# Patient Record
Sex: MALE | Race: WHITE | Employment: FULL TIME | URBAN - METROPOLITAN AREA
[De-identification: names, ages, dates, MRNs, and addresses within clinical notes are randomized per-mention and may not be internally consistent; named-entity substitution may affect disease eponyms.]

---

## 2018-06-22 PROBLEM — C80.1 CANCER (HCC): Status: ACTIVE | Noted: 2018-06-01

## 2018-06-22 PROBLEM — Z51.11 ENCOUNTER FOR ANTINEOPLASTIC CHEMOTHERAPY: Status: ACTIVE | Noted: 2018-06-22

## 2018-06-22 PROBLEM — C84.49 PERIPHERAL T CELL LYMPHOMA OF EXTRANODAL AND SOLID ORGAN SITES (HCC): Status: ACTIVE | Noted: 2018-06-22

## 2018-07-02 PROBLEM — R50.81 NEUTROPENIC FEVER (HCC): Status: ACTIVE | Noted: 2018-07-02

## 2018-07-02 PROBLEM — D70.9 NEUTROPENIC FEVER (HCC): Status: ACTIVE | Noted: 2018-07-02

## 2018-07-22 PROBLEM — Z51.11 ENCOUNTER FOR ANTINEOPLASTIC CHEMOTHERAPY: Status: RESOLVED | Noted: 2018-06-22 | Resolved: 2018-07-22

## 2018-07-24 ENCOUNTER — HOSPITAL ENCOUNTER (INPATIENT)
Age: 58
LOS: 4 days | Discharge: HOME | DRG: 690 | End: 2018-07-28
Attending: INTERNAL MEDICINE | Admitting: INTERNAL MEDICINE
Payer: COMMERCIAL

## 2018-07-24 ENCOUNTER — APPOINTMENT (OUTPATIENT)
Dept: GENERAL RADIOLOGY | Age: 58
DRG: 690 | End: 2018-07-24
Attending: INTERNAL MEDICINE
Payer: COMMERCIAL

## 2018-07-24 LAB
ABO/RH: NORMAL
ABO/RH: NORMAL
ALBUMIN SERPL-MCNC: 3.2 G/DL (ref 3.4–5)
ALP BLD-CCNC: 40 U/L (ref 40–129)
ALT SERPL-CCNC: 24 U/L (ref 10–40)
ANION GAP SERPL CALCULATED.3IONS-SCNC: 13 MMOL/L (ref 3–16)
ANTIBODY SCREEN: NORMAL
APTT: 24.1 SEC (ref 26–36)
AST SERPL-CCNC: 24 U/L (ref 15–37)
BACTERIA: ABNORMAL /HPF
BILIRUB SERPL-MCNC: 0.4 MG/DL (ref 0–1)
BILIRUBIN DIRECT: <0.2 MG/DL (ref 0–0.3)
BILIRUBIN URINE: NEGATIVE
BILIRUBIN, INDIRECT: ABNORMAL MG/DL (ref 0–1)
BLOOD, URINE: ABNORMAL
BUN BLDV-MCNC: 15 MG/DL (ref 7–20)
CALCIUM SERPL-MCNC: 8.6 MG/DL (ref 8.3–10.6)
CHLORIDE BLD-SCNC: 101 MMOL/L (ref 99–110)
CLARITY: CLEAR
CO2: 25 MMOL/L (ref 21–32)
COLOR: YELLOW
CREAT SERPL-MCNC: 0.6 MG/DL (ref 0.9–1.3)
GFR AFRICAN AMERICAN: >60
GFR NON-AFRICAN AMERICAN: >60
GLUCOSE BLD-MCNC: 126 MG/DL (ref 70–99)
GLUCOSE URINE: NEGATIVE MG/DL
HCT VFR BLD CALC: 12.8 % (ref 40.5–52.5)
HEMOGLOBIN: 4.6 G/DL (ref 13.5–17.5)
INR BLD: 1.14 (ref 0.86–1.14)
KETONES, URINE: ABNORMAL MG/DL
LACTATE DEHYDROGENASE: 127 U/L (ref 100–190)
LEUKOCYTE ESTERASE, URINE: ABNORMAL
MCH RBC QN AUTO: 27 PG (ref 26–34)
MCHC RBC AUTO-ENTMCNC: 35.7 G/DL (ref 31–36)
MCV RBC AUTO: 75.5 FL (ref 80–100)
MICROSCOPIC EXAMINATION: YES
NITRITE, URINE: POSITIVE
PDW BLD-RTO: 19.9 % (ref 12.4–15.4)
PH UA: 7
PHOSPHORUS: 2.6 MG/DL (ref 2.5–4.9)
PLATELET # BLD: 41 K/UL (ref 135–450)
PMV BLD AUTO: 7.3 FL (ref 5–10.5)
POTASSIUM SERPL-SCNC: 3.7 MMOL/L (ref 3.5–5.1)
PROTEIN UA: 100 MG/DL
PROTHROMBIN TIME: 13 SEC (ref 9.8–13)
RBC # BLD: 1.69 M/UL (ref 4.2–5.9)
RBC UA: ABNORMAL /HPF (ref 0–2)
SODIUM BLD-SCNC: 139 MMOL/L (ref 136–145)
SPECIFIC GRAVITY UA: 1.01
TOTAL PROTEIN: 5.7 G/DL (ref 6.4–8.2)
URIC ACID, SERUM: 2.5 MG/DL (ref 3.5–7.2)
URINE TYPE: ABNORMAL
UROBILINOGEN, URINE: 4 E.U./DL
WBC # BLD: 0.3 K/UL (ref 4–11)
WBC UA: >100 /HPF (ref 0–5)

## 2018-07-24 PROCEDURE — P9040 RBC LEUKOREDUCED IRRADIATED: HCPCS

## 2018-07-24 PROCEDURE — 86901 BLOOD TYPING SEROLOGIC RH(D): CPT

## 2018-07-24 PROCEDURE — 71046 X-RAY EXAM CHEST 2 VIEWS: CPT

## 2018-07-24 PROCEDURE — 87040 BLOOD CULTURE FOR BACTERIA: CPT

## 2018-07-24 PROCEDURE — 85730 THROMBOPLASTIN TIME PARTIAL: CPT

## 2018-07-24 PROCEDURE — 83615 LACTATE (LD) (LDH) ENZYME: CPT

## 2018-07-24 PROCEDURE — 84550 ASSAY OF BLOOD/URIC ACID: CPT

## 2018-07-24 PROCEDURE — 6370000000 HC RX 637 (ALT 250 FOR IP): Performed by: INTERNAL MEDICINE

## 2018-07-24 PROCEDURE — 6360000002 HC RX W HCPCS: Performed by: INTERNAL MEDICINE

## 2018-07-24 PROCEDURE — 87186 SC STD MICRODIL/AGAR DIL: CPT

## 2018-07-24 PROCEDURE — 85025 COMPLETE CBC W/AUTO DIFF WBC: CPT

## 2018-07-24 PROCEDURE — 2580000003 HC RX 258: Performed by: INTERNAL MEDICINE

## 2018-07-24 PROCEDURE — 36430 TRANSFUSION BLD/BLD COMPNT: CPT

## 2018-07-24 PROCEDURE — 86923 COMPATIBILITY TEST ELECTRIC: CPT

## 2018-07-24 PROCEDURE — 84100 ASSAY OF PHOSPHORUS: CPT

## 2018-07-24 PROCEDURE — 87077 CULTURE AEROBIC IDENTIFY: CPT

## 2018-07-24 PROCEDURE — 80048 BASIC METABOLIC PNL TOTAL CA: CPT

## 2018-07-24 PROCEDURE — 36591 DRAW BLOOD OFF VENOUS DEVICE: CPT

## 2018-07-24 PROCEDURE — 80076 HEPATIC FUNCTION PANEL: CPT

## 2018-07-24 PROCEDURE — 87081 CULTURE SCREEN ONLY: CPT

## 2018-07-24 PROCEDURE — 86900 BLOOD TYPING SEROLOGIC ABO: CPT

## 2018-07-24 PROCEDURE — 94760 N-INVAS EAR/PLS OXIMETRY 1: CPT

## 2018-07-24 PROCEDURE — 81001 URINALYSIS AUTO W/SCOPE: CPT

## 2018-07-24 PROCEDURE — 87086 URINE CULTURE/COLONY COUNT: CPT

## 2018-07-24 PROCEDURE — 94664 DEMO&/EVAL PT USE INHALER: CPT

## 2018-07-24 PROCEDURE — 2060000000 HC ICU INTERMEDIATE R&B

## 2018-07-24 PROCEDURE — 86850 RBC ANTIBODY SCREEN: CPT

## 2018-07-24 PROCEDURE — 85610 PROTHROMBIN TIME: CPT

## 2018-07-24 PROCEDURE — 94640 AIRWAY INHALATION TREATMENT: CPT

## 2018-07-24 RX ORDER — ONDANSETRON 2 MG/ML
8 INJECTION INTRAMUSCULAR; INTRAVENOUS EVERY 8 HOURS PRN
Status: DISCONTINUED | OUTPATIENT
Start: 2018-07-24 | End: 2018-07-28 | Stop reason: HOSPADM

## 2018-07-24 RX ORDER — 0.9 % SODIUM CHLORIDE 0.9 %
1000 INTRAVENOUS SOLUTION INTRAVENOUS ONCE
Status: COMPLETED | OUTPATIENT
Start: 2018-07-24 | End: 2018-07-25

## 2018-07-24 RX ORDER — OXYCODONE HYDROCHLORIDE AND ACETAMINOPHEN 5; 325 MG/1; MG/1
2 TABLET ORAL EVERY 4 HOURS PRN
Status: DISCONTINUED | OUTPATIENT
Start: 2018-07-24 | End: 2018-07-28 | Stop reason: HOSPADM

## 2018-07-24 RX ORDER — MAGNESIUM SULFATE IN WATER 40 MG/ML
4 INJECTION, SOLUTION INTRAVENOUS PRN
Status: DISCONTINUED | OUTPATIENT
Start: 2018-07-24 | End: 2018-07-28 | Stop reason: HOSPADM

## 2018-07-24 RX ORDER — VALACYCLOVIR HYDROCHLORIDE 500 MG/1
500 TABLET, FILM COATED ORAL 2 TIMES DAILY
Status: DISCONTINUED | OUTPATIENT
Start: 2018-07-24 | End: 2018-07-27

## 2018-07-24 RX ORDER — SODIUM CHLORIDE 9 MG/ML
500 INJECTION, SOLUTION INTRAVENOUS CONTINUOUS
Status: DISCONTINUED | OUTPATIENT
Start: 2018-07-24 | End: 2018-07-25

## 2018-07-24 RX ORDER — FLUCONAZOLE 200 MG/1
400 TABLET ORAL DAILY
Status: DISCONTINUED | OUTPATIENT
Start: 2018-07-24 | End: 2018-07-26

## 2018-07-24 RX ORDER — SODIUM CHLORIDE 0.9 % (FLUSH) 0.9 %
10 SYRINGE (ML) INJECTION PRN
Status: DISCONTINUED | OUTPATIENT
Start: 2018-07-24 | End: 2018-07-28 | Stop reason: HOSPADM

## 2018-07-24 RX ORDER — SODIUM CHLORIDE 0.9 % (FLUSH) 0.9 %
10 SYRINGE (ML) INJECTION EVERY 12 HOURS SCHEDULED
Status: DISCONTINUED | OUTPATIENT
Start: 2018-07-24 | End: 2018-07-28 | Stop reason: HOSPADM

## 2018-07-24 RX ORDER — OXYCODONE HYDROCHLORIDE AND ACETAMINOPHEN 5; 325 MG/1; MG/1
1 TABLET ORAL EVERY 4 HOURS PRN
Status: DISCONTINUED | OUTPATIENT
Start: 2018-07-24 | End: 2018-07-28 | Stop reason: HOSPADM

## 2018-07-24 RX ORDER — ACETAMINOPHEN 325 MG/1
650 TABLET ORAL EVERY 4 HOURS PRN
Status: DISCONTINUED | OUTPATIENT
Start: 2018-07-24 | End: 2018-07-25 | Stop reason: SDUPTHER

## 2018-07-24 RX ORDER — POTASSIUM CHLORIDE 29.8 MG/ML
80 INJECTION INTRAVENOUS PRN
Status: DISCONTINUED | OUTPATIENT
Start: 2018-07-24 | End: 2018-07-28 | Stop reason: HOSPADM

## 2018-07-24 RX ORDER — 0.9 % SODIUM CHLORIDE 0.9 %
250 INTRAVENOUS SOLUTION INTRAVENOUS ONCE
Status: COMPLETED | OUTPATIENT
Start: 2018-07-24 | End: 2018-07-25

## 2018-07-24 RX ADMIN — SODIUM CHLORIDE 1000 ML: 9 INJECTION, SOLUTION INTRAVENOUS at 21:52

## 2018-07-24 RX ADMIN — FLUCONAZOLE 400 MG: 200 TABLET ORAL at 21:31

## 2018-07-24 RX ADMIN — SODIUM CHLORIDE 500 ML: 9 INJECTION, SOLUTION INTRAVENOUS at 21:53

## 2018-07-24 RX ADMIN — VALACYCLOVIR HYDROCHLORIDE 500 MG: 500 TABLET, FILM COATED ORAL at 21:31

## 2018-07-24 RX ADMIN — PIPERACILLIN SODIUM,TAZOBACTAM SODIUM 4.5 G: 4; .5 INJECTION, POWDER, FOR SOLUTION INTRAVENOUS at 21:53

## 2018-07-24 RX ADMIN — SODIUM CHLORIDE 250 ML: 9 INJECTION, SOLUTION INTRAVENOUS at 23:15

## 2018-07-24 ASSESSMENT — PAIN SCALES - GENERAL
PAINLEVEL_OUTOF10: 0
PAINLEVEL_OUTOF10: 0

## 2018-07-24 NOTE — H&P
[facility] History and Physical       Attending Physician: Mallorie Mayorga DO        Referring MD: Mallorie Terry.DO  Stirling Franck  [address]  [place], [address]    Name: Sarah Mcneill :  1960  MRN:  [id number]    Admission: 2018      Date: 2018    Reason for Admission: Cycle #2 CHOEP for PTCL, Stage IVb    History of Present Illness: This is a 61year old male, [place]ian, hematology professor at [employer] w/ a PMH of depression / anxiety, GERD and hypertension. He initially presented to urgent care in 2018 with fever, chills, nausea and diminished appetite. He was given a prescription for Azithromycin, but did not have improvement in his symptoms, so went back to urgent care and was given a prescription for Cipro. He was also informed to follow up with his PCP. At that time he was found to have a transaminitis w/ ,  and normal Alk Phos. He was then admitted (18) for additional work up. Additional work - up included CT scans (5/3/18) that showed extensive enlarged mesenteric lymph nodes and mildly prominent periaortic lymph nodes, scattered pleural parenchymal thickening and splenic hematoma. He then underwent PET / CT scan (18) that showed multiple areas of adenopathy with mild uptake in the neck, axilla and abdomen. He had a large splenic hematoma, mild diffuse bone marrow activity and nonspecific multiple small lung nodules. He then underwent right axillary lymph node biopsy (18) that revealed a diagnosis of peripheral T-cell lymphoma, NOS. He underwent BM bx/asp (18) that showed involvement with PTCL. Cytogenetics on the BM bx were reported as \" no growth\" and FISH panel was unremarkable.       Day #9, CHOEP - seen in [place], looks terrible; febrile at 100.8  Lost 10 lbs, not eating very well  Hgb 5.4 g/dL on outside labs        Past Surgical History:   Procedure Laterality Date    throat. · Cardiovascular: No chest pain, dyspnea on exertion, palpitations or loss of consciousness. + BLE swelling  · Respiratory: + non productive cough, or wheezing, no sputum production. No hemoptysis. · Gastrointestinal: No abdominal pain, +appetite loss, No blood in stools. · Genitourinary: No dysuria, trouble voiding, or hematuria. · Musculoskeletal:  + generalized weakness. No joint complaints. · Integumentary: No rash or pruritis. · Neurological: No headache, diplopia. No change in gait, balance, or coordination. No paresthesias. · Endocrine: No temperature intolerance. No excessive thirst, fluid intake, or urination. · Hematologic/Lymphatic: No abnormal bruising or ecchymoses, blood clots or swollen lymph nodes. · Allergic/Immunologic: No nasal congestion or hives. Physical Exam:     Vital Signs:  /71   Pulse 103   Temp 98.5 °F (36.9 °C) (Oral)   Resp 18   SpO2 100%     Weight:    Wt Readings from Last 3 Encounters:   07/06/18 200 lb (90.7 kg)   06/25/18 214 lb 8.1 oz (97.3 kg)         General: Awake, alert and oriented. HEENT: normocephalic, PERRL, no scleral erythema or icterus, Oral mucosa moist and intact, throat clear  NECK: supple without palpable adenopathy  BACK: Straight negative CVAT  SKIN: warm dry and intact without lesions rashes or masses  CHEST: rhonchi bilaterally in the bases, otherwise clear without use of accessory muscles  CV: Normal S1 S2, RRR, no MRG  ABD: NT ND normoactive BS, + palpable splenomegaly 3 finger breaths below costal margin  EXTREMITIES: 1+ bilateral pedal edema, denies calf tenderness  NEURO: CN II - XII grossly intact  CATHETER: Right PAC (IR , 6/21/18) - No erythema or tenderness    Laboratory Data: pending  CBC: No results for input(s): WBC, HGB, HCT, MCV, PLT in the last 72 hours. BMP/Mag:No results for input(s): NA, K, CL, CO2, PHOS, BUN, CREATININE, MG in the last 72 hours.     Invalid input(s): CA  LIVP: No results for input(s): AST, ALT, LIPASE, BILIDIR, BILITOT, ALKPHOS in the last 72 hours. Invalid input(s): AMYLASE,  ALB  Coags: No results for input(s): PROTIME, INR, APTT in the last 72 hours. Uric Acid No results for input(s): LABURIC in the last 72 hours. Diagnostic Imagin. PET / CT scan (18): Pathology:  1. Right Axilla Lymph Node Biopsy (18):    2.  BM bx/asp (18): IHC:      Cytogenetic:  No growth in culture   FISH:  WNL    PROBLEM LIST:             1. PTCL, Stabe IVb w/ BM involvement (Dx 18)  2. Depression / Anxiety  3. GERD  4. HTN      TREATMENT:            1. CHOEP   Cycle #1 - 18      ASSESSMENT AND PLAN:           1. PTCL, Stage IVb w/ BM involvement:    - Plan:  CHOEP x 4 cycles, Restage and if in CR, then BEAM & ASCT  - Admit for cycle #1 CHOEP    Cycle #2, Day +9: Will need dose reduction next cycle    2. ID: Patient is currently afebrile with cough, but no SOB  - CXR, zosyn and prophylaxis  - panculture    3. Heme: Anemia and thrombocytopenia from disease  - Transfuse for Hgb < 7 and Platelets < 10 K. - Two units of PRBC's transfusion today     4. Metabolic:  Electrolytes are WNL and renal fxn stable. - Start IVF hydration   - Replace Potassium and Magnesium per protocol. 5.  Tumor Lysis Syndrome:  No evidence   - Begin Allopurinol 300 mg daily and IVF hydration    6. Nutrition:  Weight loss and decreased appetite from malignancy   - Start low microbial diet  - Dietary to follow closely     7. Depression / Anxiety  - Off all medications   - Psych to follow as needed    8. GERD:  Stable  - Start Pepcid with h/o GERD and starting steroids       - DVT Prophylaxis: Platelets <90,815 cells/dL - prophylactic lovenox on hold and mechanical prophylaxis with bilateral SCDs while in bed in place. Contraindications to pharmacologic prophylaxis: Thrombocytopenia  Contraindications to mechanical prophylaxis: None    - Disposition: Unknown      Wagner Goodrich , DO, MS

## 2018-07-25 LAB
ALBUMIN SERPL-MCNC: 3.2 G/DL (ref 3.4–5)
ALP BLD-CCNC: 45 U/L (ref 40–129)
ALT SERPL-CCNC: 30 U/L (ref 10–40)
ANION GAP SERPL CALCULATED.3IONS-SCNC: 10 MMOL/L (ref 3–16)
AST SERPL-CCNC: 29 U/L (ref 15–37)
BILIRUB SERPL-MCNC: 0.8 MG/DL (ref 0–1)
BILIRUBIN DIRECT: <0.2 MG/DL (ref 0–0.3)
BILIRUBIN, INDIRECT: ABNORMAL MG/DL (ref 0–1)
BLOOD BANK DISPENSE STATUS: NORMAL
BLOOD BANK PRODUCT CODE: NORMAL
BPU ID: NORMAL
BUN BLDV-MCNC: 11 MG/DL (ref 7–20)
CALCIUM SERPL-MCNC: 8.7 MG/DL (ref 8.3–10.6)
CHLORIDE BLD-SCNC: 106 MMOL/L (ref 99–110)
CO2: 23 MMOL/L (ref 21–32)
CREAT SERPL-MCNC: <0.5 MG/DL (ref 0.9–1.3)
DESCRIPTION BLOOD BANK: NORMAL
GFR AFRICAN AMERICAN: >60
GFR NON-AFRICAN AMERICAN: >60
GLUCOSE BLD-MCNC: 93 MG/DL (ref 70–99)
HCT VFR BLD CALC: 17.6 % (ref 40.5–52.5)
HEMOGLOBIN: 6.2 G/DL (ref 13.5–17.5)
LACTATE DEHYDROGENASE: 131 U/L (ref 100–190)
MCH RBC QN AUTO: 27.9 PG (ref 26–34)
MCHC RBC AUTO-ENTMCNC: 35.1 G/DL (ref 31–36)
MCV RBC AUTO: 79.6 FL (ref 80–100)
PDW BLD-RTO: 19.7 % (ref 12.4–15.4)
PHOSPHORUS: 3.3 MG/DL (ref 2.5–4.9)
PLATELET # BLD: 33 K/UL (ref 135–450)
PMV BLD AUTO: 7.2 FL (ref 5–10.5)
POTASSIUM SERPL-SCNC: 4 MMOL/L (ref 3.5–5.1)
RBC # BLD: 2.21 M/UL (ref 4.2–5.9)
SODIUM BLD-SCNC: 139 MMOL/L (ref 136–145)
TOTAL PROTEIN: 5.6 G/DL (ref 6.4–8.2)
URIC ACID, SERUM: 1.7 MG/DL (ref 3.5–7.2)
WBC # BLD: 0.4 K/UL (ref 4–11)

## 2018-07-25 PROCEDURE — 6370000000 HC RX 637 (ALT 250 FOR IP): Performed by: NURSE PRACTITIONER

## 2018-07-25 PROCEDURE — 80076 HEPATIC FUNCTION PANEL: CPT

## 2018-07-25 PROCEDURE — 87103 BLOOD FUNGUS CULTURE: CPT

## 2018-07-25 PROCEDURE — 2580000003 HC RX 258: Performed by: NURSE PRACTITIONER

## 2018-07-25 PROCEDURE — 6370000000 HC RX 637 (ALT 250 FOR IP): Performed by: INTERNAL MEDICINE

## 2018-07-25 PROCEDURE — 87070 CULTURE OTHR SPECIMN AEROBIC: CPT

## 2018-07-25 PROCEDURE — 84100 ASSAY OF PHOSPHORUS: CPT

## 2018-07-25 PROCEDURE — 84550 ASSAY OF BLOOD/URIC ACID: CPT

## 2018-07-25 PROCEDURE — 87205 SMEAR GRAM STAIN: CPT

## 2018-07-25 PROCEDURE — 36430 TRANSFUSION BLD/BLD COMPNT: CPT

## 2018-07-25 PROCEDURE — P9040 RBC LEUKOREDUCED IRRADIATED: HCPCS

## 2018-07-25 PROCEDURE — 36591 DRAW BLOOD OFF VENOUS DEVICE: CPT

## 2018-07-25 PROCEDURE — 86923 COMPATIBILITY TEST ELECTRIC: CPT

## 2018-07-25 PROCEDURE — 87102 FUNGUS ISOLATION CULTURE: CPT

## 2018-07-25 PROCEDURE — 87252 VIRUS INOCULATION TISSUE: CPT

## 2018-07-25 PROCEDURE — 87253 VIRUS INOCULATE TISSUE ADDL: CPT

## 2018-07-25 PROCEDURE — 2060000000 HC ICU INTERMEDIATE R&B

## 2018-07-25 PROCEDURE — 83615 LACTATE (LD) (LDH) ENZYME: CPT

## 2018-07-25 PROCEDURE — 6360000002 HC RX W HCPCS: Performed by: INTERNAL MEDICINE

## 2018-07-25 PROCEDURE — 80048 BASIC METABOLIC PNL TOTAL CA: CPT

## 2018-07-25 PROCEDURE — 2580000003 HC RX 258: Performed by: INTERNAL MEDICINE

## 2018-07-25 PROCEDURE — 85025 COMPLETE CBC W/AUTO DIFF WBC: CPT

## 2018-07-25 RX ORDER — ACETAMINOPHEN 325 MG/1
650 TABLET ORAL ONCE
Status: COMPLETED | OUTPATIENT
Start: 2018-07-25 | End: 2018-07-25

## 2018-07-25 RX ORDER — SUCRALFATE ORAL 1 G/10ML
1 SUSPENSION ORAL 4 TIMES DAILY PRN
Status: DISCONTINUED | OUTPATIENT
Start: 2018-07-25 | End: 2018-07-27

## 2018-07-25 RX ORDER — PANTOPRAZOLE SODIUM 40 MG/1
40 TABLET, DELAYED RELEASE ORAL
Status: DISCONTINUED | OUTPATIENT
Start: 2018-07-25 | End: 2018-07-26

## 2018-07-25 RX ORDER — SODIUM CHLORIDE 9 MG/ML
INJECTION, SOLUTION INTRAVENOUS
Status: DISPENSED
Start: 2018-07-25 | End: 2018-07-25

## 2018-07-25 RX ORDER — DIPHENHYDRAMINE HCL 25 MG
25 TABLET ORAL ONCE
Status: COMPLETED | OUTPATIENT
Start: 2018-07-25 | End: 2018-07-25

## 2018-07-25 RX ORDER — 0.9 % SODIUM CHLORIDE 0.9 %
250 INTRAVENOUS SOLUTION INTRAVENOUS ONCE
Status: DISCONTINUED | OUTPATIENT
Start: 2018-07-25 | End: 2018-07-26

## 2018-07-25 RX ORDER — SODIUM CHLORIDE 9 MG/ML
500 INJECTION, SOLUTION INTRAVENOUS CONTINUOUS
Status: DISCONTINUED | OUTPATIENT
Start: 2018-07-25 | End: 2018-07-26

## 2018-07-25 RX ORDER — ACETAMINOPHEN 325 MG/1
650 TABLET ORAL PRN
Status: DISCONTINUED | OUTPATIENT
Start: 2018-07-25 | End: 2018-07-28 | Stop reason: HOSPADM

## 2018-07-25 RX ADMIN — Medication 10 ML: at 00:07

## 2018-07-25 RX ADMIN — ACETAMINOPHEN 650 MG: 325 TABLET, FILM COATED ORAL at 00:08

## 2018-07-25 RX ADMIN — PIPERACILLIN SODIUM,TAZOBACTAM SODIUM 4.5 G: 4; .5 INJECTION, POWDER, FOR SOLUTION INTRAVENOUS at 04:05

## 2018-07-25 RX ADMIN — VALACYCLOVIR HYDROCHLORIDE 500 MG: 500 TABLET, FILM COATED ORAL at 20:14

## 2018-07-25 RX ADMIN — PANTOPRAZOLE SODIUM 40 MG: 40 TABLET, DELAYED RELEASE ORAL at 17:33

## 2018-07-25 RX ADMIN — VALACYCLOVIR HYDROCHLORIDE 500 MG: 500 TABLET, FILM COATED ORAL at 12:07

## 2018-07-25 RX ADMIN — Medication 10 ML: at 20:15

## 2018-07-25 RX ADMIN — SODIUM CHLORIDE 500 ML: 9 INJECTION, SOLUTION INTRAVENOUS at 11:59

## 2018-07-25 RX ADMIN — PIPERACILLIN SODIUM,TAZOBACTAM SODIUM 4.5 G: 4; .5 INJECTION, POWDER, FOR SOLUTION INTRAVENOUS at 22:26

## 2018-07-25 RX ADMIN — FLUCONAZOLE 400 MG: 200 TABLET ORAL at 12:07

## 2018-07-25 RX ADMIN — PIPERACILLIN SODIUM,TAZOBACTAM SODIUM 4.5 G: 4; .5 INJECTION, POWDER, FOR SOLUTION INTRAVENOUS at 12:06

## 2018-07-25 RX ADMIN — SODIUM CHLORIDE 250 ML: 9 INJECTION, SOLUTION INTRAVENOUS at 05:15

## 2018-07-25 RX ADMIN — ACETAMINOPHEN 650 MG: 325 TABLET, FILM COATED ORAL at 11:53

## 2018-07-25 RX ADMIN — Medication 10 ML: at 09:08

## 2018-07-25 RX ADMIN — DIPHENHYDRAMINE HCL 25 MG: 25 TABLET ORAL at 00:18

## 2018-07-25 RX ADMIN — PIPERACILLIN SODIUM,TAZOBACTAM SODIUM 4.5 G: 4; .5 INJECTION, POWDER, FOR SOLUTION INTRAVENOUS at 17:33

## 2018-07-25 RX ADMIN — OXYCODONE HYDROCHLORIDE AND ACETAMINOPHEN 1 TABLET: 5; 325 TABLET ORAL at 19:43

## 2018-07-25 ASSESSMENT — PAIN SCALES - GENERAL
PAINLEVEL_OUTOF10: 0
PAINLEVEL_OUTOF10: 6
PAINLEVEL_OUTOF10: 0
PAINLEVEL_OUTOF10: 0
PAINLEVEL_OUTOF10: 6
PAINLEVEL_OUTOF10: 2
PAINLEVEL_OUTOF10: 0
PAINLEVEL_OUTOF10: 0
PAINLEVEL_OUTOF10: 6
PAINLEVEL_OUTOF10: 0

## 2018-07-25 ASSESSMENT — PAIN DESCRIPTION - LOCATION
LOCATION: ABDOMEN

## 2018-07-25 ASSESSMENT — PAIN DESCRIPTION - ONSET: ONSET: SUDDEN

## 2018-07-25 ASSESSMENT — PAIN DESCRIPTION - PAIN TYPE: TYPE: ACUTE PAIN

## 2018-07-25 ASSESSMENT — PAIN DESCRIPTION - FREQUENCY: FREQUENCY: INTERMITTENT

## 2018-07-25 ASSESSMENT — PAIN DESCRIPTION - PROGRESSION: CLINICAL_PROGRESSION: GRADUALLY WORSENING

## 2018-07-25 ASSESSMENT — PAIN DESCRIPTION - ORIENTATION: ORIENTATION: MID;UPPER

## 2018-07-25 ASSESSMENT — ACTIVITIES OF DAILY LIVING (ADL): EFFECT OF PAIN ON DAILY ACTIVITIES: COMFORT

## 2018-07-25 ASSESSMENT — PAIN DESCRIPTION - DESCRIPTORS: DESCRIPTORS: ACHING;BURNING

## 2018-07-25 NOTE — CONSULTS
Nutrition Assessment    Type and Reason for Visit: Initial, Consult    Nutrition Recommendations:   · Continue Low Microbial diet, monitor and encourage intake  · Start Magic cup, BID   · Monitor weights, labs, and clinical progress     Malnutrition Assessment:  · Malnutrition Status: Insufficient data  · Context: Chronic illness  · Findings of the 6 clinical characteristics of malnutrition (Minimum of 2 out of 6 clinical characteristics is required to make the diagnosis of moderate or severe Protein Calorie Malnutrition based on AND/ASPEN Guidelines):  1. Energy Intake-Not available,      2. Weight Loss-10% loss or greater, in 6 months  3. Fat Loss-Unable to assess,    4. Muscle Loss-Unable to assess,    5. Fluid Accumulation-No significant fluid accumulation,    6.  Strength-Not measured    Nutrition Diagnosis:   · Problem: Inadequate oral intake  · Etiology: related to Catabolic illness     Signs and symptoms:  as evidenced by Weight loss greater than or equal to 10% in 6 months    Nutrition Assessment:  · Subjective Assessment: RD consult for Unplanned weight loss. pt presents w/ neutropenic fever and PMHx of GERD, Anxiety, HTN, Peripheral T-Cell Lymphoma. He reports a UBW of 230lb, but his appetite has not been decreased. He attributes weight loss to chemotherapy treatments. CBW of 187lb 4.8oz. Noted pt w/ weight of 214lb 8.1oz on 6/22/18 and wt of 200lb on 7/2/18; a wt loss of 12.6% x 2 months and 6% x 1 month respectively. He feels a little constipated at  this time, provided recommendations. Reviewed Food safety guidelines with patient. Pt willing to trial magic cup, will add.    · Nutrition-Focused Physical Findings: no edema; +BM 7/24  · Wound Type: None  · Current Nutrition Therapies:  · Oral Diet Orders: Low Microbial   · Oral Diet intake: Unable to assess  · Oral Nutrition Supplement (ONS) Orders: None  · Anthropometric Measures:  · Ht: 5' 10\" (177.8 cm)   · Current Body Wt: 187 lb 6.3 oz (85

## 2018-07-25 NOTE — FLOWSHEET NOTE
07/25/18 1624   Encounter Summary   Services provided to: Patient   Volunteer Visit (Kathryn Muniz 7/25)   Sacraments   Communion Patient received communion

## 2018-07-25 NOTE — CARE COORDINATION
Initial Social Work Note    Patient was admitted to the unit yesterday for support. He was diagnosed with T cell lymphoma in May of 2018. Patient is known to this writer from other admissions to the unit. SW stayed after rounds to talk with patient. He said his job today \"is to sleep\". Patient stated he did not sleep well last night. No questions or concerns for SW at this time. He lives with wife. Patient does not have any services in the home. Plan of care:  SW will monitor for discharge and psychosocial needs     Estimated discharge date: unsure at this time  Disposition: [address] [facility] [address] home w/ wife  Advanced Directives on chart? No  Patient/family aware of discharge plans?  Yes     KRISTEN Winchester, [address]

## 2018-07-25 NOTE — PROGRESS NOTES
4 Eyes Admission Assessment     I agree as the admission nurse that 2 RN's have performed a thorough Head to Toe Skin Assessment on the patient. ALL assessment sites listed below have been assessed on admission. Areas assessed by both nurses: Elbridge Nearing and [place] Dec  [x]   Head, Face, and Ears   [x]   Shoulders, Back, and Chest  [x]   Arms, Elbows, and Hands   [x]   Coccyx, Sacrum, and Ischum  [x]   Legs, Feet, and Heels        Does the Patient have Skin Breakdown?   No         Gerald Prevention initiated:  No   Wound Care Orders initiated:  No      [facility] nurse consulted for Pressure Injury (Stage 3,4, Unstageable, DTI, NWPT, and Complex wounds):  No      Nurse 1 eSignature: Electronically signed by Desi Christie RN on 7/25/18 at 5:36 AM    **SHARE this note so that the co-signing nurse is able to place an eSignature**    Nurse 2 eSignature: Electronically signed by Marquita Arias RN on 7/27/18 at 6:52 AM

## 2018-07-25 NOTE — PROGRESS NOTES
1.  CHOEP   Cycle #1 - 6/22/18   Cycle #2- 7/18/18     ASSESSMENT AND PLAN:           1. PTCL, Stage IVb w/ BM involvement:    - Plan:  CHOEP x 4 cycles, Restage and if in CR, then BEAM & ASCT     Cycle #2, Day +10: Will need dose reduction next cycle     2. ID: Neutropenic fever POA, urinalysis with large amt WBC, positive nitrite and leukocyte esterase, Tmax 101.4  - CXR negative  - Pan-culture pending 7/24/18  -Continue Zosyn D2     3. Heme: Pancytopenia related to recent chemotherapy, Hgb 4.6 upon arrival  - Transfuse for Hgb < 7 and Platelets < 10 K.   - One units of PRBC's transfusion today  -FOB test today  -Possible febrile reaction to PRBC, tylenol 650 mg PO prior to transfusions     4. Metabolic:  Electrolytes are WNL and renal fxn stable. - Continue NS @ 50 cc/hr  -S/p 1 L NS bolus 7/24/18   - Replace Potassium and Magnesium per protocol.        5.  Nutrition:  Weight loss related to recent chemotherapy  - Start low microbial diet  - Dietary to follow closely      6. Depression / Anxiety  - Off all medications   - Psych to follow as needed     7. GERD:  Pain that improves with PO intake  -Protonix 40 mg PO BID  -Carafate 1 mg PO QID prn         - DVT Prophylaxis: Platelets <00,507 cells/dL - prophylactic lovenox on hold and mechanical prophylaxis with bilateral SCDs while in bed in place. Contraindications to pharmacologic prophylaxis: Thrombocytopenia  Contraindications to mechanical prophylaxis: None    - Disposition: Once afebrile and off IV abx      KYLIE Ramos - DAKOTA Godwin.  Afsaneh Schmidt DO, MS

## 2018-07-26 LAB
ANION GAP SERPL CALCULATED.3IONS-SCNC: 12 MMOL/L (ref 3–16)
APTT: 32.4 SEC (ref 26–36)
BUN BLDV-MCNC: 13 MG/DL (ref 7–20)
CALCIUM SERPL-MCNC: 8.9 MG/DL (ref 8.3–10.6)
CHLORIDE BLD-SCNC: 106 MMOL/L (ref 99–110)
CO2: 23 MMOL/L (ref 21–32)
CREAT SERPL-MCNC: <0.5 MG/DL (ref 0.9–1.3)
GFR AFRICAN AMERICAN: >60
GFR NON-AFRICAN AMERICAN: >60
GLUCOSE BLD-MCNC: 84 MG/DL (ref 70–99)
HCT VFR BLD CALC: 20.1 % (ref 40.5–52.5)
HEMOGLOBIN: 7.2 G/DL (ref 13.5–17.5)
INR BLD: 1.21 (ref 0.86–1.14)
MCH RBC QN AUTO: 28.3 PG (ref 26–34)
MCHC RBC AUTO-ENTMCNC: 35.8 G/DL (ref 31–36)
MCV RBC AUTO: 79 FL (ref 80–100)
PDW BLD-RTO: 18.9 % (ref 12.4–15.4)
PLATELET # BLD: 28 K/UL (ref 135–450)
PMV BLD AUTO: 7.8 FL (ref 5–10.5)
POTASSIUM SERPL-SCNC: 3.8 MMOL/L (ref 3.5–5.1)
PROTHROMBIN TIME: 13.8 SEC (ref 9.8–13)
RBC # BLD: 2.55 M/UL (ref 4.2–5.9)
SODIUM BLD-SCNC: 141 MMOL/L (ref 136–145)
VRE CULTURE: NORMAL
WBC # BLD: 0.4 K/UL (ref 4–11)

## 2018-07-26 PROCEDURE — 2580000003 HC RX 258: Performed by: NURSE PRACTITIONER

## 2018-07-26 PROCEDURE — 2580000003 HC RX 258: Performed by: INTERNAL MEDICINE

## 2018-07-26 PROCEDURE — 6370000000 HC RX 637 (ALT 250 FOR IP): Performed by: INTERNAL MEDICINE

## 2018-07-26 PROCEDURE — 85730 THROMBOPLASTIN TIME PARTIAL: CPT

## 2018-07-26 PROCEDURE — 80048 BASIC METABOLIC PNL TOTAL CA: CPT

## 2018-07-26 PROCEDURE — 85610 PROTHROMBIN TIME: CPT

## 2018-07-26 PROCEDURE — 6370000000 HC RX 637 (ALT 250 FOR IP): Performed by: NURSE PRACTITIONER

## 2018-07-26 PROCEDURE — 85025 COMPLETE CBC W/AUTO DIFF WBC: CPT

## 2018-07-26 PROCEDURE — 36591 DRAW BLOOD OFF VENOUS DEVICE: CPT

## 2018-07-26 PROCEDURE — 6360000002 HC RX W HCPCS: Performed by: INTERNAL MEDICINE

## 2018-07-26 PROCEDURE — 2060000000 HC ICU INTERMEDIATE R&B

## 2018-07-26 RX ORDER — FLUCONAZOLE 200 MG/1
200 TABLET ORAL DAILY
Status: DISCONTINUED | OUTPATIENT
Start: 2018-07-26 | End: 2018-07-28 | Stop reason: HOSPADM

## 2018-07-26 RX ORDER — SODIUM CHLORIDE 9 MG/ML
INJECTION, SOLUTION INTRAVENOUS CONTINUOUS
Status: DISCONTINUED | OUTPATIENT
Start: 2018-07-26 | End: 2018-07-27

## 2018-07-26 RX ORDER — FAMOTIDINE 20 MG/1
20 TABLET, FILM COATED ORAL 2 TIMES DAILY
Status: DISCONTINUED | OUTPATIENT
Start: 2018-07-26 | End: 2018-07-28 | Stop reason: HOSPADM

## 2018-07-26 RX ORDER — FLUCONAZOLE 200 MG/1
200 TABLET ORAL DAILY
Status: DISCONTINUED | OUTPATIENT
Start: 2018-07-27 | End: 2018-07-26

## 2018-07-26 RX ORDER — PHENAZOPYRIDINE HYDROCHLORIDE 200 MG/1
200 TABLET, FILM COATED ORAL
Status: COMPLETED | OUTPATIENT
Start: 2018-07-26 | End: 2018-07-28

## 2018-07-26 RX ADMIN — VALACYCLOVIR HYDROCHLORIDE 500 MG: 500 TABLET, FILM COATED ORAL at 20:46

## 2018-07-26 RX ADMIN — SODIUM CHLORIDE: 9 INJECTION, SOLUTION INTRAVENOUS at 13:59

## 2018-07-26 RX ADMIN — FAMOTIDINE 20 MG: 20 TABLET ORAL at 20:46

## 2018-07-26 RX ADMIN — PIPERACILLIN SODIUM,TAZOBACTAM SODIUM 4.5 G: 4; .5 INJECTION, POWDER, FOR SOLUTION INTRAVENOUS at 10:02

## 2018-07-26 RX ADMIN — FLUCONAZOLE 200 MG: 200 TABLET ORAL at 09:13

## 2018-07-26 RX ADMIN — OXYCODONE HYDROCHLORIDE AND ACETAMINOPHEN 2 TABLET: 5; 325 TABLET ORAL at 23:02

## 2018-07-26 RX ADMIN — PIPERACILLIN SODIUM,TAZOBACTAM SODIUM 4.5 G: 4; .5 INJECTION, POWDER, FOR SOLUTION INTRAVENOUS at 16:19

## 2018-07-26 RX ADMIN — PHENAZOPYRIDINE HYDROCHLORIDE 200 MG: 200 TABLET ORAL at 16:26

## 2018-07-26 RX ADMIN — SODIUM CHLORIDE 500 ML: 9 INJECTION, SOLUTION INTRAVENOUS at 02:59

## 2018-07-26 RX ADMIN — PHENAZOPYRIDINE HYDROCHLORIDE 200 MG: 200 TABLET ORAL at 13:03

## 2018-07-26 RX ADMIN — PIPERACILLIN SODIUM,TAZOBACTAM SODIUM 4.5 G: 4; .5 INJECTION, POWDER, FOR SOLUTION INTRAVENOUS at 22:47

## 2018-07-26 RX ADMIN — Medication 10 ML: at 20:47

## 2018-07-26 RX ADMIN — VALACYCLOVIR HYDROCHLORIDE 500 MG: 500 TABLET, FILM COATED ORAL at 09:13

## 2018-07-26 RX ADMIN — FAMOTIDINE 20 MG: 20 TABLET ORAL at 13:02

## 2018-07-26 RX ADMIN — PIPERACILLIN SODIUM,TAZOBACTAM SODIUM 4.5 G: 4; .5 INJECTION, POWDER, FOR SOLUTION INTRAVENOUS at 04:40

## 2018-07-26 ASSESSMENT — PAIN SCALES - GENERAL
PAINLEVEL_OUTOF10: 2
PAINLEVEL_OUTOF10: 7
PAINLEVEL_OUTOF10: 0
PAINLEVEL_OUTOF10: 1
PAINLEVEL_OUTOF10: 0
PAINLEVEL_OUTOF10: 0

## 2018-07-26 ASSESSMENT — PAIN DESCRIPTION - FREQUENCY
FREQUENCY: INTERMITTENT

## 2018-07-26 ASSESSMENT — PAIN DESCRIPTION - ORIENTATION
ORIENTATION: INNER
ORIENTATION: INNER

## 2018-07-26 ASSESSMENT — PAIN DESCRIPTION - PROGRESSION
CLINICAL_PROGRESSION: NOT CHANGED

## 2018-07-26 ASSESSMENT — PAIN DESCRIPTION - LOCATION
LOCATION: PENIS
LOCATION: PENIS
LOCATION: ABDOMEN

## 2018-07-26 ASSESSMENT — PAIN DESCRIPTION - PAIN TYPE
TYPE: CHRONIC PAIN
TYPE: ACUTE PAIN
TYPE: ACUTE PAIN

## 2018-07-26 ASSESSMENT — PAIN DESCRIPTION - ONSET
ONSET: ON-GOING
ONSET: ON-GOING
ONSET: PROGRESSIVE

## 2018-07-26 ASSESSMENT — ACTIVITIES OF DAILY LIVING (ADL)
EFFECT OF PAIN ON DAILY ACTIVITIES: COMFORT
EFFECT OF PAIN ON DAILY ACTIVITIES: COMFORT

## 2018-07-26 ASSESSMENT — PAIN DESCRIPTION - DESCRIPTORS
DESCRIPTORS: BURNING
DESCRIPTORS: BURNING
DESCRIPTORS: ACHING

## 2018-07-26 NOTE — FLOWSHEET NOTE
07/26/18 1515   Encounter Summary   Services provided to: Patient   Volunteer Visit (7/26 sr marissa Mcneal)   Sacraments   Communion Patient received communion

## 2018-07-26 NOTE — PLAN OF CARE
Problem: Falls - Risk of:  Goal: Will remain free from falls  Will remain free from falls   Outcome: Ongoing  + Screening for Orthostasis and/or + High Fall Risk per CASTORENA/ABCDS: Explained fall risk precautions to pt and family and rationale behind their use to keep the patient safe. Pt bed is in low position, side rails up, call light and belongings are in reach. Fall wristband applied and present on pts wrist.  Bed alarm on. Pt encouraged to call for assistance. Will continue with hourly rounds for PO intake, pain needs, toileting and repositioning as needed. -     Problem: Pain:  Goal: Control of acute pain  Control of acute pain   Outcome: Ongoing  Pt c/o burning upon urination and Pyridium prescribed and given. Problem: PROTECTIVE PRECAUTIONS  Goal: Patient will remain free of nosocomial Infections  Pt remains free from nosocomial infections. Problem: Bleeding:  Goal: Will show no signs and symptoms of excessive bleeding  Will show no signs and symptoms of excessive bleeding   Outcome: Ongoing  Patient's hemoglobin this AM:   Recent Labs      07/26/18   0250   HGB  7.2*     Patient's platelet count this AM:   Recent Labs      07/26/18   0250   PLT  28*    Thrombocytopenia Precautions in place. Patient showing no signs or symptoms of active bleeding. Transfusion not indicated at this time. Patient verbalizes understanding of all instructions. Will continue to assess and implement POC. Call light within reach and hourly rounding in place. Problem: Urinary Elimination:  Goal: Signs and symptoms of infection will decrease  Signs and symptoms of infection will decrease   Outcome: Ongoing  Pt. C/o of burning upon urination. IV Zosyn continues and Pyridium ordered and given. Problem: Nutrition Deficit:  Goal: Ability to achieve adequate nutritional intake will improve  Ability to achieve adequate nutritional intake will improve   Outcome: Ongoing  Pt taking in fliuds and eating meals.      Problem:
Problem: Venous Thromboembolism:  Goal: Will show no signs or symptoms of venous thromboembolism  Will show no signs or symptoms of venous thromboembolism  Outcome: Ongoing  Adherent with DVT Prevention: Pt is at risk for DVT d/t decreased mobility and cancer treatment. Pt educated on importance of activity. Pt has orders for SCDs while in bed. Pt verbalizes understanding of need for prophylaxis while inpatient. Pt will be educated on the use of SCD's while in bed.
malodorous shant urine.

## 2018-07-27 LAB
ALBUMIN SERPL-MCNC: 3.2 G/DL (ref 3.4–5)
ALP BLD-CCNC: 48 U/L (ref 40–129)
ALT SERPL-CCNC: 24 U/L (ref 10–40)
ANION GAP SERPL CALCULATED.3IONS-SCNC: 11 MMOL/L (ref 3–16)
AST SERPL-CCNC: 16 U/L (ref 15–37)
BILIRUB SERPL-MCNC: 0.5 MG/DL (ref 0–1)
BILIRUBIN DIRECT: <0.2 MG/DL (ref 0–0.3)
BILIRUBIN, INDIRECT: ABNORMAL MG/DL (ref 0–1)
BUN BLDV-MCNC: 12 MG/DL (ref 7–20)
CALCIUM SERPL-MCNC: 8.8 MG/DL (ref 8.3–10.6)
CHLORIDE BLD-SCNC: 105 MMOL/L (ref 99–110)
CO2: 23 MMOL/L (ref 21–32)
CREAT SERPL-MCNC: <0.5 MG/DL (ref 0.9–1.3)
FINAL REPORT: NORMAL
GFR AFRICAN AMERICAN: >60
GFR NON-AFRICAN AMERICAN: >60
GLUCOSE BLD-MCNC: 109 MG/DL (ref 70–99)
HCT VFR BLD CALC: 21 % (ref 40.5–52.5)
HEMOGLOBIN: 7.4 G/DL (ref 13.5–17.5)
LACTATE DEHYDROGENASE: 136 U/L (ref 100–190)
MCH RBC QN AUTO: 28 PG (ref 26–34)
MCHC RBC AUTO-ENTMCNC: 35.2 G/DL (ref 31–36)
MCV RBC AUTO: 79.6 FL (ref 80–100)
ORGANISM: ABNORMAL
PDW BLD-RTO: 19.1 % (ref 12.4–15.4)
PHOSPHORUS: 4.4 MG/DL (ref 2.5–4.9)
PLATELET # BLD: 37 K/UL (ref 135–450)
PMV BLD AUTO: 8.2 FL (ref 5–10.5)
POTASSIUM SERPL-SCNC: 3.5 MMOL/L (ref 3.5–5.1)
PRELIMINARY: NORMAL
RBC # BLD: 2.64 M/UL (ref 4.2–5.9)
SODIUM BLD-SCNC: 139 MMOL/L (ref 136–145)
THROAT CULTURE: NORMAL
TOTAL PROTEIN: 5.9 G/DL (ref 6.4–8.2)
URIC ACID, SERUM: 1.5 MG/DL (ref 3.5–7.2)
URINE CULTURE, ROUTINE: ABNORMAL
URINE CULTURE, ROUTINE: ABNORMAL
WBC # BLD: 0.4 K/UL (ref 4–11)

## 2018-07-27 PROCEDURE — 6370000000 HC RX 637 (ALT 250 FOR IP): Performed by: NURSE PRACTITIONER

## 2018-07-27 PROCEDURE — 80048 BASIC METABOLIC PNL TOTAL CA: CPT

## 2018-07-27 PROCEDURE — 84550 ASSAY OF BLOOD/URIC ACID: CPT

## 2018-07-27 PROCEDURE — 6370000000 HC RX 637 (ALT 250 FOR IP): Performed by: INTERNAL MEDICINE

## 2018-07-27 PROCEDURE — 2580000003 HC RX 258: Performed by: INTERNAL MEDICINE

## 2018-07-27 PROCEDURE — 84100 ASSAY OF PHOSPHORUS: CPT

## 2018-07-27 PROCEDURE — 6360000002 HC RX W HCPCS: Performed by: INTERNAL MEDICINE

## 2018-07-27 PROCEDURE — 80076 HEPATIC FUNCTION PANEL: CPT

## 2018-07-27 PROCEDURE — 85025 COMPLETE CBC W/AUTO DIFF WBC: CPT

## 2018-07-27 PROCEDURE — 2060000000 HC ICU INTERMEDIATE R&B

## 2018-07-27 PROCEDURE — 83615 LACTATE (LD) (LDH) ENZYME: CPT

## 2018-07-27 RX ORDER — VALACYCLOVIR HYDROCHLORIDE 1 G/1
1000 TABLET, FILM COATED ORAL 2 TIMES DAILY
Status: DISCONTINUED | OUTPATIENT
Start: 2018-07-27 | End: 2018-07-27

## 2018-07-27 RX ORDER — OXYCODONE HYDROCHLORIDE AND ACETAMINOPHEN 5; 325 MG/1; MG/1
1 TABLET ORAL EVERY 4 HOURS PRN
Qty: 14 TABLET | Refills: 0 | Status: CANCELLED
Start: 2018-07-27 | End: 2018-08-03

## 2018-07-27 RX ORDER — FAMOTIDINE 20 MG/1
20 TABLET, FILM COATED ORAL 2 TIMES DAILY
Qty: 60 TABLET | Refills: 3 | Status: CANCELLED | COMMUNITY
Start: 2018-07-27

## 2018-07-27 RX ORDER — FLUCONAZOLE 200 MG/1
200 TABLET ORAL DAILY
Qty: 30 TABLET | Refills: 0 | Status: CANCELLED
Start: 2018-07-28

## 2018-07-27 RX ORDER — LEVOFLOXACIN 500 MG/1
500 TABLET, FILM COATED ORAL DAILY
Status: DISCONTINUED | OUTPATIENT
Start: 2018-07-27 | End: 2018-07-28

## 2018-07-27 RX ORDER — VALACYCLOVIR HYDROCHLORIDE 500 MG/1
500 TABLET, FILM COATED ORAL 2 TIMES DAILY
Qty: 60 TABLET | Refills: 0 | Status: CANCELLED
Start: 2018-07-27

## 2018-07-27 RX ORDER — VALACYCLOVIR HYDROCHLORIDE 1 G/1
1000 TABLET, FILM COATED ORAL 2 TIMES DAILY
Qty: 8 TABLET | Refills: 0 | Status: CANCELLED | OUTPATIENT
Start: 2018-07-27 | End: 2018-07-31

## 2018-07-27 RX ORDER — LEVOFLOXACIN 500 MG/1
500 TABLET, FILM COATED ORAL DAILY
Qty: 6 TABLET | Refills: 0 | OUTPATIENT
Start: 2018-07-28 | End: 2018-08-03

## 2018-07-27 RX ORDER — VALACYCLOVIR HYDROCHLORIDE 500 MG/1
500 TABLET, FILM COATED ORAL 2 TIMES DAILY
Status: DISCONTINUED | OUTPATIENT
Start: 2018-07-27 | End: 2018-07-28 | Stop reason: HOSPADM

## 2018-07-27 RX ADMIN — Medication 10 ML: at 20:24

## 2018-07-27 RX ADMIN — VALACYCLOVIR HYDROCHLORIDE 500 MG: 500 TABLET, FILM COATED ORAL at 09:19

## 2018-07-27 RX ADMIN — Medication 10 ML: at 10:03

## 2018-07-27 RX ADMIN — FLUCONAZOLE 200 MG: 200 TABLET ORAL at 09:19

## 2018-07-27 RX ADMIN — PHENAZOPYRIDINE HYDROCHLORIDE 200 MG: 200 TABLET ORAL at 09:19

## 2018-07-27 RX ADMIN — FAMOTIDINE 20 MG: 20 TABLET ORAL at 09:19

## 2018-07-27 RX ADMIN — FAMOTIDINE 20 MG: 20 TABLET ORAL at 20:24

## 2018-07-27 RX ADMIN — PHENAZOPYRIDINE HYDROCHLORIDE 200 MG: 200 TABLET ORAL at 14:01

## 2018-07-27 RX ADMIN — PIPERACILLIN SODIUM,TAZOBACTAM SODIUM 4.5 G: 4; .5 INJECTION, POWDER, FOR SOLUTION INTRAVENOUS at 10:02

## 2018-07-27 RX ADMIN — PHENAZOPYRIDINE HYDROCHLORIDE 200 MG: 200 TABLET ORAL at 18:32

## 2018-07-27 RX ADMIN — PIPERACILLIN SODIUM,TAZOBACTAM SODIUM 4.5 G: 4; .5 INJECTION, POWDER, FOR SOLUTION INTRAVENOUS at 03:59

## 2018-07-27 RX ADMIN — LEVOFLOXACIN 500 MG: 500 TABLET, FILM COATED ORAL at 14:00

## 2018-07-27 RX ADMIN — VALACYCLOVIR HYDROCHLORIDE 500 MG: 500 TABLET, FILM COATED ORAL at 20:24

## 2018-07-27 ASSESSMENT — PAIN SCALES - GENERAL
PAINLEVEL_OUTOF10: 0

## 2018-07-27 NOTE — PROGRESS NOTES
[address] Progress Note    2018     Warden Walsh    MRN: [id number]    : 1960    Referring MD:  Raúl Michael  [facility]    SUBJECTIVE: Up in chair, feeling well; dysuria remains    ECOG PS:  (1) Restricted in physically strenuous activity, ambulatory and able to do work of light nature    Isolation: None    Medications    Scheduled Meds:   fluconazole  200 mg Oral Daily    famotidine  20 mg Oral BID    phenazopyridine  200 mg Oral TID WC    sodium chloride flush  10 mL Intravenous 2 times per day    Saline Mouthwash  15 mL Swish & Spit 4x Daily AC & HS    valACYclovir  500 mg Oral BID    piperacillin-tazobactam  4.5 g Intravenous Q6H     Continuous Infusions:   sodium chloride 20 mL/hr at 18 1634     PRN Meds:.acetaminophen, sucralfate, sodium chloride flush, potassium chloride, magnesium sulfate, Saline Mouthwash, alteplase, oxyCODONE-acetaminophen **OR** oxyCODONE-acetaminophen, ondansetron, prochlorperazine    ROS  · Constitutional: No fevers or chills. Ongoing fatigue & malaise, improving     · Eyes: No visual changes or diplopia. No scleral icterus. · ENT: No Headaches, hearing loss or vertigo. No mouth sores or sore throat. · Cardiovascular: No chest pain, dyspnea on exertion, palpitations or loss of consciousness. + BLE swelling  · Respiratory: No cough or wheezing, no sputum production. No hemoptysis. · Gastrointestinal: No abdominal pain, +appetite loss, No blood in stools. · Genitourinary: No dysuria, trouble voiding, or hematuria. · Musculoskeletal:  + generalized weakness. No joint complaints. · Integumentary: No rash or pruritis. · Neurological: No headache, diplopia. No change in gait, balance, or coordination. No paresthesias. · Endocrine: No temperature intolerance. No excessive thirst, fluid intake, or urination.    · Hematologic/Lymphatic: No abnormal bruising or ecchymoses, blood clots or swollen lymph nodes.  · Allergic/Immunologic: No nasal congestion or hives. Physical Exam:     I&O:      Intake/Output Summary (Last 24 hours) at 07/27/18 0746  Last data filed at 07/27/18 0600   Gross per 24 hour   Intake             2384 ml   Output             3100 ml   Net             -716 ml       Vital Signs:  /87   Pulse 84   Temp 98 °F (36.7 °C) (Oral)   Resp 18   Ht 5' 10\" (1.778 m)   Wt 186 lb (84.4 kg)   SpO2 95%   BMI 26.69 kg/m²     Weight:    Wt Readings from Last 3 Encounters:   07/26/18 186 lb (84.4 kg)   07/06/18 200 lb (90.7 kg)   06/25/18 214 lb 8.1 oz (97.3 kg)         General: Awake, alert and oriented.   HEENT: normocephalic, PERRL, no scleral erythema or icterus, Oral mucosa moist and intact, throat clear  NECK: supple without palpable adenopathy  BACK: Straight negative CVAT  SKIN: warm dry and intact without lesions rashes or masses  CHEST: CTA bilaterally without use of accessory muscles  CV: Normal S1 S2, RRR, no MRG  ABD: NT ND normoactive BS, no palpable masses or hepatosplenomegaly  EXTREMITIES: without edema, denies calf tenderness  NEURO: CN II - XII grossly intact  CATHETER: Right PAC (IR , 6/21/18) - No erythema or tenderness    Data    CBC:   Recent Labs      07/25/18   0903  07/26/18   0250  07/27/18   0350   WBC  0.4*  0.4*  0.4*   HGB  6.2*  7.2*  7.4*   HCT  17.6*  20.1*  21.0*   MCV  79.6*  79.0*  79.6*   PLT  33*  28*  37*     BMP/Mag:  Recent Labs      07/24/18   2154  07/25/18   0903  07/26/18   0250  07/27/18   0350   NA  139  139  141  139   K  3.7  4.0  3.8  3.5   CL  101  106  106  105   CO2  25  23  23  23   PHOS  2.6  3.3   --   4.4   BUN  15  11  13  12   CREATININE  0.6*  <0.5*  <0.5*  <0.5*     LIVP:   Recent Labs      07/24/18   2154  07/25/18   0903  07/27/18   0350   AST  24  29  16   ALT  24  30  24   BILIDIR  <0.2  <0.2  <0.2   BILITOT  0.4  0.8  0.5   ALKPHOS  40  45  48     Coags:   Recent Labs      07/24/18   2154  07/26/18   0250   PROTIME  13.0  13.8*

## 2018-07-28 VITALS
HEIGHT: 70 IN | OXYGEN SATURATION: 100 % | WEIGHT: 183 LBS | RESPIRATION RATE: 19 BRPM | HEART RATE: 72 BPM | DIASTOLIC BLOOD PRESSURE: 82 MMHG | BODY MASS INDEX: 26.2 KG/M2 | TEMPERATURE: 98.2 F | SYSTOLIC BLOOD PRESSURE: 132 MMHG

## 2018-07-28 LAB
ANION GAP SERPL CALCULATED.3IONS-SCNC: 13 MMOL/L (ref 3–16)
ANISOCYTOSIS: ABNORMAL
BASOPHILS ABSOLUTE: 0 K/UL (ref 0–0.2)
BASOPHILS RELATIVE PERCENT: 2 %
BUN BLDV-MCNC: 10 MG/DL (ref 7–20)
CALCIUM SERPL-MCNC: 9 MG/DL (ref 8.3–10.6)
CHLORIDE BLD-SCNC: 99 MMOL/L (ref 99–110)
CO2: 23 MMOL/L (ref 21–32)
CREAT SERPL-MCNC: 0.6 MG/DL (ref 0.9–1.3)
EOSINOPHILS ABSOLUTE: 0 K/UL (ref 0–0.6)
EOSINOPHILS RELATIVE PERCENT: 3 %
GFR AFRICAN AMERICAN: >60
GFR NON-AFRICAN AMERICAN: >60
GLUCOSE BLD-MCNC: 99 MG/DL (ref 70–99)
HCT VFR BLD CALC: 20.3 % (ref 40.5–52.5)
HEMOGLOBIN: 7.1 G/DL (ref 13.5–17.5)
LYMPHOCYTES ABSOLUTE: 0.5 K/UL (ref 1–5.1)
LYMPHOCYTES RELATIVE PERCENT: 78 %
MACROCYTES: ABNORMAL
MCH RBC QN AUTO: 28.1 PG (ref 26–34)
MCHC RBC AUTO-ENTMCNC: 34.9 G/DL (ref 31–36)
MCV RBC AUTO: 80.5 FL (ref 80–100)
MICROCYTES: ABNORMAL
MONOCYTES ABSOLUTE: 0.1 K/UL (ref 0–1.3)
MONOCYTES RELATIVE PERCENT: 13 %
NEUTROPHILS ABSOLUTE: 0 K/UL (ref 1.7–7.7)
NEUTROPHILS RELATIVE PERCENT: 4 %
PDW BLD-RTO: 18.8 % (ref 12.4–15.4)
PLATELET # BLD: 54 K/UL (ref 135–450)
PLATELET SLIDE REVIEW: ABNORMAL
PMV BLD AUTO: 8 FL (ref 5–10.5)
POTASSIUM SERPL-SCNC: 3.4 MMOL/L (ref 3.5–5.1)
RBC # BLD: 2.52 M/UL (ref 4.2–5.9)
SLIDE REVIEW: ABNORMAL
SODIUM BLD-SCNC: 135 MMOL/L (ref 136–145)
WBC # BLD: 0.7 K/UL (ref 4–11)

## 2018-07-28 PROCEDURE — 2580000003 HC RX 258: Performed by: INTERNAL MEDICINE

## 2018-07-28 PROCEDURE — 85025 COMPLETE CBC W/AUTO DIFF WBC: CPT

## 2018-07-28 PROCEDURE — 80048 BASIC METABOLIC PNL TOTAL CA: CPT

## 2018-07-28 PROCEDURE — 36591 DRAW BLOOD OFF VENOUS DEVICE: CPT

## 2018-07-28 PROCEDURE — 6370000000 HC RX 637 (ALT 250 FOR IP): Performed by: INTERNAL MEDICINE

## 2018-07-28 PROCEDURE — 6360000002 HC RX W HCPCS: Performed by: INTERNAL MEDICINE

## 2018-07-28 PROCEDURE — 6370000000 HC RX 637 (ALT 250 FOR IP): Performed by: NURSE PRACTITIONER

## 2018-07-28 RX ORDER — POTASSIUM CHLORIDE 600 MG/1
40 TABLET, FILM COATED, EXTENDED RELEASE ORAL ONCE
Status: COMPLETED | OUTPATIENT
Start: 2018-07-28 | End: 2018-07-28

## 2018-07-28 RX ORDER — LEVOFLOXACIN 750 MG/1
750 TABLET ORAL DAILY
Qty: 7 TABLET | Refills: 0 | Status: SHIPPED | OUTPATIENT
Start: 2018-07-29 | End: 2018-08-05

## 2018-07-28 RX ORDER — POTASSIUM CHLORIDE 20 MEQ/1
20 TABLET, EXTENDED RELEASE ORAL 2 TIMES DAILY
Qty: 60 TABLET | Refills: 3 | Status: SHIPPED | OUTPATIENT
Start: 2018-07-28 | End: 2018-10-20 | Stop reason: CLARIF

## 2018-07-28 RX ORDER — HEPARIN SODIUM (PORCINE) LOCK FLUSH IV SOLN 100 UNIT/ML 100 UNIT/ML
1000 SOLUTION INTRAVENOUS PRN
Status: DISCONTINUED | OUTPATIENT
Start: 2018-07-28 | End: 2018-07-28 | Stop reason: HOSPADM

## 2018-07-28 RX ORDER — LEVOFLOXACIN 750 MG/1
750 TABLET ORAL DAILY
Status: DISCONTINUED | OUTPATIENT
Start: 2018-07-28 | End: 2018-07-28 | Stop reason: HOSPADM

## 2018-07-28 RX ORDER — FAMOTIDINE 20 MG/1
20 TABLET, FILM COATED ORAL 2 TIMES DAILY
Qty: 60 TABLET | Refills: 3 | Status: ON HOLD | OUTPATIENT
Start: 2018-07-28 | End: 2018-09-04 | Stop reason: HOSPADM

## 2018-07-28 RX ORDER — PHENAZOPYRIDINE HYDROCHLORIDE 200 MG/1
200 TABLET, FILM COATED ORAL
Status: DISCONTINUED | OUTPATIENT
Start: 2018-07-28 | End: 2018-07-28 | Stop reason: HOSPADM

## 2018-07-28 RX ORDER — PHENAZOPYRIDINE HYDROCHLORIDE 200 MG/1
200 TABLET, FILM COATED ORAL
Qty: 6 TABLET | Refills: 0 | Status: SHIPPED | OUTPATIENT
Start: 2018-07-28 | End: 2018-07-30

## 2018-07-28 RX ADMIN — Medication 500 UNITS: at 12:38

## 2018-07-28 RX ADMIN — POTASSIUM CHLORIDE 40 MEQ: 600 TABLET, FILM COATED, EXTENDED RELEASE ORAL at 12:38

## 2018-07-28 RX ADMIN — FAMOTIDINE 20 MG: 20 TABLET ORAL at 09:40

## 2018-07-28 RX ADMIN — LEVOFLOXACIN 500 MG: 500 TABLET, FILM COATED ORAL at 09:40

## 2018-07-28 RX ADMIN — FLUCONAZOLE 200 MG: 200 TABLET ORAL at 09:39

## 2018-07-28 RX ADMIN — VALACYCLOVIR HYDROCHLORIDE 500 MG: 500 TABLET, FILM COATED ORAL at 09:40

## 2018-07-28 RX ADMIN — Medication 10 ML: at 09:44

## 2018-07-28 RX ADMIN — PHENAZOPYRIDINE HYDROCHLORIDE 200 MG: 200 TABLET ORAL at 13:20

## 2018-07-28 RX ADMIN — PHENAZOPYRIDINE HYDROCHLORIDE 200 MG: 200 TABLET ORAL at 09:38

## 2018-07-28 ASSESSMENT — PAIN DESCRIPTION - ONSET: ONSET: ON-GOING

## 2018-07-28 ASSESSMENT — PAIN DESCRIPTION - PAIN TYPE: TYPE: ACUTE PAIN

## 2018-07-28 ASSESSMENT — PAIN DESCRIPTION - FREQUENCY: FREQUENCY: INTERMITTENT

## 2018-07-28 ASSESSMENT — PAIN SCALES - GENERAL
PAINLEVEL_OUTOF10: 0
PAINLEVEL_OUTOF10: 8

## 2018-07-28 ASSESSMENT — ACTIVITIES OF DAILY LIVING (ADL): EFFECT OF PAIN ON DAILY ACTIVITIES: COMFORT

## 2018-07-28 ASSESSMENT — PAIN DESCRIPTION - DESCRIPTORS: DESCRIPTORS: BURNING

## 2018-07-28 ASSESSMENT — PAIN DESCRIPTION - LOCATION: LOCATION: OTHER (COMMENT);PENIS

## 2018-07-28 ASSESSMENT — PAIN DESCRIPTION - ORIENTATION: ORIENTATION: INNER

## 2018-07-28 ASSESSMENT — PAIN DESCRIPTION - PROGRESSION
CLINICAL_PROGRESSION: NOT CHANGED
CLINICAL_PROGRESSION: NOT CHANGED

## 2018-07-28 NOTE — PROGRESS NOTES
Reviewed discharge instructions with patient and  spouse. Reviewed discharge medications including dosing, schedule, indication, and adverse reactions. Reviewed which medications were already taken today and next dosage due for each medication. Reviewed signs and symptoms that prompt a call to the physician and appropriate phone numbers. Reviewed follow up appointments that have been made in [facility]. Low microbial diet, activity restrictions, and increased risk of infection were reviewed. Patient is being discharged with IV access d/t need for ongoing therapy:      Type:  port                        Plan:continue   Next heparin flush due on: 8/28/18  CVC care and maintenance was reviewed with patient and spouse. Pt verbalizes understanding of line care and maintenance. Patient verbalized understanding of all instructions and questions were answered to his. satisfaction. Signed discharge instructions were given to the patient and a copy placed in the paper-lite chart. Patient discharged to home per wheelchair with spouse.       Page Rosales

## 2018-07-28 NOTE — PROGRESS NOTES
[email or web address] Dis[facility] Summary        Aleena Quintanilla [facility] ADOLPH   1960   [id number]         Admission Date:  7/24/2018  6:34 PM   Admission Diagnosis:    · Neutropenic fever  · NHL  · Klebsiella cystitis      Discharge Date:  7/28/2018   Discharge Diagnoses:  · Neutropenic fever due to Klebsiella pneumoniae cystitis  · NHL         SUBJECTIVE: Up in chair, feeling well; dysuria remains    ECOG PS:  (1) Restricted in physically strenuous activity, ambulatory and able to do work of light nature    Isolation: None        ROS  · Constitutional: No fevers or chills. Ongoing fatigue & malaise, improving     · Eyes: No visual changes or diplopia. No scleral icterus. · ENT: No Headaches, hearing loss or vertigo. No mouth sores or sore throat. · Cardiovascular: No chest pain, dyspnea on exertion, palpitations or loss of consciousness. + BLE swelling  · Respiratory: No cough or wheezing, no sputum production. No hemoptysis. · Gastrointestinal: No abdominal pain, +appetite loss, No blood in stools. · Genitourinary: No dysuria, trouble voiding, or hematuria. · Musculoskeletal:  + generalized weakness. No joint complaints. · Integumentary: No rash or pruritis. · Neurological: No headache, diplopia. No change in gait, balance, or coordination. No paresthesias. · Endocrine: No temperature intolerance. No excessive thirst, fluid intake, or urination. · Hematologic/Lymphatic: No abnormal bruising or ecchymoses, blood clots or swollen lymph nodes. · Allergic/Immunologic: No nasal congestion or hives. Physical Exam:     Vital Signs:  /82   Pulse 72   Temp 98.2 °F (36.8 °C) (Oral)   Resp 19   Ht 5' 10\" (1.778 m)   Wt 183 lb (83 kg)   SpO2 100%   BMI 26.26 kg/m²     Weight:    Wt Readings from Last 3 Encounters:   07/28/18 183 lb (83 kg)   07/06/18 200 lb (90.7 kg)   06/25/18 214 lb 8.1 oz (97.3 kg)     General: Awake, alert and oriented.   HEENT: normocephalic, PERRL, no scleral erythema or icterus, PROBLEM LIST:           1. PTCL, Stabe IVb w/ BM involvement (Dx 5/7/18)  2. Depression / Anxiety  3. GERD  4. HTN    5. Neutropenic fever (7/25/18) d/t klebsiella pneumoniae UTI      TREATMENT:           1. CHOEP   Cycle #1 - 6/22/18   Cycle #2 - 7/18/18     ASSESSMENT AND PLAN:           1. PTCL, Stage IVb w/ BM involvement:    - Plan: CHOEP x 4 cycles, Restage and if in CR, then BEAM & ASCT     Cycle #2, Day +12: Will need dose reduction next cycle d/t severe pancytopenia     2. ID: Neutropenic fever POA, Afebrile now. Klebsiella pneumoniae UTI 7/24/18  - UCx w/>100K GNRs  - Blood Cxs 7/24/18 NGTD  - Cont levaquin for 7 days    Abx Hx  Zosyn 7/24-7/27     3. Heme: Pancytopenia related to recent chemotherapy  - Transfuse for Hgb < 7 and Platelets < 10 K.   - No transfusion today     4. Metabolic:  Electrolytes are WNL and renal fxn stable. - IVFs at [address] [place] [address] Potassium and Magnesium per PRN orders     5. GI: H/o GERD. +mid-epigastric pain which he relates to protonix. Admitted with Hgb 4.8, concern for GIB, however stool with no evidence of bleed  - Switch PPI BID to H2 blocker  - Cont carafate 1gm PO QID prn    6. Nutrition: Weight loss related to recent chemotherapy. Poor appetite & intake. - Cont low microbial diet  - Dietitian following  - Magic Cup BID     7. Depression / Anxiety  - Off all medications   - Psych to follow as needed    8.  Acute Pain: Abdominal pain (improved with PO intake) & Dysuria  - Cont Percocet PRN      Disposition:  Home today in stable condition; followup @ [facility] in [place], 7/31/18 @ 1:30 pm       Queenie Antonio [address] Medication Instructions NFV:[id number]    Printed on:07/28/18 5578   Medication Information                      acetaminophen (TYLENOL) 325 MG tablet  Take 2 tablets by mouth every 4 hours as needed for Pain             famotidine (PEPCID) 20 MG tablet  Take 1 tablet by mouth 2 times daily             levofloxacin (LEVAQUIN) 750 MG tablet  Take

## 2018-07-29 LAB — CULTURE, BLOOD 2: NORMAL

## 2018-07-30 LAB — BLOOD CULTURE, ROUTINE: NORMAL

## 2018-08-22 ENCOUNTER — HOSPITAL ENCOUNTER (OUTPATIENT)
Dept: CT IMAGING | Age: 58
Discharge: HOME | End: 2018-08-22
Payer: COMMERCIAL

## 2018-08-22 DIAGNOSIS — C84.40 ANGIOIMMUNOBLASTIC LYMPHOMA (HCC): ICD-10-CM

## 2018-08-22 PROCEDURE — 6360000004 HC RX CONTRAST MEDICATION: Performed by: INTERNAL MEDICINE

## 2018-08-22 PROCEDURE — 70491 CT SOFT TISSUE NECK W/DYE: CPT

## 2018-08-22 PROCEDURE — 71260 CT THORAX DX C+: CPT

## 2018-08-22 PROCEDURE — 74177 CT ABD & PELVIS W/CONTRAST: CPT

## 2018-08-22 RX ADMIN — IOHEXOL 50 ML: 240 INJECTION, SOLUTION INTRATHECAL; INTRAVASCULAR; INTRAVENOUS; ORAL at 12:43

## 2018-08-22 RX ADMIN — IOPAMIDOL 100 ML: 755 INJECTION, SOLUTION INTRAVENOUS at 12:43

## 2018-08-27 LAB
CULTURE, FUNGUS BLOOD: NORMAL
FUNGUS (MYCOLOGY) CULTURE: NORMAL
FUNGUS (MYCOLOGY) CULTURE: NORMAL
FUNGUS STAIN: NORMAL
FUNGUS STAIN: NORMAL

## 2018-08-29 ENCOUNTER — ANESTHESIA EVENT (OUTPATIENT)
Dept: ENDOSCOPY | Age: 58
DRG: 841 | End: 2018-08-29
Payer: COMMERCIAL

## 2018-08-29 ENCOUNTER — HOSPITAL ENCOUNTER (INPATIENT)
Age: 58
LOS: 6 days | Discharge: HOME | DRG: 841 | End: 2018-09-04
Attending: INTERNAL MEDICINE | Admitting: INTERNAL MEDICINE
Payer: COMMERCIAL

## 2018-08-29 ENCOUNTER — APPOINTMENT (OUTPATIENT)
Dept: GENERAL RADIOLOGY | Age: 58
DRG: 841 | End: 2018-08-29
Attending: INTERNAL MEDICINE
Payer: COMMERCIAL

## 2018-08-29 ENCOUNTER — ANESTHESIA (OUTPATIENT)
Dept: ENDOSCOPY | Age: 58
DRG: 841 | End: 2018-08-29
Payer: COMMERCIAL

## 2018-08-29 VITALS — SYSTOLIC BLOOD PRESSURE: 136 MMHG | DIASTOLIC BLOOD PRESSURE: 92 MMHG | OXYGEN SATURATION: 99 %

## 2018-08-29 PROBLEM — C84.49: Status: ACTIVE | Noted: 2018-08-29

## 2018-08-29 LAB
ALBUMIN SERPL-MCNC: 3.5 G/DL (ref 3.4–5)
ALP BLD-CCNC: 24 U/L (ref 40–129)
ALT SERPL-CCNC: 12 U/L (ref 10–40)
ANION GAP SERPL CALCULATED.3IONS-SCNC: 10 MMOL/L (ref 3–16)
ANISOCYTOSIS: ABNORMAL
APTT: 36.7 SEC (ref 26–36)
AST SERPL-CCNC: 16 U/L (ref 15–37)
BANDED NEUTROPHILS RELATIVE PERCENT: 3 % (ref 0–7)
BASOPHILS ABSOLUTE: 0 K/UL (ref 0–0.2)
BASOPHILS RELATIVE PERCENT: 0 %
BILIRUB SERPL-MCNC: 0.5 MG/DL (ref 0–1)
BILIRUBIN DIRECT: <0.2 MG/DL (ref 0–0.3)
BILIRUBIN, INDIRECT: ABNORMAL MG/DL (ref 0–1)
BUN BLDV-MCNC: 9 MG/DL (ref 7–20)
CALCIUM SERPL-MCNC: 8.6 MG/DL (ref 8.3–10.6)
CHLORIDE BLD-SCNC: 92 MMOL/L (ref 99–110)
CO2: 28 MMOL/L (ref 21–32)
CREAT SERPL-MCNC: <0.5 MG/DL (ref 0.9–1.3)
EOSINOPHILS ABSOLUTE: 0 K/UL (ref 0–0.6)
EOSINOPHILS RELATIVE PERCENT: 0 %
GFR AFRICAN AMERICAN: >60
GFR NON-AFRICAN AMERICAN: >60
GLUCOSE BLD-MCNC: 89 MG/DL (ref 70–99)
HCT VFR BLD CALC: 34.9 % (ref 40.5–52.5)
HEMOGLOBIN: 12 G/DL (ref 13.5–17.5)
INR BLD: 1.38 (ref 0.86–1.14)
LACTATE DEHYDROGENASE: 285 U/L (ref 100–190)
LYMPHOCYTES ABSOLUTE: 0.7 K/UL (ref 1–5.1)
LYMPHOCYTES RELATIVE PERCENT: 10 %
MAGNESIUM: 1.8 MG/DL (ref 1.8–2.4)
MCH RBC QN AUTO: 29.2 PG (ref 26–34)
MCHC RBC AUTO-ENTMCNC: 34.3 G/DL (ref 31–36)
MCV RBC AUTO: 85 FL (ref 80–100)
METAMYELOCYTES RELATIVE PERCENT: 1 %
MONOCYTES ABSOLUTE: 0.4 K/UL (ref 0–1.3)
MONOCYTES RELATIVE PERCENT: 6 %
MYELOCYTE PERCENT: 1 %
NEUTROPHILS ABSOLUTE: 5.7 K/UL (ref 1.7–7.7)
NEUTROPHILS RELATIVE PERCENT: 79 %
PDW BLD-RTO: 17.7 % (ref 12.4–15.4)
PHOSPHORUS: 3.6 MG/DL (ref 2.5–4.9)
PLATELET # BLD: 108 K/UL (ref 135–450)
PLATELET SLIDE REVIEW: ABNORMAL
PMV BLD AUTO: 6.9 FL (ref 5–10.5)
POTASSIUM SERPL-SCNC: 3.8 MMOL/L (ref 3.5–5.1)
PROTHROMBIN TIME: 15.7 SEC (ref 9.8–13)
RBC # BLD: 4.1 M/UL (ref 4.2–5.9)
SLIDE REVIEW: ABNORMAL
SODIUM BLD-SCNC: 130 MMOL/L (ref 136–145)
TOTAL PROTEIN: 5.6 G/DL (ref 6.4–8.2)
URIC ACID, SERUM: 2 MG/DL (ref 3.5–7.2)
WBC # BLD: 6.8 K/UL (ref 4–11)

## 2018-08-29 PROCEDURE — 2500000003 HC RX 250 WO HCPCS: Performed by: NURSE ANESTHETIST, CERTIFIED REGISTERED

## 2018-08-29 PROCEDURE — 84550 ASSAY OF BLOOD/URIC ACID: CPT

## 2018-08-29 PROCEDURE — 3609012400 HC EGD TRANSORAL BIOPSY SINGLE/MULTIPLE: Performed by: INTERNAL MEDICINE

## 2018-08-29 PROCEDURE — 6370000000 HC RX 637 (ALT 250 FOR IP): Performed by: NURSE PRACTITIONER

## 2018-08-29 PROCEDURE — 71046 X-RAY EXAM CHEST 2 VIEWS: CPT

## 2018-08-29 PROCEDURE — 3700000001 HC ADD 15 MINUTES (ANESTHESIA): Performed by: INTERNAL MEDICINE

## 2018-08-29 PROCEDURE — 85610 PROTHROMBIN TIME: CPT

## 2018-08-29 PROCEDURE — 2709999900 HC NON-CHARGEABLE SUPPLY: Performed by: INTERNAL MEDICINE

## 2018-08-29 PROCEDURE — 83735 ASSAY OF MAGNESIUM: CPT

## 2018-08-29 PROCEDURE — 80048 BASIC METABOLIC PNL TOTAL CA: CPT

## 2018-08-29 PROCEDURE — 87081 CULTURE SCREEN ONLY: CPT

## 2018-08-29 PROCEDURE — 2580000003 HC RX 258: Performed by: NURSE PRACTITIONER

## 2018-08-29 PROCEDURE — 7100000011 HC PHASE II RECOVERY - ADDTL 15 MIN: Performed by: INTERNAL MEDICINE

## 2018-08-29 PROCEDURE — 7100000010 HC PHASE II RECOVERY - FIRST 15 MIN: Performed by: INTERNAL MEDICINE

## 2018-08-29 PROCEDURE — 6360000002 HC RX W HCPCS: Performed by: NURSE ANESTHETIST, CERTIFIED REGISTERED

## 2018-08-29 PROCEDURE — 93005 ELECTROCARDIOGRAM TRACING: CPT | Performed by: NURSE PRACTITIONER

## 2018-08-29 PROCEDURE — 0DB68ZX EXCISION OF STOMACH, VIA NATURAL OR ARTIFICIAL OPENING ENDOSCOPIC, DIAGNOSTIC: ICD-10-PCS | Performed by: INTERNAL MEDICINE

## 2018-08-29 PROCEDURE — 85025 COMPLETE CBC W/AUTO DIFF WBC: CPT

## 2018-08-29 PROCEDURE — 6360000002 HC RX W HCPCS: Performed by: NURSE PRACTITIONER

## 2018-08-29 PROCEDURE — 0DB98ZX EXCISION OF DUODENUM, VIA NATURAL OR ARTIFICIAL OPENING ENDOSCOPIC, DIAGNOSTIC: ICD-10-PCS | Performed by: INTERNAL MEDICINE

## 2018-08-29 PROCEDURE — 36591 DRAW BLOOD OFF VENOUS DEVICE: CPT

## 2018-08-29 PROCEDURE — 3700000000 HC ANESTHESIA ATTENDED CARE: Performed by: INTERNAL MEDICINE

## 2018-08-29 PROCEDURE — 2580000003 HC RX 258: Performed by: NURSE ANESTHETIST, CERTIFIED REGISTERED

## 2018-08-29 PROCEDURE — 84100 ASSAY OF PHOSPHORUS: CPT

## 2018-08-29 PROCEDURE — 2720000010 HC SURG SUPPLY STERILE: Performed by: INTERNAL MEDICINE

## 2018-08-29 PROCEDURE — 85730 THROMBOPLASTIN TIME PARTIAL: CPT

## 2018-08-29 PROCEDURE — 0DB48ZX EXCISION OF ESOPHAGOGASTRIC JUNCTION, VIA NATURAL OR ARTIFICIAL OPENING ENDOSCOPIC, DIAGNOSTIC: ICD-10-PCS | Performed by: INTERNAL MEDICINE

## 2018-08-29 PROCEDURE — 80076 HEPATIC FUNCTION PANEL: CPT

## 2018-08-29 PROCEDURE — 88305 TISSUE EXAM BY PATHOLOGIST: CPT

## 2018-08-29 PROCEDURE — 83615 LACTATE (LD) (LDH) ENZYME: CPT

## 2018-08-29 PROCEDURE — 2060000000 HC ICU INTERMEDIATE R&B

## 2018-08-29 PROCEDURE — 93010 ELECTROCARDIOGRAM REPORT: CPT | Performed by: INTERNAL MEDICINE

## 2018-08-29 RX ORDER — PROPOFOL 10 MG/ML
INJECTION, EMULSION INTRAVENOUS CONTINUOUS PRN
Status: DISCONTINUED | OUTPATIENT
Start: 2018-08-29 | End: 2018-08-29

## 2018-08-29 RX ORDER — POTASSIUM CHLORIDE 29.8 MG/ML
20 INJECTION INTRAVENOUS PRN
Status: DISCONTINUED | OUTPATIENT
Start: 2018-08-29 | End: 2018-09-04 | Stop reason: HOSPADM

## 2018-08-29 RX ORDER — SODIUM CHLORIDE 0.9 % (FLUSH) 0.9 %
10 SYRINGE (ML) INJECTION PRN
Status: DISCONTINUED | OUTPATIENT
Start: 2018-08-29 | End: 2018-09-04 | Stop reason: HOSPADM

## 2018-08-29 RX ORDER — MAGNESIUM SULFATE IN WATER 40 MG/ML
4 INJECTION, SOLUTION INTRAVENOUS PRN
Status: DISCONTINUED | OUTPATIENT
Start: 2018-08-29 | End: 2018-09-04 | Stop reason: HOSPADM

## 2018-08-29 RX ORDER — PROPOFOL 10 MG/ML
INJECTION, EMULSION INTRAVENOUS CONTINUOUS PRN
Status: DISCONTINUED | OUTPATIENT
Start: 2018-08-29 | End: 2018-08-29 | Stop reason: SDUPTHER

## 2018-08-29 RX ORDER — SODIUM CHLORIDE 9 MG/ML
INJECTION, SOLUTION INTRAVENOUS CONTINUOUS PRN
Status: DISCONTINUED | OUTPATIENT
Start: 2018-08-29 | End: 2018-09-04 | Stop reason: HOSPADM

## 2018-08-29 RX ORDER — ACETAMINOPHEN 325 MG/1
650 TABLET ORAL EVERY 4 HOURS PRN
Status: DISCONTINUED | OUTPATIENT
Start: 2018-08-29 | End: 2018-09-04 | Stop reason: HOSPADM

## 2018-08-29 RX ORDER — SUCRALFATE ORAL 1 G/10ML
1 SUSPENSION ORAL EVERY 6 HOURS SCHEDULED
Status: DISCONTINUED | OUTPATIENT
Start: 2018-08-29 | End: 2018-09-04 | Stop reason: HOSPADM

## 2018-08-29 RX ORDER — SODIUM CHLORIDE 9 MG/ML
INJECTION, SOLUTION INTRAVENOUS CONTINUOUS
Status: DISCONTINUED | OUTPATIENT
Start: 2018-08-29 | End: 2018-08-31 | Stop reason: ALTCHOICE

## 2018-08-29 RX ORDER — SODIUM CHLORIDE 0.9 % (FLUSH) 0.9 %
10 SYRINGE (ML) INJECTION EVERY 12 HOURS SCHEDULED
Status: DISCONTINUED | OUTPATIENT
Start: 2018-08-29 | End: 2018-09-04 | Stop reason: HOSPADM

## 2018-08-29 RX ORDER — BUPROPION HYDROCHLORIDE 100 MG/1
100 TABLET ORAL DAILY
Status: ON HOLD | COMMUNITY
End: 2018-09-04 | Stop reason: HOSPADM

## 2018-08-29 RX ORDER — FAMOTIDINE 20 MG/1
20 TABLET, FILM COATED ORAL 2 TIMES DAILY
Status: DISCONTINUED | OUTPATIENT
Start: 2018-08-29 | End: 2018-08-31 | Stop reason: SDUPTHER

## 2018-08-29 RX ORDER — PROCHLORPERAZINE MALEATE 10 MG
10 TABLET ORAL EVERY 6 HOURS PRN
Status: DISCONTINUED | OUTPATIENT
Start: 2018-08-29 | End: 2018-08-31 | Stop reason: ALTCHOICE

## 2018-08-29 RX ORDER — SODIUM CHLORIDE 9 MG/ML
INJECTION, SOLUTION INTRAVENOUS CONTINUOUS PRN
Status: DISCONTINUED | OUTPATIENT
Start: 2018-08-29 | End: 2018-08-29 | Stop reason: SDUPTHER

## 2018-08-29 RX ORDER — BUPROPION HYDROCHLORIDE 100 MG/1
100 TABLET ORAL DAILY
Status: DISCONTINUED | OUTPATIENT
Start: 2018-08-29 | End: 2018-08-30

## 2018-08-29 RX ORDER — LIDOCAINE HYDROCHLORIDE 20 MG/ML
INJECTION, SOLUTION INFILTRATION; PERINEURAL PRN
Status: DISCONTINUED | OUTPATIENT
Start: 2018-08-29 | End: 2018-08-29 | Stop reason: SDUPTHER

## 2018-08-29 RX ADMIN — SUCRALFATE 1 G: 1 SUSPENSION ORAL at 16:36

## 2018-08-29 RX ADMIN — LIDOCAINE HYDROCHLORIDE 60 MG: 20 INJECTION, SOLUTION INFILTRATION; PERINEURAL at 14:52

## 2018-08-29 RX ADMIN — Medication 10 ML: at 22:12

## 2018-08-29 RX ADMIN — SODIUM CHLORIDE: 9 INJECTION, SOLUTION INTRAVENOUS at 10:35

## 2018-08-29 RX ADMIN — ENOXAPARIN SODIUM 40 MG: 40 INJECTION SUBCUTANEOUS at 22:10

## 2018-08-29 RX ADMIN — FAMOTIDINE 20 MG: 20 TABLET ORAL at 22:10

## 2018-08-29 RX ADMIN — PROCHLORPERAZINE MALEATE 10 MG: 10 TABLET, FILM COATED ORAL at 12:34

## 2018-08-29 RX ADMIN — SODIUM CHLORIDE 15 ML: 900 IRRIGANT IRRIGATION at 16:39

## 2018-08-29 RX ADMIN — Medication 10 ML: at 16:39

## 2018-08-29 RX ADMIN — SODIUM CHLORIDE: 900 INJECTION, SOLUTION INTRAVENOUS at 14:51

## 2018-08-29 RX ADMIN — PROPOFOL 100 MCG/KG/MIN: 10 INJECTION, EMULSION INTRAVENOUS at 14:52

## 2018-08-29 ASSESSMENT — PULMONARY FUNCTION TESTS
PIF_VALUE: 0

## 2018-08-29 ASSESSMENT — PAIN SCALES - GENERAL
PAINLEVEL_OUTOF10: 0

## 2018-08-29 NOTE — H&P
Axilla Lymph Node Biopsy (5/7/18):    2.  BM bx/asp (5/8/18): IHC:       Cytogenetic:  No growth in culture   FISH:  WNL     PROBLEM LIST:             1. PTCL, Stabe IVb w/ BM involvement (Dx 5/7/18)  2. Depression / Anxiety  3. GERD  4. HTN  5. Neutropenic Fever (7/2/18)      TREATMENT:            1. CHOEP   Cycle #1 - 6/22/18  Cycle #2 - 7/24/18      ASSESSMENT AND PLAN:           1. Refractory PTCL, Stage IVb w/ BM involvement:   - CHOEP x 2 cycles w/ persistent lymphadenopathy on CT scan (8/22/18). Will ask radiology to compare recent CT scans from 8/22/18 to original PET scan in May 2018  -  Now w/ anorexia, abd discomfort and declining performance status concerning for refractory disease     2. ID: Patient is currently afebrile  - Not on abx     3. Heme: Anemia from previous chemotherapy   - Transfuse for Hgb < 7 and Platelets < 10 K.   - No transfusion today     4. Metabolic: Stable renal fxn and e-lytes  - Start NS @ 50 mL/hr  - Replace Mg+ and K+ per protocol     5. Nutrition / GI: Increased abd pain and anorexia, may be from refractory disease  - Consult GI, Dr. China Darling for EGD  - Cont Pepcid bid  - Start Carafate qid  - Cont Compazine as needed for nausea  - Cont low microbial diet  - Ask dietary to see patient     7. Depression / Anxiety: Very anxious and depressed  - Cont Wellbutrin daily    - Psych to follow     - DVT Prophylaxis: Platelets >07,411 cells/dL, - daily lovenox prophylaxis ordered  Contraindications to pharmacologic prophylaxis: None  Contraindications to mechanical prophylaxis: None      - Disposition: Once chemotherapy completes w/out toxicity    The patient was seen and examined by Dr. Wilton Santiago. This admission history and physical has been discussed and agreed upon by Dr. Wilton Santiago.     Cherylene Bio, APRN - CNP

## 2018-08-29 NOTE — ANESTHESIA POSTPROCEDURE EVALUATION
Department of Anesthesiology  Postprocedure Note    Patient: Roxann Llamas  MRN: [id number]  YOB: 1960  Date of evaluation: 8/29/2018  Time:  4:55 PM     Procedure Summary     Date:  08/29/18 Room / Location:  [facility] ENDO 02 / [facility] ENDOSCOPY    Anesthesia Start:  1709 Anesthesia Stop:  6388    Procedure:  EGD BIOPSY (N/A ) Diagnosis:  (Lymphoma peripheral T-c)    Surgeon:  Varghese Sales MD Responsible Provider:  Laura Vivar MD    Anesthesia Type:  MAC ASA Status:  3          Anesthesia Type: MAC    Jazmín Phase I: Jazmín Score: 10    Jazmín Phase II: Jazmín Score: 8    Last vitals: Reviewed and per EMR flowsheets. Anesthesia Post Evaluation    Patient location during evaluation: ICU  Patient participation: complete - patient participated  Level of consciousness: awake and alert  Pain scale: please refer to nursing notes.   Airway patency: patent  Nausea & Vomiting: no nausea and no vomiting  Complications: no  Cardiovascular status: hemodynamically stable  Respiratory status: spontaneous ventilation  Hydration status: stable

## 2018-08-29 NOTE — PROCEDURES
[facility]  EGD REPORT    Patient:  Peyton Scott                  1960    Referring Physician:  Andree Poon    Endoscopist: Lizeth Morris     Indication:  Nausea/anorexia     Medications:  MAC    Pre-Anesthesia Assessment:  I have reviewed and am in agreement with patient history and medication, including previous response to sedation. Prior to the procedure, a History and Physical was performed, and patient medications and allergies were reviewed. The patient is competent. The risks and benefits of the procedure and the sedation options and risks were discussed with the patient. Risks discussed included but were not limited to infection, bleeding, perforation, death, and missed lesions. All questions were answered and informed consent was obtained. Patient identification and proposed procedure were verified by the physician and the nurse in the pre-procedure area in the procedure room. [place]: II  ASA Grade Assessment: 3     After reviewing the risks and benefits, the patient was deemed in satisfactory condition to undergo the procedure. The anesthesia plan was to use MAC anesthesia. Immediately prior to administration of medications, the patient was re-assessed for adequacy to receive sedatives and a time out was performed. Patient and healthcare providers were in agreement it was the correct patient and procedure. The heart rate, respiratory rate, oxygen saturations, blood pressure, adequacy of pulmonary ventilation, and response to care were monitored throughout the procedure. The physical status of the patient was re-assessed after the procedure. After obtaining informed consent, the endoscope was passed under direct vision. The endoscope was introduced through the mouth, and advanced to the second part of duodenum. The EGD was accomplished without difficulty. The patient tolerated the procedure well.        Duodenum: mild atrophy and biopsies obtained  Stomach:

## 2018-08-29 NOTE — ANESTHESIA PRE PROCEDURE
1235    Saline Mouthwash 15 mL  15 mL Swish & Spit Q4H PRN Bailey Hammed, APRN - CNP        sodium chloride flush 0.9 % injection 10 mL  10 mL Intravenous 2 times per day Bailey Hammed, APRN - CNP        sodium chloride flush 0.9 % injection 10 mL  10 mL Intravenous PRN Bailey Hammed, APRN - CNP        0.9 % sodium chloride infusion   Intravenous Continuous Bailey Hammed, APRN - CNP 50 mL/hr at 08/29/18 1035      sucralfate (CARAFATE) 1 GM/10ML suspension 1 g  1 g Oral 4 times per day Mariely Plate, APRN - CNP        acetaminophen (TYLENOL) tablet 650 mg  650 mg Oral Q4H PRN Mariely Plate, APRN - CNP        buPROPion [facility]) tablet 100 mg  100 mg Oral Daily Gayle M Jhonny, APRN - CNP        famotidine (PEPCID) tablet 20 mg  20 mg Oral BID Gayle M Jhonny, APRN - CNP        prochlorperazine (COMPAZINE) tablet 10 mg  10 mg Oral Q6H PRN Mariely Plate, APRN - CNP   10 mg at 08/29/18 1234       Allergies:     Allergies   Allergen Reactions    Clonazepam     Other Rash     Any medication containing sulfa - had reaction to BP medication with sulfa in it    Sulfa Antibiotics Rash     Reaction: neutropenia        Problem List:    Patient Active Problem List   Diagnosis Code    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) C84.49    GERD (gastroesophageal reflux disease) K21.9    Anxiety F41.9    HTN (hypertension) I10    Cancer ([facility] [address] C80.1    Hiatal hernia K44.9    Neutropenic fever ([facility] [address] D70.9, R50.81    Lymphoma, peripheral T-cell ([facility] [address] C84.49       Past Medical History:        Diagnosis Date    Anxiety     [facility]    Peripheral T - Cell Lymphoma, Stage IVb    GERD (gastroesophageal reflux disease)     Hiatal hernia     History of blood transfusion     HTN (hypertension)        Past Surgical History:        Procedure Laterality Date    APPENDECTOMY      INGUINAL HERNIA REPAIR Right        Social History:    Social History   Substance Use Topics    Smoking status: Never Smoker    Smokeless tobacco: Never Used    Alcohol use No                                Counseling given: Not Answered      Vital Signs (Current):   Vitals:    08/29/18 0937   BP: (!) 131/92   Pulse: 76   Resp: 16   Temp: 96.8 °F (36 °C)   TempSrc: Temporal   SpO2: 100%                                              BP Readings from Last 3 Encounters:   08/29/18 (!) 131/92   07/28/18 132/82   07/06/18 131/78       NPO Status: Time of last liquid consumption: 0000                        Time of last solid consumption: 2134                        Date of last liquid consumption: 08/28/18                        Date of last solid food consumption: 08/21/18    BMI:   Wt Readings from Last 3 Encounters:   07/28/18 183 lb (83 kg)   07/06/18 200 lb (90.7 kg)   06/25/18 214 lb 8.1 oz (97.3 kg)     There is no height or weight on file to calculate BMI.    CBC:   Lab Results   Component Value Date    WBC 6.8 08/29/2018    RBC 4.10 08/29/2018    HGB 12.0 08/29/2018    HCT 34.9 08/29/2018    MCV 85.0 08/29/2018    RDW 17.7 08/29/2018     08/29/2018       CMP:   Lab Results   Component Value Date     08/29/2018    K 3.8 08/29/2018    CL 92 08/29/2018    CO2 28 08/29/2018    BUN 9 08/29/2018    CREATININE <0.5 08/29/2018    GFRAA >60 08/29/2018    LABGLOM >60 08/29/2018    GLUCOSE 89 08/29/2018    PROT 5.6 08/29/2018    CALCIUM 8.6 08/29/2018    BILITOT 0.5 08/29/2018    ALKPHOS 24 08/29/2018    AST 16 08/29/2018    ALT 12 08/29/2018       POC Tests: No results for input(s): POCGLU, POCNA, POCK, POCCL, POCBUN, POCHEMO, POCHCT in the last 72 hours.     Coags:   Lab Results   Component Value Date    PROTIME 15.7 08/29/2018    INR 1.38 08/29/2018    APTT 36.7 08/29/2018       HCG (If Applicable): No results found for: PREGTESTUR, PREGSERUM, HCG, HCGQUANT     ABGs: No results found for: PHART, PO2ART, ESM8AQP, RVB5LSQ, BEART, N3FHVNGK     Type & Screen (If Applicable):  No results found for: LABABO, [address]    Anesthesia Evaluation  Patient summary reviewed no history of anesthetic complications:   Airway:      Neck ROM: full   Dental: normal exam         Pulmonary:Negative Pulmonary ROS                              Cardiovascular:    (+) hypertension:,                   Neuro/Psych:   (+) psychiatric history:depression/anxiety             GI/Hepatic/Renal:   (+) GERD:,           Endo/Other:                      ROS comment: T cell lymphoma Abdominal:           Vascular:                                        Anesthesia Plan      MAC     ASA 3       Induction: intravenous. Anesthetic plan and risks discussed with patient.                       Lennox Asencio MD   8/29/2018

## 2018-08-30 LAB
ALBUMIN SERPL-MCNC: 3.2 G/DL (ref 3.4–5)
ALP BLD-CCNC: 22 U/L (ref 40–129)
ALT SERPL-CCNC: 11 U/L (ref 10–40)
ANION GAP SERPL CALCULATED.3IONS-SCNC: 9 MMOL/L (ref 3–16)
APTT: 31.3 SEC (ref 26–36)
AST SERPL-CCNC: 15 U/L (ref 15–37)
BASOPHILS ABSOLUTE: 0 K/UL (ref 0–0.2)
BASOPHILS RELATIVE PERCENT: 0.7 %
BILIRUB SERPL-MCNC: 0.4 MG/DL (ref 0–1)
BILIRUBIN DIRECT: <0.2 MG/DL (ref 0–0.3)
BILIRUBIN URINE: NEGATIVE
BILIRUBIN, INDIRECT: ABNORMAL MG/DL (ref 0–1)
BLOOD, URINE: NEGATIVE
BUN BLDV-MCNC: 9 MG/DL (ref 7–20)
CALCIUM SERPL-MCNC: 8.4 MG/DL (ref 8.3–10.6)
CHLORIDE BLD-SCNC: 98 MMOL/L (ref 99–110)
CLARITY: CLEAR
CO2: 28 MMOL/L (ref 21–32)
COLOR: YELLOW
CREAT SERPL-MCNC: <0.5 MG/DL (ref 0.9–1.3)
EOSINOPHILS ABSOLUTE: 0 K/UL (ref 0–0.6)
EOSINOPHILS RELATIVE PERCENT: 0.4 %
GFR AFRICAN AMERICAN: >60
GFR NON-AFRICAN AMERICAN: >60
GLUCOSE BLD-MCNC: 87 MG/DL (ref 70–99)
GLUCOSE URINE: NEGATIVE MG/DL
HCT VFR BLD CALC: 31.2 % (ref 40.5–52.5)
HEMOGLOBIN: 10.7 G/DL (ref 13.5–17.5)
INR BLD: 1.33 (ref 0.86–1.14)
KETONES, URINE: NEGATIVE MG/DL
LACTATE DEHYDROGENASE: 238 U/L (ref 100–190)
LEUKOCYTE ESTERASE, URINE: NEGATIVE
LYMPHOCYTES ABSOLUTE: 1.1 K/UL (ref 1–5.1)
LYMPHOCYTES RELATIVE PERCENT: 18.2 %
MAGNESIUM: 1.7 MG/DL (ref 1.8–2.4)
MCH RBC QN AUTO: 29.1 PG (ref 26–34)
MCHC RBC AUTO-ENTMCNC: 34.2 G/DL (ref 31–36)
MCV RBC AUTO: 85.2 FL (ref 80–100)
MICROSCOPIC EXAMINATION: NORMAL
MONOCYTES ABSOLUTE: 0.5 K/UL (ref 0–1.3)
MONOCYTES RELATIVE PERCENT: 7.8 %
NEUTROPHILS ABSOLUTE: 4.5 K/UL (ref 1.7–7.7)
NEUTROPHILS RELATIVE PERCENT: 72.9 %
NITRITE, URINE: NEGATIVE
PDW BLD-RTO: 17.3 % (ref 12.4–15.4)
PH UA: 7.5
PHOSPHORUS: 3.6 MG/DL (ref 2.5–4.9)
PLATELET # BLD: 95 K/UL (ref 135–450)
PMV BLD AUTO: 6.7 FL (ref 5–10.5)
POTASSIUM SERPL-SCNC: 4.1 MMOL/L (ref 3.5–5.1)
PROTEIN UA: NEGATIVE MG/DL
PROTHROMBIN TIME: 15.2 SEC (ref 9.8–13)
RBC # BLD: 3.66 M/UL (ref 4.2–5.9)
SLIDE REVIEW: ABNORMAL
SODIUM BLD-SCNC: 135 MMOL/L (ref 136–145)
SPECIFIC GRAVITY UA: 1.01
TOTAL PROTEIN: 5 G/DL (ref 6.4–8.2)
URIC ACID, SERUM: 1.7 MG/DL (ref 3.5–7.2)
URINE TYPE: NORMAL
UROBILINOGEN, URINE: 1 E.U./DL
WBC # BLD: 6.2 K/UL (ref 4–11)

## 2018-08-30 PROCEDURE — 85025 COMPLETE CBC W/AUTO DIFF WBC: CPT

## 2018-08-30 PROCEDURE — 80048 BASIC METABOLIC PNL TOTAL CA: CPT

## 2018-08-30 PROCEDURE — 83615 LACTATE (LD) (LDH) ENZYME: CPT

## 2018-08-30 PROCEDURE — 36591 DRAW BLOOD OFF VENOUS DEVICE: CPT

## 2018-08-30 PROCEDURE — 85610 PROTHROMBIN TIME: CPT

## 2018-08-30 PROCEDURE — 2060000000 HC ICU INTERMEDIATE R&B

## 2018-08-30 PROCEDURE — 83735 ASSAY OF MAGNESIUM: CPT

## 2018-08-30 PROCEDURE — 6360000002 HC RX W HCPCS: Performed by: NURSE PRACTITIONER

## 2018-08-30 PROCEDURE — 6370000000 HC RX 637 (ALT 250 FOR IP): Performed by: NURSE PRACTITIONER

## 2018-08-30 PROCEDURE — 84100 ASSAY OF PHOSPHORUS: CPT

## 2018-08-30 PROCEDURE — 81003 URINALYSIS AUTO W/O SCOPE: CPT

## 2018-08-30 PROCEDURE — 84550 ASSAY OF BLOOD/URIC ACID: CPT

## 2018-08-30 PROCEDURE — 6370000000 HC RX 637 (ALT 250 FOR IP): Performed by: INTERNAL MEDICINE

## 2018-08-30 PROCEDURE — 80076 HEPATIC FUNCTION PANEL: CPT

## 2018-08-30 PROCEDURE — 85730 THROMBOPLASTIN TIME PARTIAL: CPT

## 2018-08-30 PROCEDURE — 2580000003 HC RX 258: Performed by: NURSE PRACTITIONER

## 2018-08-30 RX ORDER — BUPROPION HYDROCHLORIDE 150 MG/1
150 TABLET ORAL DAILY
Status: DISCONTINUED | OUTPATIENT
Start: 2018-08-31 | End: 2018-09-04 | Stop reason: HOSPADM

## 2018-08-30 RX ORDER — PANTOPRAZOLE SODIUM 40 MG/1
40 TABLET, DELAYED RELEASE ORAL
Status: DISCONTINUED | OUTPATIENT
Start: 2018-08-30 | End: 2018-08-31

## 2018-08-30 RX ADMIN — ENOXAPARIN SODIUM 40 MG: 40 INJECTION SUBCUTANEOUS at 08:20

## 2018-08-30 RX ADMIN — FAMOTIDINE 20 MG: 20 TABLET ORAL at 08:19

## 2018-08-30 RX ADMIN — FAMOTIDINE 20 MG: 20 TABLET ORAL at 20:19

## 2018-08-30 RX ADMIN — SODIUM CHLORIDE: 9 INJECTION, SOLUTION INTRAVENOUS at 20:19

## 2018-08-30 RX ADMIN — SUCRALFATE 1 G: 1 SUSPENSION ORAL at 00:50

## 2018-08-30 RX ADMIN — Medication 10 ML: at 20:19

## 2018-08-30 RX ADMIN — SUCRALFATE 1 G: 1 SUSPENSION ORAL at 18:24

## 2018-08-30 RX ADMIN — Medication 10 ML: at 08:20

## 2018-08-30 RX ADMIN — SUCRALFATE 1 G: 1 SUSPENSION ORAL at 23:03

## 2018-08-30 RX ADMIN — BUPROPION HYDROCHLORIDE 100 MG: 100 TABLET, FILM COATED ORAL at 08:19

## 2018-08-30 RX ADMIN — PANTOPRAZOLE SODIUM 40 MG: 40 TABLET, DELAYED RELEASE ORAL at 18:24

## 2018-08-30 RX ADMIN — SODIUM CHLORIDE: 9 INJECTION, SOLUTION INTRAVENOUS at 04:06

## 2018-08-30 RX ADMIN — PROCHLORPERAZINE MALEATE 10 MG: 10 TABLET, FILM COATED ORAL at 11:22

## 2018-08-30 RX ADMIN — ACETAMINOPHEN 650 MG: 325 TABLET ORAL at 20:22

## 2018-08-30 ASSESSMENT — PAIN SCALES - GENERAL
PAINLEVEL_OUTOF10: 0
PAINLEVEL_OUTOF10: 5
PAINLEVEL_OUTOF10: 0

## 2018-08-30 ASSESSMENT — PAIN DESCRIPTION - FREQUENCY: FREQUENCY: CONTINUOUS

## 2018-08-30 ASSESSMENT — PAIN DESCRIPTION - PAIN TYPE: TYPE: ACUTE PAIN

## 2018-08-30 ASSESSMENT — ACTIVITIES OF DAILY LIVING (ADL): EFFECT OF PAIN ON DAILY ACTIVITIES: COMFORT

## 2018-08-30 ASSESSMENT — PAIN DESCRIPTION - ORIENTATION: ORIENTATION: MID

## 2018-08-30 ASSESSMENT — PAIN DESCRIPTION - LOCATION: LOCATION: ABDOMEN

## 2018-08-30 ASSESSMENT — PAIN DESCRIPTION - PROGRESSION: CLINICAL_PROGRESSION: GRADUALLY WORSENING

## 2018-08-30 ASSESSMENT — PAIN DESCRIPTION - DESCRIPTORS: DESCRIPTORS: ACHING;DISCOMFORT

## 2018-08-30 ASSESSMENT — PAIN DESCRIPTION - ONSET: ONSET: ON-GOING

## 2018-08-30 NOTE — PLAN OF CARE
Problem: Nutrition  Goal: Optimal nutrition therapy  Outcome: Ongoing  Nutrition Problem: Inadequate oral intake  Intervention: Food and/or Nutrient Delivery: Continue current diet, Start ONS  Nutritional Goals: Pt will consume and tolerate at least 50% of meals/supplements offered

## 2018-08-30 NOTE — CONSULTS
Psychiatry Consult received, chart reviewed including Dr. Sylvester Res note. Recommend changing Wellbutrin to XL 150mg qAM and continue to work with Dr. Hallie Contreras.
15 mL, 15 mL, Swish & Spit, Q4H PRN  sodium chloride flush 0.9 % injection 10 mL, 10 mL, Intravenous, 2 times per day  sodium chloride flush 0.9 % injection 10 mL, 10 mL, Intravenous, PRN  0.9 % sodium chloride infusion, , Intravenous, Continuous  sucralfate (CARAFATE) 1 GM/10ML suspension 1 g, 1 g, Oral, 4 times per day  acetaminophen (TYLENOL) tablet 650 mg, 650 mg, Oral, Q4H PRN  buPROPion (WELLBUTRIN) tablet 100 mg, 100 mg, Oral, Daily  famotidine (PEPCID) tablet 20 mg, 20 mg, Oral, BID  prochlorperazine (COMPAZINE) tablet 10 mg, 10 mg, Oral, Q6H PRN  Allergies:  Clonazepam; Other; and Sulfa antibiotics    Social History:    TOBACCO:   reports that he has never smoked. He has never used smokeless tobacco.  ETOH:   reports that he does not drink alcohol. DRUGS:   reports that he does not use drugs. Family History:   Family History   Problem Relation Age of Onset    Dementia Father      REVIEW OF SYSTEMS:    A comprehensive 12 point review of systems is negative except as documented above. PHYSICAL EXAM:      Vitals:    BP (!) 131/92   Pulse 76   Temp 96.8 °F (36 °C) (Temporal)   Resp 16   SpO2 100%     General: No acute distress  HEENT: PERRL, EOMI, oral mucosa moist and intact, no sclericterus  Neck: no thyromegaly  Lymphatic: no cervical or supraclavicular lymphadenopathy  Heart: no m/r/g; +s1/s2 rrr  Lungs: CTA bilaterally  Abdomen: soft, nd, mild ttp in epi area  Extremities: 2+pulses, no edema  Skin: no rashes or lesions  Neuro: a&o x 3; no gross deficit  DATA:    Recent Labs      08/29/18   1030   WBC  6.8   HGB  12.0*   HCT  34.9*   MCV  85.0   PLT  108*     Recent Labs      08/29/18   1030   NA  130*   K  3.8   CL  92*   CO2  28   PHOS  3.6   BUN  9   CREATININE  <0.5*     Recent Labs      08/29/18   1030   AST  16   ALT  12   BILIDIR  <0.2   BILITOT  0.5   ALKPHOS  24*     No results for input(s): LIPASE, AMYLASE in the last 72 hours.   Recent Labs      08/29/18   1030   PROT  5.6*   INR

## 2018-08-30 NOTE — PROGRESS NOTES
enlarged right-sided level 2 lymph node, nonspecific. Active  lymphoma not excluded. CT CHEST:  1. Small pulmonary nodules measuring 3-4 mm. These are nonspecific. Follow-up is recommended according to oncology protocol. 2. There is a focal area of mild narrowing in the distal aortic  arch/proximal descending aorta with poststenotic dilatation, measurements  provided above. 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 Consider vascular surgical  consultation, clinically appropriate. CT ABDOMEN/PELVIS:  1. Marked lymphadenopathy in the abdomen pelvis as described above,  consistent with active lymphoma. 2. Marked splenomegaly, consistent with active lymphoma.     Pathology:  1.  Right Axilla Lymph Node Biopsy (5/7/18):    2.  BM bx/asp (5/8/18): IHC:       Cytogenetic:  No growth in culture   FISH:  WNL    EGD biopsies 8/30/18:  Pending    PROBLEM LIST:         1.  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 Depression / Anxiety  3.  GERD  4.  HTN  5. Neutropenic Fever (7/2/18)         TREATMENT:         1.  CHOEP   Cycle #1 - 6/22/18  Cycle #2 - 7/16/18     ASSESSMENT AND PLAN:          1. Refractory PTCL, Stage IVb w/ BM involvement:   - CHOEP x 2 cycles w/ persistent lymphadenopathy on CT scan (8/22/18). Will ask radiology to compare recent CT scans from 8/22/18 to original PET scan in May 2018  -  Now w/ anorexia, abd discomfort and declining performance status concerning for refractory disease   -EGD performed:  Biopsies pending 8/29/18 (duodenum, stomach, GE junction)    2. ID: Patient is currently afebrile  - Not on abx     3. Heme: Anemia from previous chemotherapy   - Transfuse for Hgb < 7 and Platelets < 28 K.   - No transfusion today     4. Metabolic: Stable renal fxn and e-lytes WNL except for hypoMag  - Continue NS @ 50 mL/hr  - Replace Mg+ and K+ per protocol     5.  Nutrition / GI: Increased abd pain and anorexia, may be from refractory disease  - Consult GI, Dr. Dorothye Blizzard

## 2018-08-31 LAB
ALBUMIN SERPL-MCNC: 3.2 G/DL (ref 3.4–5)
ALP BLD-CCNC: 23 U/L (ref 40–129)
ALT SERPL-CCNC: 11 U/L (ref 10–40)
ANION GAP SERPL CALCULATED.3IONS-SCNC: 8 MMOL/L (ref 3–16)
AST SERPL-CCNC: 16 U/L (ref 15–37)
BANDED NEUTROPHILS RELATIVE PERCENT: 1 % (ref 0–7)
BASOPHILS ABSOLUTE: 0 K/UL (ref 0–0.2)
BASOPHILS RELATIVE PERCENT: 0 %
BILIRUB SERPL-MCNC: 0.3 MG/DL (ref 0–1)
BILIRUBIN DIRECT: <0.2 MG/DL (ref 0–0.3)
BILIRUBIN, INDIRECT: ABNORMAL MG/DL (ref 0–1)
BUN BLDV-MCNC: 9 MG/DL (ref 7–20)
CALCIUM SERPL-MCNC: 8.5 MG/DL (ref 8.3–10.6)
CHLORIDE BLD-SCNC: 99 MMOL/L (ref 99–110)
CO2: 28 MMOL/L (ref 21–32)
CREAT SERPL-MCNC: <0.5 MG/DL (ref 0.9–1.3)
EOSINOPHILS ABSOLUTE: 0.1 K/UL (ref 0–0.6)
EOSINOPHILS RELATIVE PERCENT: 1 %
GFR AFRICAN AMERICAN: >60
GFR NON-AFRICAN AMERICAN: >60
GLUCOSE BLD-MCNC: 89 MG/DL (ref 70–99)
HCT VFR BLD CALC: 31.2 % (ref 40.5–52.5)
HEMOGLOBIN: 10.7 G/DL (ref 13.5–17.5)
LACTATE DEHYDROGENASE: 264 U/L (ref 100–190)
LYMPHOCYTES ABSOLUTE: 1.2 K/UL (ref 1–5.1)
LYMPHOCYTES RELATIVE PERCENT: 17 %
MCH RBC QN AUTO: 29.1 PG (ref 26–34)
MCHC RBC AUTO-ENTMCNC: 34.3 G/DL (ref 31–36)
MCV RBC AUTO: 84.7 FL (ref 80–100)
METAMYELOCYTES RELATIVE PERCENT: 2 %
MONOCYTES ABSOLUTE: 0.1 K/UL (ref 0–1.3)
MONOCYTES RELATIVE PERCENT: 1 %
NEUTROPHILS ABSOLUTE: 5.9 K/UL (ref 1.7–7.7)
NEUTROPHILS RELATIVE PERCENT: 78 %
PDW BLD-RTO: 17.2 % (ref 12.4–15.4)
PHOSPHORUS: 4 MG/DL (ref 2.5–4.9)
PLATELET # BLD: 93 K/UL (ref 135–450)
PMV BLD AUTO: 6.4 FL (ref 5–10.5)
POTASSIUM SERPL-SCNC: 4.3 MMOL/L (ref 3.5–5.1)
RBC # BLD: 3.68 M/UL (ref 4.2–5.9)
SODIUM BLD-SCNC: 135 MMOL/L (ref 136–145)
TOTAL PROTEIN: 5 G/DL (ref 6.4–8.2)
URIC ACID, SERUM: 1.6 MG/DL (ref 3.5–7.2)
VRE CULTURE: NORMAL
WBC # BLD: 7.3 K/UL (ref 4–11)

## 2018-08-31 PROCEDURE — 6360000002 HC RX W HCPCS: Performed by: NURSE PRACTITIONER

## 2018-08-31 PROCEDURE — 6370000000 HC RX 637 (ALT 250 FOR IP): Performed by: INTERNAL MEDICINE

## 2018-08-31 PROCEDURE — 94150 VITAL CAPACITY TEST: CPT

## 2018-08-31 PROCEDURE — 6360000002 HC RX W HCPCS: Performed by: INTERNAL MEDICINE

## 2018-08-31 PROCEDURE — 3E03305 INTRODUCTION OF OTHER ANTINEOPLASTIC INTO PERIPHERAL VEIN, PERCUTANEOUS APPROACH: ICD-10-PCS | Performed by: INTERNAL MEDICINE

## 2018-08-31 PROCEDURE — 94664 DEMO&/EVAL PT USE INHALER: CPT

## 2018-08-31 PROCEDURE — 36591 DRAW BLOOD OFF VENOUS DEVICE: CPT

## 2018-08-31 PROCEDURE — 80076 HEPATIC FUNCTION PANEL: CPT

## 2018-08-31 PROCEDURE — 2060000000 HC ICU INTERMEDIATE R&B

## 2018-08-31 PROCEDURE — 84100 ASSAY OF PHOSPHORUS: CPT

## 2018-08-31 PROCEDURE — 85025 COMPLETE CBC W/AUTO DIFF WBC: CPT

## 2018-08-31 PROCEDURE — 6370000000 HC RX 637 (ALT 250 FOR IP): Performed by: PSYCHIATRY & NEUROLOGY

## 2018-08-31 PROCEDURE — 80048 BASIC METABOLIC PNL TOTAL CA: CPT

## 2018-08-31 PROCEDURE — 96417 CHEMO IV INFUS EACH ADDL SEQ: CPT

## 2018-08-31 PROCEDURE — 84550 ASSAY OF BLOOD/URIC ACID: CPT

## 2018-08-31 PROCEDURE — 2580000003 HC RX 258: Performed by: NURSE PRACTITIONER

## 2018-08-31 PROCEDURE — 83615 LACTATE (LD) (LDH) ENZYME: CPT

## 2018-08-31 PROCEDURE — 96415 CHEMO IV INFUSION ADDL HR: CPT

## 2018-08-31 PROCEDURE — 2580000003 HC RX 258: Performed by: INTERNAL MEDICINE

## 2018-08-31 PROCEDURE — 6370000000 HC RX 637 (ALT 250 FOR IP): Performed by: NURSE PRACTITIONER

## 2018-08-31 PROCEDURE — 94760 N-INVAS EAR/PLS OXIMETRY 1: CPT

## 2018-08-31 RX ORDER — ONDANSETRON HYDROCHLORIDE 8 MG/1
24 TABLET, FILM COATED ORAL ONCE
Status: COMPLETED | OUTPATIENT
Start: 2018-09-01 | End: 2018-09-01

## 2018-08-31 RX ORDER — SODIUM CHLORIDE 9 MG/ML
150 INJECTION, SOLUTION INTRAVENOUS CONTINUOUS
Status: DISCONTINUED | OUTPATIENT
Start: 2018-08-31 | End: 2018-09-04 | Stop reason: ALTCHOICE

## 2018-08-31 RX ORDER — ONDANSETRON HYDROCHLORIDE 8 MG/1
24 TABLET, FILM COATED ORAL ONCE
Status: COMPLETED | OUTPATIENT
Start: 2018-08-31 | End: 2018-08-31

## 2018-08-31 RX ORDER — METOCLOPRAMIDE HYDROCHLORIDE 5 MG/ML
5 INJECTION INTRAMUSCULAR; INTRAVENOUS
Status: DISCONTINUED | OUTPATIENT
Start: 2018-08-31 | End: 2018-09-04 | Stop reason: HOSPADM

## 2018-08-31 RX ORDER — DEXAMETHASONE 4 MG/1
40 TABLET ORAL ONCE
Status: COMPLETED | OUTPATIENT
Start: 2018-09-01 | End: 2018-09-01

## 2018-08-31 RX ORDER — PANTOPRAZOLE SODIUM 40 MG/1
40 TABLET, DELAYED RELEASE ORAL DAILY
Status: DISCONTINUED | OUTPATIENT
Start: 2018-09-01 | End: 2018-09-04 | Stop reason: HOSPADM

## 2018-08-31 RX ORDER — DEXAMETHASONE SODIUM PHOSPHATE 1 MG/ML
2 SOLUTION/ DROPS OPHTHALMIC EVERY 6 HOURS SCHEDULED
Status: DISCONTINUED | OUTPATIENT
Start: 2018-09-01 | End: 2018-09-04 | Stop reason: HOSPADM

## 2018-08-31 RX ORDER — SODIUM CHLORIDE 9 MG/ML
333 INJECTION, SOLUTION INTRAVENOUS CONTINUOUS
Status: DISPENSED | OUTPATIENT
Start: 2018-08-31 | End: 2018-08-31

## 2018-08-31 RX ORDER — ONDANSETRON HYDROCHLORIDE 8 MG/1
8 TABLET, FILM COATED ORAL EVERY 8 HOURS PRN
Status: DISCONTINUED | OUTPATIENT
Start: 2018-08-31 | End: 2018-09-04 | Stop reason: HOSPADM

## 2018-08-31 RX ORDER — DEXAMETHASONE 4 MG/1
40 TABLET ORAL EVERY 24 HOURS
Status: COMPLETED | OUTPATIENT
Start: 2018-09-02 | End: 2018-09-03

## 2018-08-31 RX ORDER — DEXAMETHASONE 4 MG/1
40 TABLET ORAL ONCE
Status: COMPLETED | OUTPATIENT
Start: 2018-08-31 | End: 2018-08-31

## 2018-08-31 RX ORDER — ALLOPURINOL 300 MG/1
300 TABLET ORAL DAILY
Status: DISCONTINUED | OUTPATIENT
Start: 2018-08-31 | End: 2018-09-04 | Stop reason: HOSPADM

## 2018-08-31 RX ORDER — ONDANSETRON 2 MG/ML
8 INJECTION INTRAMUSCULAR; INTRAVENOUS EVERY 8 HOURS PRN
Status: DISCONTINUED | OUTPATIENT
Start: 2018-08-31 | End: 2018-09-04 | Stop reason: HOSPADM

## 2018-08-31 RX ORDER — FUROSEMIDE 10 MG/ML
40 INJECTION INTRAMUSCULAR; INTRAVENOUS EVERY 12 HOURS
Status: DISCONTINUED | OUTPATIENT
Start: 2018-09-01 | End: 2018-09-04 | Stop reason: HOSPADM

## 2018-08-31 RX ADMIN — METOCLOPRAMIDE 5 MG: 5 INJECTION, SOLUTION INTRAMUSCULAR; INTRAVENOUS at 21:25

## 2018-08-31 RX ADMIN — ONDANSETRON HYDROCHLORIDE 24 MG: 8 TABLET, FILM COATED ORAL at 16:07

## 2018-08-31 RX ADMIN — ALLOPURINOL 300 MG: 300 TABLET ORAL at 12:15

## 2018-08-31 RX ADMIN — SODIUM CHLORIDE 150 ML/HR: 9 INJECTION, SOLUTION INTRAVENOUS at 23:55

## 2018-08-31 RX ADMIN — SUCRALFATE 1 G: 1 SUSPENSION ORAL at 17:26

## 2018-08-31 RX ADMIN — SODIUM CHLORIDE 150 ML/HR: 9 INJECTION, SOLUTION INTRAVENOUS at 16:17

## 2018-08-31 RX ADMIN — PANTOPRAZOLE SODIUM 40 MG: 40 TABLET, DELAYED RELEASE ORAL at 08:27

## 2018-08-31 RX ADMIN — PROCHLORPERAZINE MALEATE 10 MG: 10 TABLET, FILM COATED ORAL at 10:58

## 2018-08-31 RX ADMIN — BUPROPION HYDROCHLORIDE 150 MG: 150 TABLET, FILM COATED, EXTENDED RELEASE ORAL at 09:31

## 2018-08-31 RX ADMIN — ENOXAPARIN SODIUM 40 MG: 40 INJECTION SUBCUTANEOUS at 08:27

## 2018-08-31 RX ADMIN — SUCRALFATE 1 G: 1 SUSPENSION ORAL at 23:54

## 2018-08-31 RX ADMIN — SODIUM CHLORIDE 150 MG: 900 INJECTION, SOLUTION INTRAVENOUS at 16:17

## 2018-08-31 RX ADMIN — Medication 10 ML: at 21:26

## 2018-08-31 RX ADMIN — SUCRALFATE 1 G: 1 SUSPENSION ORAL at 12:15

## 2018-08-31 RX ADMIN — DEXAMETHASONE 40 MG: 4 TABLET ORAL at 16:07

## 2018-08-31 RX ADMIN — PANTOPRAZOLE SODIUM 40 MG: 40 TABLET, DELAYED RELEASE ORAL at 16:18

## 2018-08-31 RX ADMIN — METOCLOPRAMIDE 5 MG: 5 INJECTION, SOLUTION INTRAMUSCULAR; INTRAVENOUS at 16:06

## 2018-08-31 RX ADMIN — SODIUM CHLORIDE 333 ML/HR: 9 INJECTION, SOLUTION INTRAVENOUS at 13:03

## 2018-08-31 RX ADMIN — FAMOTIDINE 20 MG: 20 TABLET ORAL at 09:31

## 2018-08-31 RX ADMIN — CISPLATIN 207 MG: 100 INJECTION, SOLUTION INTRAVENOUS at 17:19

## 2018-08-31 ASSESSMENT — PAIN SCALES - GENERAL
PAINLEVEL_OUTOF10: 0

## 2018-08-31 NOTE — FLOWSHEET NOTE
08/31/18 1506   Encounter Summary   Services provided to: Patient   Volunteer Visit (fr mejia 8/31)   Sacraments   Communion Patient received communion   Reconciliation yes, confession  (confession)

## 2018-08-31 NOTE — PROGRESS NOTES
[address] [facility]    2018     Triny Raygoza    MRN: [id number]    : 1960    Referring MD: Kelvin Ashley  [facility] [place]      SUBJECTIVE:  ***    ECOG PS:  (2) Ambulatory and capable of self care, unable to carry out work activity, up and about > 50% or waking hours    Isolation: None    Medications    Scheduled Meds:   metoclopramide  5 mg Intravenous 4x Daily AC & HS    pantoprazole  40 mg Oral BID AC    buPROPion  150 mg Oral Daily    enoxaparin  40 mg Subcutaneous Daily    Saline Mouthwash  15 mL Swish & Spit 4x Daily AC & HS    sodium chloride flush  10 mL Intravenous 2 times per day    sucralfate  1 g Oral 4 times per day    famotidine  20 mg Oral BID     Continuous Infusions:   sodium chloride      sodium chloride 50 mL/hr at 18 2019     PRN Meds:.sodium chloride, alteplase, magnesium hydroxide, magnesium sulfate, potassium chloride, Saline Mouthwash, sodium chloride flush, acetaminophen, prochlorperazine    ROS  · Constitutional: Denies fever, sweats, weight loss. · Eyes: No visual changes or diplopia. No scleral icterus. · ENT: No Headaches, hearing loss or vertigo. No mouth sores or sore throat. · Cardiovascular: No chest pain, dyspnea on exertion, palpitations or loss of consciousness. · Respiratory: No cough or wheezing, no sputum production. No hemoptysis. · Gastrointestinal: No abdominal pain, appetite loss, blood in stools. No change in bowel habits. · Genitourinary: No dysuria, trouble voiding, or hematuria. · Musculoskeletal:  No generalized weakness. No joint complaints. · Integumentary: No rash or pruritis. · Neurological: No headache, diplopia. No change in gait, balance, or coordination. No paresthesias. · Endocrine: No temperature intolerance. No excessive thirst, fluid intake, or urination.    · Hematologic/Lymphatic: No abnormal bruising or ecchymoses, blood clots or swollen lymph superficial squamous mucosa with small volume of  attached soft tissue, without diagnostic alterations (soft tissue  congestion and recent hemorrhage are noted, possibly related to the  biopsy process). A-C: at each of the biopsied sites, abnormal infiltrates, significant  architectural alterations, viral cytopathy, granulomas, dysplasia, and  malignancy are absent, with special attention paid to the 'Clinical  history' (see below).   BRATI/BRATI    PROBLEM LIST:           1.  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 Depression / Anxiety  3.  GERD  4.  HTN  5. Neutropenic Fever (7/2/18)      TREATMENT:           1.  CHOEP   Cycle #1 - 6/22/18  Cycle #2 - 7/16/18   2. DHAP  Cycle #1 - 8/31/18    ASSESSMENT AND PLAN:          1. Refractory PTCL, Stage IVb w/ BM involvement:   - CHOEP x 2 cycles w/ persistent lymphadenopathy on CT scan (8/22/18). Will ask radiology to compare recent CT scans from 8/22/18 to original PET scan in May 2018  -  Now w/ anorexia, abd discomfort and declining performance status concerning for refractory disease   - EGD Dorna Herrera, 8/29/18) -  Negative, no diagnostic alterations   - Refractory disease after 2 cycles of CHOEP  - Start DHAP (8/31/18)    Cycle 1, day + 1    2. ID: Patient is currently afebrile  - Not on abx     3. Heme: Anemia from previous chemotherapy   - Transfuse for Hgb < 7 and Platelets < 36 K.   - No transfusion today     4. Metabolic: Stable renal fxn and e-lytes WNL except for hypoNa  - Continue NS @ 50 mL/hr  - Replace Mg+ and K+ per protocol     5. Moderate malnutrition / GI: Increased abd pain and anorexia, may be from refractory disease  - EGD [place] Herrera, 8/29/18) -  Negative, no diagnostic alterations, but stomach had significant erythema and edema. Retroflexion did show a hiatal hernia. - Cont Pepcid bid  - Cont Carafate qid  - Cont PPI bid (started 8/30/18)  - Cont Compazine as needed for nausea  - Cont low microbial diet  - Dietary to follow     7. Depression / Anxiety: Very anxious and depressed  - Cont Wellbutrin daily    - Psych to follow        - DVT Prophylaxis: Platelets >08,166 cells/dL, - daily lovenox prophylaxis ordered  Contraindications to pharmacologic prophylaxis: None  Contraindications to mechanical prophylaxis: None    - Disposition: Unknown at this time      Megha Sanz APRN - CNP

## 2018-08-31 NOTE — PLAN OF CARE
Problem: Pain:  Goal: Pain level will decrease  Pain level will decrease   Outcome: Ongoing  Pt c/o abdominal pain this shift. Pt rated pain a 5 out of 10. Tylenol given x1 per PRN order and per pt request. Pt resting quietly with RR >10 upon pain reassessment. Will continue to assess for pain and monitor. Problem: Falls - Risk of:  Goal: Will remain free from falls  Will remain free from falls   Outcome: Ongoing  High Fall Risk per CASTORENA/ABCDS: Explained fall risk precautions to pt and family and rationale behind their use to keep the patient safe. Pt bed is in low position, side rails up, call light and belongings are in reach. Fall wristband applied and present on pts wrist. Bed alarn on. Pt encouraged to call for assistance. Will continue with hourly rounds for PO intake, pain needs, toileting and repositioning as needed. Problem: Bleeding:  Goal: Will show no signs and symptoms of excessive bleeding  Will show no signs and symptoms of excessive bleeding   Outcome: Ongoing  Patient's hemoglobin this AM:   Recent Labs      08/31/18   0400   HGB  10.7*     Patient's platelet count this AM:   Recent Labs      08/31/18   0400   PLT  93*     Patient showing no signs or symptoms of active bleeding. Transfusion is not indicated at this time. Patient verbalizes understanding of all instructions. Will continue to assess and implement POC. Call light within reach and hourly rounding in place. Problem: PROTECTIVE PRECAUTIONS  Goal: Patient will remain free of nosocomial Infections  Outcome: Ongoing  Protective precautions in place. Pt is not neutropenic at this time. Pt afebrile this shift, VSS. Will continue to monitor. Compliant with BCC Bath Protocol:  Performed CHG bath on previous shift per [facility] protocol utilizing CHG solution in the shower. Continued to encourage daily CHG bathing per [facility] protocol.     Problem: Infection - Central Venous Catheter-Associated Bloodstream Infection:  Goal: Will show no

## 2018-08-31 NOTE — PLAN OF CARE
d/t decreased mobility and cancer treatment. Pt educated on importance of activity. Pt has orders for Subcut prophylactic lovenox. Pt verbalizes understanding of need for prophylaxis while inpatient.

## 2018-08-31 NOTE — PROGRESS NOTES
tenderness/mass/nodules  CVS:  RRR, Nl s1s2  Lungs CTA Bilaterally, normal effort  Abdomen: soft, non-tender; bowel sounds normal; no masses,  no organomegaly  AAOx3, No asterixis or encephalopathy  Extremities: No edema. Recent Labs      08/29/18   1030  08/30/18   0405  08/31/18   0400   WBC  6.8  6.2  7.3   HGB  12.0*  10.7*  10.7*   HCT  34.9*  31.2*  31.2*   MCV  85.0  85.2  84.7   PLT  108*  95*  93*     Recent Labs      08/29/18   1030  08/30/18   0405  08/31/18   0400  08/31/18   0437   NA  130*  135*  135*   --    K  3.8  4.1  4.3   --    CL  92*  98*  99   --    CO2  28  28  28   --    PHOS  3.6  3.6   --   4.0   BUN  9  9  9   --    CREATININE  <0.5*  <0.5*  <0.5*   --      Recent Labs      08/29/18   1030  08/30/18   0405  08/31/18   0400   AST  16  15  16   ALT  12  11  11   BILIDIR  <0.2  <0.2  <0.2   BILITOT  0.5  0.4  0.3   ALKPHOS  24*  22*  23*     No results for input(s): LIPASE, AMYLASE in the last 72 hours. Recent Labs      08/29/18   1030  08/30/18   0405  08/31/18   0400   PROT  5.6*  5.0*  5.0*   INR  1.38*  1.33*   --      No results for input(s): PTT in the last 72 hours. No results for input(s): OCCULTBLD in the last 72 hours. Radiology review: CT 8/22    Assessment:       Nausea  lymphoma    Recommendations:       Biopsies normal; structurally normal. Question from lymphoma or perhaps motility disorder. Trial of reglan today has worked great. Would continue for now; discussed side effect profile with patient and family. Will decrease PPI to daily  Call back with questions or concerns.    Virl Stage  8/31/2018  7:40 AM

## 2018-08-31 NOTE — CARE COORDINATION
8/31: Present during rounds as Dr Teressa Mcdaniel explained that his lymphoma is active and we must change therapy. Will start DHAP today. Notified [facility] that he may need Neulasta OBI on discharge if he goes home on Monday. Requested appt for same. Will need HFU appt with IVF's at [facility].

## 2018-09-01 LAB
ALBUMIN SERPL-MCNC: 3.2 G/DL (ref 3.4–5)
ALP BLD-CCNC: 23 U/L (ref 40–129)
ALT SERPL-CCNC: 14 U/L (ref 10–40)
ANION GAP SERPL CALCULATED.3IONS-SCNC: 10 MMOL/L (ref 3–16)
AST SERPL-CCNC: 18 U/L (ref 15–37)
BASOPHILS ABSOLUTE: 0 K/UL (ref 0–0.2)
BASOPHILS RELATIVE PERCENT: 0 %
BILIRUB SERPL-MCNC: 0.4 MG/DL (ref 0–1)
BILIRUBIN DIRECT: <0.2 MG/DL (ref 0–0.3)
BILIRUBIN, INDIRECT: ABNORMAL MG/DL (ref 0–1)
BUN BLDV-MCNC: 13 MG/DL (ref 7–20)
CALCIUM SERPL-MCNC: 8.6 MG/DL (ref 8.3–10.6)
CHLORIDE BLD-SCNC: 98 MMOL/L (ref 99–110)
CO2: 24 MMOL/L (ref 21–32)
CREAT SERPL-MCNC: <0.5 MG/DL (ref 0.9–1.3)
EOSINOPHILS ABSOLUTE: 0 K/UL (ref 0–0.6)
EOSINOPHILS RELATIVE PERCENT: 0 %
GFR AFRICAN AMERICAN: >60
GFR NON-AFRICAN AMERICAN: >60
GLUCOSE BLD-MCNC: 144 MG/DL (ref 70–99)
HCT VFR BLD CALC: 29.8 % (ref 40.5–52.5)
HEMOGLOBIN: 10.4 G/DL (ref 13.5–17.5)
LACTATE DEHYDROGENASE: 285 U/L (ref 100–190)
LYMPHOCYTES ABSOLUTE: 0.9 K/UL (ref 1–5.1)
LYMPHOCYTES RELATIVE PERCENT: 15 %
MCH RBC QN AUTO: 29.5 PG (ref 26–34)
MCHC RBC AUTO-ENTMCNC: 35 G/DL (ref 31–36)
MCV RBC AUTO: 84.4 FL (ref 80–100)
MONOCYTES ABSOLUTE: 0.1 K/UL (ref 0–1.3)
MONOCYTES RELATIVE PERCENT: 2 %
NEUTROPHILS ABSOLUTE: 5 K/UL (ref 1.7–7.7)
NEUTROPHILS RELATIVE PERCENT: 83 %
PDW BLD-RTO: 16.8 % (ref 12.4–15.4)
PHOSPHORUS: 3.5 MG/DL (ref 2.5–4.9)
PLATELET # BLD: 88 K/UL (ref 135–450)
PMV BLD AUTO: 6.7 FL (ref 5–10.5)
POTASSIUM SERPL-SCNC: 4.1 MMOL/L (ref 3.5–5.1)
RBC # BLD: 3.53 M/UL (ref 4.2–5.9)
SODIUM BLD-SCNC: 132 MMOL/L (ref 136–145)
TOTAL PROTEIN: 5.2 G/DL (ref 6.4–8.2)
URIC ACID, SERUM: 1.7 MG/DL (ref 3.5–7.2)
WBC # BLD: 6 K/UL (ref 4–11)

## 2018-09-01 PROCEDURE — 2580000003 HC RX 258: Performed by: INTERNAL MEDICINE

## 2018-09-01 PROCEDURE — 83615 LACTATE (LD) (LDH) ENZYME: CPT

## 2018-09-01 PROCEDURE — 6370000000 HC RX 637 (ALT 250 FOR IP): Performed by: INTERNAL MEDICINE

## 2018-09-01 PROCEDURE — 84100 ASSAY OF PHOSPHORUS: CPT

## 2018-09-01 PROCEDURE — 96415 CHEMO IV INFUSION ADDL HR: CPT

## 2018-09-01 PROCEDURE — 85025 COMPLETE CBC W/AUTO DIFF WBC: CPT

## 2018-09-01 PROCEDURE — 84550 ASSAY OF BLOOD/URIC ACID: CPT

## 2018-09-01 PROCEDURE — 80076 HEPATIC FUNCTION PANEL: CPT

## 2018-09-01 PROCEDURE — 2580000003 HC RX 258: Performed by: NURSE PRACTITIONER

## 2018-09-01 PROCEDURE — 96413 CHEMO IV INFUSION 1 HR: CPT

## 2018-09-01 PROCEDURE — 6370000000 HC RX 637 (ALT 250 FOR IP): Performed by: PSYCHIATRY & NEUROLOGY

## 2018-09-01 PROCEDURE — 6360000002 HC RX W HCPCS: Performed by: INTERNAL MEDICINE

## 2018-09-01 PROCEDURE — 80048 BASIC METABOLIC PNL TOTAL CA: CPT

## 2018-09-01 PROCEDURE — 2060000000 HC ICU INTERMEDIATE R&B

## 2018-09-01 PROCEDURE — 6360000002 HC RX W HCPCS: Performed by: NURSE PRACTITIONER

## 2018-09-01 PROCEDURE — 36591 DRAW BLOOD OFF VENOUS DEVICE: CPT

## 2018-09-01 PROCEDURE — 6370000000 HC RX 637 (ALT 250 FOR IP): Performed by: NURSE PRACTITIONER

## 2018-09-01 RX ADMIN — SUCRALFATE 1 G: 1 SUSPENSION ORAL at 19:30

## 2018-09-01 RX ADMIN — SUCRALFATE 1 G: 1 SUSPENSION ORAL at 07:08

## 2018-09-01 RX ADMIN — DEXAMETHASONE SODIUM PHOSPHATE 2 DROP: 1 SOLUTION/ DROPS OPHTHALMIC at 19:35

## 2018-09-01 RX ADMIN — CYTARABINE 4100 MG: 100 INJECTION, SOLUTION INTRATHECAL; INTRAVENOUS; SUBCUTANEOUS at 20:40

## 2018-09-01 RX ADMIN — SODIUM CHLORIDE 150 ML/HR: 9 INJECTION, SOLUTION INTRAVENOUS at 07:08

## 2018-09-01 RX ADMIN — METOCLOPRAMIDE 5 MG: 5 INJECTION, SOLUTION INTRAMUSCULAR; INTRAVENOUS at 20:35

## 2018-09-01 RX ADMIN — METOCLOPRAMIDE 5 MG: 5 INJECTION, SOLUTION INTRAMUSCULAR; INTRAVENOUS at 17:47

## 2018-09-01 RX ADMIN — ONDANSETRON HYDROCHLORIDE 24 MG: 8 TABLET, FILM COATED ORAL at 19:30

## 2018-09-01 RX ADMIN — ENOXAPARIN SODIUM 40 MG: 40 INJECTION SUBCUTANEOUS at 09:08

## 2018-09-01 RX ADMIN — SUCRALFATE 1 G: 1 SUSPENSION ORAL at 12:00

## 2018-09-01 RX ADMIN — ALLOPURINOL 300 MG: 300 TABLET ORAL at 09:25

## 2018-09-01 RX ADMIN — BUPROPION HYDROCHLORIDE 150 MG: 150 TABLET, FILM COATED, EXTENDED RELEASE ORAL at 09:25

## 2018-09-01 RX ADMIN — Medication 10 ML: at 20:30

## 2018-09-01 RX ADMIN — METOCLOPRAMIDE 5 MG: 5 INJECTION, SOLUTION INTRAMUSCULAR; INTRAVENOUS at 07:00

## 2018-09-01 RX ADMIN — DEXAMETHASONE 40 MG: 4 TABLET ORAL at 19:31

## 2018-09-01 RX ADMIN — SODIUM CHLORIDE 150 ML/HR: 9 INJECTION, SOLUTION INTRAVENOUS at 20:30

## 2018-09-01 RX ADMIN — METOCLOPRAMIDE 5 MG: 5 INJECTION, SOLUTION INTRAMUSCULAR; INTRAVENOUS at 12:00

## 2018-09-01 RX ADMIN — PANTOPRAZOLE SODIUM 40 MG: 40 TABLET, DELAYED RELEASE ORAL at 09:25

## 2018-09-01 ASSESSMENT — PAIN SCALES - GENERAL
PAINLEVEL_OUTOF10: 0

## 2018-09-01 NOTE — PROGRESS NOTES
biopsy location, the villi, where well-presented,  are tall, slender, and slightly scalloped, and stromal plasma cells are  plentiful. B: Sections contain gastric mucosa compatible with antrum/antrum to  fundus, without diagnostic alterations. Helicobacter organisms are not  apparent. C: Sections contain superficial squamous mucosa with small volume of  attached soft tissue, without diagnostic alterations (soft tissue  congestion and recent hemorrhage are noted, possibly related to the  biopsy process). A-C: at each of the biopsied sites, abnormal infiltrates, significant  architectural alterations, viral cytopathy, granulomas, dysplasia, and  malignancy are absent, with special attention paid to the 'Clinical  history' (see below).   BRATI/BRATI    PROBLEM LIST:           1.  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 Depression / Anxiety  3.  GERD  4.  HTN  5. Neutropenic Fever (7/2/18)      TREATMENT:           1.  CHOEP   Cycle #1 - 6/22/18  Cycle #2 - 7/16/18   2. DHAP  Cycle #1 - 8/31/18    ASSESSMENT AND PLAN:          1. Refractory PTCL, Stage IVb w/ BM involvement:   - CHOEP x 2 cycles w/ persistent lymphadenopathy on CT scan (8/22/18). Will ask radiology to compare recent CT scans from 8/22/18 to original PET scan in May 2018  -  Now w/ anorexia, abd discomfort and declining performance status concerning for refractory disease   - EGD Sho Brown, 8/29/18) -  Negative, no diagnostic alterations   - Refractory disease after 2 cycles of CHOEP  - Start DHAP (8/31/18)    Cycle 1, day + 2    2. ID: Patient is currently afebrile  - Not on abx     3. Heme: Anemia from previous chemotherapy   - Transfuse for Hgb < 7 and Platelets < 41 K.   - No transfusion today     4. Metabolic: Stable renal fxn and e-lytes WNL except for hypoNa  - Continue NS @ 50 mL/hr  - Replace Mg+ and K+ per protocol     5.  Moderate malnutrition / GI: Increased abd pain and anorexia, may be from refractory disease  - ANAND Brown, 8/29/18) -  Negative, no diagnostic alterations, but stomach had significant erythema and edema. Retroflexion did show a hiatal hernia. - Cont Pepcid bid  - Cont Carafate qid  - Cont PPI bid (started 8/30/18)  - Cont Compazine as needed for nausea  - Cont low microbial diet  - Dietary to follow     7.  Depression / Anxiety: Very anxious and depressed  - Cont Wellbutrin daily    - Psych to follow        - DVT Prophylaxis: Platelets >06,110 cells/dL, - daily lovenox prophylaxis ordered  Contraindications to pharmacologic prophylaxis: None  Contraindications to mechanical prophylaxis: None    - Disposition: Unknown at this time      Dru Whitehead MD

## 2018-09-02 LAB
ALBUMIN SERPL-MCNC: 3.5 G/DL (ref 3.4–5)
ALP BLD-CCNC: 23 U/L (ref 40–129)
ALT SERPL-CCNC: 25 U/L (ref 10–40)
ANION GAP SERPL CALCULATED.3IONS-SCNC: 12 MMOL/L (ref 3–16)
ANISOCYTOSIS: ABNORMAL
AST SERPL-CCNC: 28 U/L (ref 15–37)
BASOPHILS ABSOLUTE: 0 K/UL (ref 0–0.2)
BASOPHILS RELATIVE PERCENT: 0.4 %
BILIRUB SERPL-MCNC: 0.4 MG/DL (ref 0–1)
BILIRUBIN DIRECT: <0.2 MG/DL (ref 0–0.3)
BILIRUBIN, INDIRECT: ABNORMAL MG/DL (ref 0–1)
BUN BLDV-MCNC: 10 MG/DL (ref 7–20)
CALCIUM SERPL-MCNC: 8.8 MG/DL (ref 8.3–10.6)
CHLORIDE BLD-SCNC: 96 MMOL/L (ref 99–110)
CO2: 23 MMOL/L (ref 21–32)
CREAT SERPL-MCNC: <0.5 MG/DL (ref 0.9–1.3)
EOSINOPHILS ABSOLUTE: 0 K/UL (ref 0–0.6)
EOSINOPHILS RELATIVE PERCENT: 0 %
GFR AFRICAN AMERICAN: >60
GFR NON-AFRICAN AMERICAN: >60
GLUCOSE BLD-MCNC: 129 MG/DL (ref 70–99)
HCT VFR BLD CALC: 31.2 % (ref 40.5–52.5)
HEMOGLOBIN: 10.9 G/DL (ref 13.5–17.5)
LACTATE DEHYDROGENASE: 323 U/L (ref 100–190)
LYMPHOCYTES ABSOLUTE: 0.6 K/UL (ref 1–5.1)
LYMPHOCYTES RELATIVE PERCENT: 6.2 %
MCH RBC QN AUTO: 29.3 PG (ref 26–34)
MCHC RBC AUTO-ENTMCNC: 35 G/DL (ref 31–36)
MCV RBC AUTO: 83.8 FL (ref 80–100)
MONOCYTES ABSOLUTE: 0 K/UL (ref 0–1.3)
MONOCYTES RELATIVE PERCENT: 0.5 %
NEUTROPHILS ABSOLUTE: 8.5 K/UL (ref 1.7–7.7)
NEUTROPHILS RELATIVE PERCENT: 92.9 %
PDW BLD-RTO: 17.3 % (ref 12.4–15.4)
PHOSPHORUS: 4.3 MG/DL (ref 2.5–4.9)
PLATELET # BLD: 90 K/UL (ref 135–450)
PMV BLD AUTO: 6.5 FL (ref 5–10.5)
POTASSIUM SERPL-SCNC: 4.4 MMOL/L (ref 3.5–5.1)
RBC # BLD: 3.73 M/UL (ref 4.2–5.9)
SLIDE REVIEW: ABNORMAL
SODIUM BLD-SCNC: 131 MMOL/L (ref 136–145)
TOTAL PROTEIN: 5.5 G/DL (ref 6.4–8.2)
URIC ACID, SERUM: 1.5 MG/DL (ref 3.5–7.2)
WBC # BLD: 9.1 K/UL (ref 4–11)

## 2018-09-02 PROCEDURE — 6370000000 HC RX 637 (ALT 250 FOR IP): Performed by: INTERNAL MEDICINE

## 2018-09-02 PROCEDURE — 6360000002 HC RX W HCPCS: Performed by: INTERNAL MEDICINE

## 2018-09-02 PROCEDURE — 84550 ASSAY OF BLOOD/URIC ACID: CPT

## 2018-09-02 PROCEDURE — 80048 BASIC METABOLIC PNL TOTAL CA: CPT

## 2018-09-02 PROCEDURE — 84100 ASSAY OF PHOSPHORUS: CPT

## 2018-09-02 PROCEDURE — 83615 LACTATE (LD) (LDH) ENZYME: CPT

## 2018-09-02 PROCEDURE — 2060000000 HC ICU INTERMEDIATE R&B

## 2018-09-02 PROCEDURE — 6370000000 HC RX 637 (ALT 250 FOR IP): Performed by: PSYCHIATRY & NEUROLOGY

## 2018-09-02 PROCEDURE — 36591 DRAW BLOOD OFF VENOUS DEVICE: CPT

## 2018-09-02 PROCEDURE — 2580000003 HC RX 258: Performed by: INTERNAL MEDICINE

## 2018-09-02 PROCEDURE — 85025 COMPLETE CBC W/AUTO DIFF WBC: CPT

## 2018-09-02 PROCEDURE — 6370000000 HC RX 637 (ALT 250 FOR IP): Performed by: NURSE PRACTITIONER

## 2018-09-02 PROCEDURE — 6360000002 HC RX W HCPCS: Performed by: NURSE PRACTITIONER

## 2018-09-02 PROCEDURE — 80076 HEPATIC FUNCTION PANEL: CPT

## 2018-09-02 PROCEDURE — 2580000003 HC RX 258: Performed by: NURSE PRACTITIONER

## 2018-09-02 RX ADMIN — PANTOPRAZOLE SODIUM 40 MG: 40 TABLET, DELAYED RELEASE ORAL at 09:13

## 2018-09-02 RX ADMIN — SUCRALFATE 1 G: 1 SUSPENSION ORAL at 23:51

## 2018-09-02 RX ADMIN — SODIUM CHLORIDE 150 ML/HR: 9 INJECTION, SOLUTION INTRAVENOUS at 14:43

## 2018-09-02 RX ADMIN — METOCLOPRAMIDE 5 MG: 5 INJECTION, SOLUTION INTRAMUSCULAR; INTRAVENOUS at 06:38

## 2018-09-02 RX ADMIN — METOCLOPRAMIDE 5 MG: 5 INJECTION, SOLUTION INTRAMUSCULAR; INTRAVENOUS at 12:33

## 2018-09-02 RX ADMIN — ACETAMINOPHEN 650 MG: 325 TABLET ORAL at 05:51

## 2018-09-02 RX ADMIN — SODIUM CHLORIDE 150 ML/HR: 9 INJECTION, SOLUTION INTRAVENOUS at 06:38

## 2018-09-02 RX ADMIN — SUCRALFATE 1 G: 1 SUSPENSION ORAL at 05:51

## 2018-09-02 RX ADMIN — ENOXAPARIN SODIUM 40 MG: 40 INJECTION SUBCUTANEOUS at 09:12

## 2018-09-02 RX ADMIN — SODIUM CHLORIDE 150 ML/HR: 9 INJECTION, SOLUTION INTRAVENOUS at 21:38

## 2018-09-02 RX ADMIN — DEXAMETHASONE SODIUM PHOSPHATE 2 DROP: 1 SOLUTION/ DROPS OPHTHALMIC at 17:00

## 2018-09-02 RX ADMIN — ACETAMINOPHEN 650 MG: 325 TABLET ORAL at 19:05

## 2018-09-02 RX ADMIN — DEXAMETHASONE SODIUM PHOSPHATE 2 DROP: 1 SOLUTION/ DROPS OPHTHALMIC at 05:51

## 2018-09-02 RX ADMIN — CYTARABINE 4100 MG: 100 INJECTION, SOLUTION INTRATHECAL; INTRAVENOUS; SUBCUTANEOUS at 09:11

## 2018-09-02 RX ADMIN — DEXAMETHASONE SODIUM PHOSPHATE 2 DROP: 1 SOLUTION/ DROPS OPHTHALMIC at 00:00

## 2018-09-02 RX ADMIN — DEXAMETHASONE 40 MG: 4 TABLET ORAL at 09:13

## 2018-09-02 RX ADMIN — SUCRALFATE 1 G: 1 SUSPENSION ORAL at 00:00

## 2018-09-02 RX ADMIN — METOCLOPRAMIDE 5 MG: 5 INJECTION, SOLUTION INTRAMUSCULAR; INTRAVENOUS at 21:16

## 2018-09-02 RX ADMIN — ALLOPURINOL 300 MG: 300 TABLET ORAL at 09:13

## 2018-09-02 RX ADMIN — Medication 10 ML: at 09:12

## 2018-09-02 RX ADMIN — BUPROPION HYDROCHLORIDE 150 MG: 150 TABLET, FILM COATED, EXTENDED RELEASE ORAL at 09:13

## 2018-09-02 RX ADMIN — Medication 10 ML: at 21:16

## 2018-09-02 RX ADMIN — DEXAMETHASONE SODIUM PHOSPHATE 2 DROP: 1 SOLUTION/ DROPS OPHTHALMIC at 23:51

## 2018-09-02 RX ADMIN — METOCLOPRAMIDE 5 MG: 5 INJECTION, SOLUTION INTRAMUSCULAR; INTRAVENOUS at 16:58

## 2018-09-02 RX ADMIN — SUCRALFATE 1 G: 1 SUSPENSION ORAL at 16:59

## 2018-09-02 RX ADMIN — DEXAMETHASONE SODIUM PHOSPHATE 2 DROP: 1 SOLUTION/ DROPS OPHTHALMIC at 12:34

## 2018-09-02 RX ADMIN — SODIUM CHLORIDE 15 ML: 900 IRRIGANT IRRIGATION at 21:16

## 2018-09-02 RX ADMIN — SUCRALFATE 1 G: 1 SUSPENSION ORAL at 12:33

## 2018-09-02 RX ADMIN — ACETAMINOPHEN 650 MG: 325 TABLET ORAL at 14:44

## 2018-09-02 ASSESSMENT — PAIN SCALES - GENERAL
PAINLEVEL_OUTOF10: 6
PAINLEVEL_OUTOF10: 0
PAINLEVEL_OUTOF10: 8
PAINLEVEL_OUTOF10: 0

## 2018-09-02 NOTE — PROGRESS NOTES
[address] Progress Note    2018     General Captain    MRN: [id number]    : 1960    Referring MD: Marilee Faye  [facility] [place]      SUBJECTIVE:  Very anxious. anthony chemtox well. ECOG PS:  (2) Ambulatory and capable of self care, unable to carry out work activity, up and about > 50% or waking hours    Isolation: None    Medications    Scheduled Meds:   metoclopramide  5 mg Intravenous 4x Daily AC & HS    allopurinol  300 mg Oral Daily    furosemide  40 mg Intravenous Q12H    dexamethasone  2 drop Both Eyes 4 times per day    cytarabine (CYTOSAR) chemo IVPB  4,100 mg Intravenous Q12H    dexamethasone  40 mg Oral Q24H    pantoprazole  40 mg Oral Daily    buPROPion  150 mg Oral Daily    enoxaparin  40 mg Subcutaneous Daily    Saline Mouthwash  15 mL Swish & Spit 4x Daily AC & HS    sodium chloride flush  10 mL Intravenous 2 times per day    sucralfate  1 g Oral 4 times per day     Continuous Infusions:   sodium chloride 150 mL/hr (18 0146)    sodium chloride       PRN Meds:.ondansetron, ondansetron, sodium chloride, alteplase, magnesium hydroxide, magnesium sulfate, potassium chloride, Saline Mouthwash, sodium chloride flush, acetaminophen    ROS  · Constitutional: Denies fever, sweats, weight loss. · Eyes: No visual changes or diplopia. No scleral icterus. · ENT: No Headaches, hearing loss or vertigo. No mouth sores or sore throat. · Cardiovascular: No chest pain, dyspnea on exertion, palpitations or loss of consciousness. · Respiratory: No cough or wheezing, no sputum production. No hemoptysis. · Gastrointestinal: No abdominal pain, appetite loss, blood in stools. No change in bowel habits. · Genitourinary: No dysuria, trouble voiding, or hematuria. · Musculoskeletal:  No generalized weakness. No joint complaints. · Integumentary: No rash or pruritis. · Neurological: No headache, diplopia.  No change in gait, balance, or 18   0410  18   0308   AST  16  18  28   ALT  11  14  25   BILIDIR  <0.2  <0.2  <0.2   BILITOT  0.3  0.4  0.4   ALKPHOS  23*  23*  23*     Coags:   No results for input(s): PROTIME, INR, APTT in the last 72 hours. Uric Acid   Recent Labs      18   0437  18   0410  18   0308   LABURIC  1.6*  1.7*  1.5*     Diagnostic Imagin. PET / CT scan (18):       CT Neck, Chest, Abdomen and Pelvis (18):  CT NECK:  1. Mildly enlarged right-sided level 2 lymph node, nonspecific. Active  lymphoma not excluded. CT CHEST:  1. Small pulmonary nodules measuring 3-4 mm. These are nonspecific. Follow-up is recommended according to oncology protocol. 2. There is a focal area of mild narrowing in the distal aortic  arch/proximal descending aorta with poststenotic dilatation, measurements  provided above. 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 Consider vascular surgical  consultation, clinically appropriate. CT ABDOMEN/PELVIS:  1. Marked lymphadenopathy in the abdomen pelvis as described above,  consistent with active lymphoma. 2. Marked splenomegaly, consistent with active lymphoma.     Pathology:  1.  Right Axilla Lymph Node Biopsy (18):    2.  BM bx/asp (18): IHC:       Cytogenetic:  No growth in culture   FISH:  WNL    EGD biopsies (18):       A. Duodenal biopsy (rule out celiac disease):     - No diagnostic alterations; see Comment.       B. Gastric biopsy (rule out lymphoma):     - No diagnostic alterations; see Comment.       C. Esophageal biopsy (ring):     - No diagnostic alterations; see Comment. COMMENT:  A: Sections contain proximal small bowel mucosa with small  number of Brunner glands, without diagnostic alterations. As  expected/normal for the biopsy location, the villi, where well-presented,  are tall, slender, and slightly scalloped, and stromal plasma cells are  plentiful.     B: Sections contain gastric mucosa compatible with bid (started 8/30/18)  - Cont Compazine as needed for nausea  - Cont low microbial diet  - Dietary to follow     7.  Depression / Anxiety: Very anxious and depressed  - Cont Wellbutrin daily    - Psych to follow        - DVT Prophylaxis: Platelets >07,389 cells/dL, - daily lovenox prophylaxis ordered  Contraindications to pharmacologic prophylaxis: None  Contraindications to mechanical prophylaxis: None    - Disposition: Unknown at this time      Rosa Castro MD

## 2018-09-03 ENCOUNTER — APPOINTMENT (OUTPATIENT)
Dept: GENERAL RADIOLOGY | Age: 58
DRG: 841 | End: 2018-09-03
Attending: INTERNAL MEDICINE
Payer: COMMERCIAL

## 2018-09-03 LAB
ALBUMIN SERPL-MCNC: 3.6 G/DL (ref 3.4–5)
ALP BLD-CCNC: 23 U/L (ref 40–129)
ALT SERPL-CCNC: 41 U/L (ref 10–40)
ANION GAP SERPL CALCULATED.3IONS-SCNC: 15 MMOL/L (ref 3–16)
APTT: 26.8 SEC (ref 26–36)
AST SERPL-CCNC: 45 U/L (ref 15–37)
BACTERIA: ABNORMAL /HPF
BASOPHILS ABSOLUTE: 0 K/UL (ref 0–0.2)
BASOPHILS RELATIVE PERCENT: 0 %
BILIRUB SERPL-MCNC: 0.5 MG/DL (ref 0–1)
BILIRUBIN DIRECT: <0.2 MG/DL (ref 0–0.3)
BILIRUBIN URINE: NEGATIVE
BILIRUBIN, INDIRECT: ABNORMAL MG/DL (ref 0–1)
BLOOD, URINE: NEGATIVE
BUN BLDV-MCNC: 18 MG/DL (ref 7–20)
CALCIUM SERPL-MCNC: 8.6 MG/DL (ref 8.3–10.6)
CHLORIDE BLD-SCNC: 92 MMOL/L (ref 99–110)
CLARITY: ABNORMAL
CO2: 23 MMOL/L (ref 21–32)
COLOR: YELLOW
CREAT SERPL-MCNC: 0.5 MG/DL (ref 0.9–1.3)
EOSINOPHILS ABSOLUTE: 0 K/UL (ref 0–0.6)
EOSINOPHILS RELATIVE PERCENT: 0 %
GFR AFRICAN AMERICAN: >60
GFR NON-AFRICAN AMERICAN: >60
GLUCOSE BLD-MCNC: 128 MG/DL (ref 70–99)
GLUCOSE URINE: NEGATIVE MG/DL
HCT VFR BLD CALC: 32 % (ref 40.5–52.5)
HEMOGLOBIN: 11 G/DL (ref 13.5–17.5)
INR BLD: 1.13 (ref 0.86–1.14)
KETONES, URINE: NEGATIVE MG/DL
LACTATE DEHYDROGENASE: 363 U/L (ref 100–190)
LEUKOCYTE ESTERASE, URINE: NEGATIVE
LYMPHOCYTES ABSOLUTE: 0 K/UL (ref 1–5.1)
LYMPHOCYTES RELATIVE PERCENT: 0 %
MAGNESIUM: 1.4 MG/DL (ref 1.8–2.4)
MCH RBC QN AUTO: 29.2 PG (ref 26–34)
MCHC RBC AUTO-ENTMCNC: 34.5 G/DL (ref 31–36)
MCV RBC AUTO: 84.6 FL (ref 80–100)
MICROSCOPIC EXAMINATION: YES
MONOCYTES ABSOLUTE: 0 K/UL (ref 0–1.3)
MONOCYTES RELATIVE PERCENT: 0 %
NEUTROPHILS ABSOLUTE: 14.1 K/UL (ref 1.7–7.7)
NEUTROPHILS RELATIVE PERCENT: 100 %
NITRITE, URINE: NEGATIVE
PDW BLD-RTO: 17.3 % (ref 12.4–15.4)
PH UA: 6
PHOSPHORUS: 4.6 MG/DL (ref 2.5–4.9)
PLATELET # BLD: 78 K/UL (ref 135–450)
PMV BLD AUTO: 6.5 FL (ref 5–10.5)
POTASSIUM SERPL-SCNC: 3.4 MMOL/L (ref 3.5–5.1)
PROTEIN UA: ABNORMAL MG/DL
PROTHROMBIN TIME: 12.9 SEC (ref 9.8–13)
RBC # BLD: 3.78 M/UL (ref 4.2–5.9)
RBC UA: ABNORMAL /HPF (ref 0–2)
SODIUM BLD-SCNC: 130 MMOL/L (ref 136–145)
SPECIFIC GRAVITY UA: 1.02
TOTAL PROTEIN: 5.6 G/DL (ref 6.4–8.2)
URIC ACID, SERUM: 2.2 MG/DL (ref 3.5–7.2)
URINE TYPE: ABNORMAL
UROBILINOGEN, URINE: 0.2 E.U./DL
WBC # BLD: 14.1 K/UL (ref 4–11)
WBC UA: ABNORMAL /HPF (ref 0–5)

## 2018-09-03 PROCEDURE — 6370000000 HC RX 637 (ALT 250 FOR IP): Performed by: INTERNAL MEDICINE

## 2018-09-03 PROCEDURE — 83735 ASSAY OF MAGNESIUM: CPT

## 2018-09-03 PROCEDURE — 6360000002 HC RX W HCPCS: Performed by: INTERNAL MEDICINE

## 2018-09-03 PROCEDURE — 71045 X-RAY EXAM CHEST 1 VIEW: CPT

## 2018-09-03 PROCEDURE — 85025 COMPLETE CBC W/AUTO DIFF WBC: CPT

## 2018-09-03 PROCEDURE — 2060000000 HC ICU INTERMEDIATE R&B

## 2018-09-03 PROCEDURE — 80076 HEPATIC FUNCTION PANEL: CPT

## 2018-09-03 PROCEDURE — 84100 ASSAY OF PHOSPHORUS: CPT

## 2018-09-03 PROCEDURE — 6370000000 HC RX 637 (ALT 250 FOR IP): Performed by: PSYCHIATRY & NEUROLOGY

## 2018-09-03 PROCEDURE — 6360000002 HC RX W HCPCS: Performed by: NURSE PRACTITIONER

## 2018-09-03 PROCEDURE — 2580000003 HC RX 258: Performed by: INTERNAL MEDICINE

## 2018-09-03 PROCEDURE — 83615 LACTATE (LD) (LDH) ENZYME: CPT

## 2018-09-03 PROCEDURE — 2580000003 HC RX 258: Performed by: NURSE PRACTITIONER

## 2018-09-03 PROCEDURE — 85610 PROTHROMBIN TIME: CPT

## 2018-09-03 PROCEDURE — 84550 ASSAY OF BLOOD/URIC ACID: CPT

## 2018-09-03 PROCEDURE — 36591 DRAW BLOOD OFF VENOUS DEVICE: CPT

## 2018-09-03 PROCEDURE — 80048 BASIC METABOLIC PNL TOTAL CA: CPT

## 2018-09-03 PROCEDURE — 6370000000 HC RX 637 (ALT 250 FOR IP): Performed by: NURSE PRACTITIONER

## 2018-09-03 PROCEDURE — 85730 THROMBOPLASTIN TIME PARTIAL: CPT

## 2018-09-03 PROCEDURE — 73521 X-RAY EXAM HIPS BI 2 VIEWS: CPT

## 2018-09-03 PROCEDURE — 81001 URINALYSIS AUTO W/SCOPE: CPT

## 2018-09-03 RX ORDER — OXYCODONE HYDROCHLORIDE 5 MG/1
5 TABLET ORAL EVERY 6 HOURS PRN
Status: DISCONTINUED | OUTPATIENT
Start: 2018-09-03 | End: 2018-09-04 | Stop reason: HOSPADM

## 2018-09-03 RX ORDER — OXYCODONE HYDROCHLORIDE 5 MG/1
10 TABLET ORAL EVERY 6 HOURS PRN
Status: DISCONTINUED | OUTPATIENT
Start: 2018-09-03 | End: 2018-09-04 | Stop reason: HOSPADM

## 2018-09-03 RX ADMIN — SUCRALFATE 1 G: 1 SUSPENSION ORAL at 18:17

## 2018-09-03 RX ADMIN — Medication 10 ML: at 20:35

## 2018-09-03 RX ADMIN — SODIUM CHLORIDE 150 ML/HR: 9 INJECTION, SOLUTION INTRAVENOUS at 12:36

## 2018-09-03 RX ADMIN — METOCLOPRAMIDE 5 MG: 5 INJECTION, SOLUTION INTRAMUSCULAR; INTRAVENOUS at 06:00

## 2018-09-03 RX ADMIN — SODIUM CHLORIDE 150 ML/HR: 9 INJECTION, SOLUTION INTRAVENOUS at 04:34

## 2018-09-03 RX ADMIN — SUCRALFATE 1 G: 1 SUSPENSION ORAL at 12:26

## 2018-09-03 RX ADMIN — METOCLOPRAMIDE 5 MG: 5 INJECTION, SOLUTION INTRAMUSCULAR; INTRAVENOUS at 18:00

## 2018-09-03 RX ADMIN — POTASSIUM CHLORIDE 20 MEQ: 400 INJECTION, SOLUTION INTRAVENOUS at 10:21

## 2018-09-03 RX ADMIN — SUCRALFATE 1 G: 1 SUSPENSION ORAL at 07:00

## 2018-09-03 RX ADMIN — ACETAMINOPHEN 650 MG: 325 TABLET ORAL at 08:34

## 2018-09-03 RX ADMIN — OXYCODONE HYDROCHLORIDE 5 MG: 5 TABLET ORAL at 04:10

## 2018-09-03 RX ADMIN — ACETAMINOPHEN 650 MG: 325 TABLET ORAL at 03:26

## 2018-09-03 RX ADMIN — DEXAMETHASONE 40 MG: 4 TABLET ORAL at 12:00

## 2018-09-03 RX ADMIN — ENOXAPARIN SODIUM 40 MG: 40 INJECTION SUBCUTANEOUS at 08:52

## 2018-09-03 RX ADMIN — DEXAMETHASONE SODIUM PHOSPHATE 2 DROP: 1 SOLUTION/ DROPS OPHTHALMIC at 12:33

## 2018-09-03 RX ADMIN — POTASSIUM CHLORIDE 20 MEQ: 400 INJECTION, SOLUTION INTRAVENOUS at 07:57

## 2018-09-03 RX ADMIN — OXYCODONE HYDROCHLORIDE 5 MG: 5 TABLET ORAL at 08:10

## 2018-09-03 RX ADMIN — MAGNESIUM SULFATE IN WATER 4 G: 40 INJECTION, SOLUTION INTRAVENOUS at 07:58

## 2018-09-03 RX ADMIN — DEXAMETHASONE SODIUM PHOSPHATE 2 DROP: 1 SOLUTION/ DROPS OPHTHALMIC at 07:00

## 2018-09-03 RX ADMIN — POTASSIUM CHLORIDE 20 MEQ: 400 INJECTION, SOLUTION INTRAVENOUS at 11:58

## 2018-09-03 RX ADMIN — METOCLOPRAMIDE 5 MG: 5 INJECTION, SOLUTION INTRAMUSCULAR; INTRAVENOUS at 12:26

## 2018-09-03 RX ADMIN — SODIUM CHLORIDE 15 ML: 900 IRRIGANT IRRIGATION at 20:35

## 2018-09-03 RX ADMIN — ACETAMINOPHEN 650 MG: 325 TABLET ORAL at 12:32

## 2018-09-03 RX ADMIN — METOCLOPRAMIDE 5 MG: 5 INJECTION, SOLUTION INTRAMUSCULAR; INTRAVENOUS at 20:35

## 2018-09-03 RX ADMIN — PANTOPRAZOLE SODIUM 40 MG: 40 TABLET, DELAYED RELEASE ORAL at 08:53

## 2018-09-03 RX ADMIN — POTASSIUM CHLORIDE 20 MEQ: 400 INJECTION, SOLUTION INTRAVENOUS at 08:57

## 2018-09-03 RX ADMIN — BUPROPION HYDROCHLORIDE 150 MG: 150 TABLET, FILM COATED, EXTENDED RELEASE ORAL at 08:52

## 2018-09-03 RX ADMIN — DEXAMETHASONE SODIUM PHOSPHATE 2 DROP: 1 SOLUTION/ DROPS OPHTHALMIC at 18:24

## 2018-09-03 RX ADMIN — Medication 10 ML: at 10:29

## 2018-09-03 RX ADMIN — ALLOPURINOL 300 MG: 300 TABLET ORAL at 08:51

## 2018-09-03 ASSESSMENT — PAIN DESCRIPTION - LOCATION
LOCATION: HIP;LEG
LOCATION: HIP;LEG
LOCATION: ABDOMEN;HIP;LEG
LOCATION: ABDOMEN;HIP;LEG
LOCATION: HIP;LEG

## 2018-09-03 ASSESSMENT — PAIN DESCRIPTION - PROGRESSION
CLINICAL_PROGRESSION: RAPIDLY WORSENING
CLINICAL_PROGRESSION: GRADUALLY WORSENING
CLINICAL_PROGRESSION: RAPIDLY WORSENING

## 2018-09-03 ASSESSMENT — PAIN DESCRIPTION - ONSET
ONSET: AWAKENED FROM SLEEP
ONSET: GRADUAL
ONSET: GRADUAL
ONSET: AWAKENED FROM SLEEP
ONSET: GRADUAL

## 2018-09-03 ASSESSMENT — PAIN SCALES - GENERAL
PAINLEVEL_OUTOF10: 10
PAINLEVEL_OUTOF10: 6
PAINLEVEL_OUTOF10: 4
PAINLEVEL_OUTOF10: 8
PAINLEVEL_OUTOF10: 9
PAINLEVEL_OUTOF10: 5
PAINLEVEL_OUTOF10: 5
PAINLEVEL_OUTOF10: 0
PAINLEVEL_OUTOF10: 5
PAINLEVEL_OUTOF10: 0
PAINLEVEL_OUTOF10: 0

## 2018-09-03 ASSESSMENT — PAIN DESCRIPTION - FREQUENCY
FREQUENCY: INTERMITTENT

## 2018-09-03 ASSESSMENT — PAIN DESCRIPTION - PAIN TYPE
TYPE: ACUTE PAIN

## 2018-09-03 ASSESSMENT — ACTIVITIES OF DAILY LIVING (ADL)
EFFECT OF PAIN ON DAILY ACTIVITIES: COMFORT
EFFECT OF PAIN ON DAILY ACTIVITIES: COMFORT
EFFECT OF PAIN ON DAILY ACTIVITIES: COMFORT, REST
EFFECT OF PAIN ON DAILY ACTIVITIES: COMFORT
EFFECT OF PAIN ON DAILY ACTIVITIES: COMFORT

## 2018-09-03 ASSESSMENT — PAIN DESCRIPTION - DESCRIPTORS
DESCRIPTORS: ACHING
DESCRIPTORS: ACHING
DESCRIPTORS: ACHING;SHARP
DESCRIPTORS: ACHING;SHARP
DESCRIPTORS: ACHING

## 2018-09-03 ASSESSMENT — PAIN DESCRIPTION - ORIENTATION
ORIENTATION: UPPER
ORIENTATION: OTHER (COMMENT)
ORIENTATION: OTHER (COMMENT)

## 2018-09-03 NOTE — PLAN OF CARE
Problem: Pain:  Goal: Control of acute pain  Control of acute pain   Outcome: Met This Shift  Patient medicated twice this shift for pain. See pain assessment, MAR, and response to pain intervention on flow sheet. At [address] patient with complaints of severe abdominal, hip, and leg pain. Tylenol was not helpful patient stated. Dr. Hallie Power notified and orders received for oxycodone which was given and patient without further complaints at this time. Patient lying in bed with eyes closed. Problem: Falls - Risk of:  Goal: Will remain free from falls  Will remain free from falls   Outcome: Met This Shift  Patient is a high risk for falls per fall assessment. Patient is in be this shift. Bed in low position with brake and bed alarm on. Call light in reach which patient uses appropriately. Patient remains free from alls throughout shift. Problem: Bleeding:  Goal: Will show no signs and symptoms of excessive bleeding  Will show no signs and symptoms of excessive bleeding   Outcome: Met This Shift  No active signs of bleeding. Patients hemoglobin and platelet count stable st 11 and 78,000. No transfusions needed today per MD standing order. Problem: PROTECTIVE PRECAUTIONS  Goal: Patient will remain free of nosocomial Infections  Outcome: Met This Shift  Patient in protective precautions. In private room, on low microbial diet, and all visitors and staff compliant with hand hygiene to prevent spread of infection. Problem: Infection - Central Venous Catheter-Associated Bloodstream Infection:  Goal: Will show no infection signs and symptoms  Will show no infection signs and symptoms   Outcome: Met This Shift  Patient afebrile this shift. No signs or symptoms of infection. Problem: Venous Thromboembolism:  Goal: Will show no signs or symptoms of venous thromboembolism  Will show no signs or symptoms of venous thromboembolism   Outcome: Met This Shift  No signs or symptoms of DVT.   Patient on Lovenox for DVT prophylaxis.

## 2018-09-03 NOTE — PROGRESS NOTES
gastric mucosa compatible with antrum/antrum to  fundus, without diagnostic alterations. Helicobacter organisms are not  apparent. C: Sections contain superficial squamous mucosa with small volume of  attached soft tissue, without diagnostic alterations (soft tissue  congestion and recent hemorrhage are noted, possibly related to the  biopsy process). A-C: at each of the biopsied sites, abnormal infiltrates, significant  architectural alterations, viral cytopathy, granulomas, dysplasia, and  malignancy are absent, with special attention paid to the 'Clinical  history' (see below).   BRATI/BRATI    PROBLEM LIST:           1.  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 Depression / Anxiety  3.  GERD  4.  HTN  5. Neutropenic Fever (7/2/18)      TREATMENT:           1.  CHOEP   Cycle #1 - 6/22/18  Cycle #2 - 7/16/18   2. DHAP  Cycle #1 - 8/31/18    ASSESSMENT AND PLAN:          1. Refractory PTCL, Stage IVb w/ BM involvement:   - CHOEP x 2 cycles w/ persistent lymphadenopathy on CT scan (8/22/18). Will ask radiology to compare recent CT scans from 8/22/18 to original PET scan in May 2018  -  Now w/ anorexia, abd discomfort and declining performance status concerning for refractory disease   - EGD Lili De Santiago, 8/29/18) -  Negative, no diagnostic alterations   - Refractory disease after 2 cycles of CHOEP  - Start DHAP (8/31/18)    Cycle 1, day + 3    2. ID: Patient is currently afebrile  - Not on abx     3. Heme: Anemia from previous chemotherapy   - Transfuse for Hgb < 7 and Platelets < 51 K.   - No transfusion today     4. Metabolic: Stable renal fxn and e-lytes WNL except for hypoNa  - Continue NS @ 50 mL/hr  - Replace Mg+ and K+ per protocol     5. Moderate malnutrition / GI: Increased abd pain and anorexia, may be from refractory disease  - EGD Lili De Santiago, 8/29/18) -  Negative, no diagnostic alterations, but stomach had significant erythema and edema. Retroflexion did show a hiatal hernia.   - Cont Pepcid bid  - Cont Carafate qid  - Cont PPI bid (started 8/30/18)  - Cont Compazine as needed for nausea  - Cont low microbial diet  - Dietary to follow     7.  Depression / Anxiety: Very anxious and depressed  - Cont Wellbutrin daily    - Psych to follow        - DVT Prophylaxis: Platelets >39,320 cells/dL, - daily lovenox prophylaxis ordered  Contraindications to pharmacologic prophylaxis: None  Contraindications to mechanical prophylaxis: None    - Disposition: Unknown at this time      Charline Dale MD

## 2018-09-04 ENCOUNTER — HOSPITAL ENCOUNTER (OUTPATIENT)
Dept: ONCOLOGY | Age: 58
Setting detail: INFUSION SERIES
Discharge: HOME | DRG: 841 | End: 2018-09-04
Payer: COMMERCIAL

## 2018-09-04 VITALS
HEIGHT: 71 IN | RESPIRATION RATE: 18 BRPM | SYSTOLIC BLOOD PRESSURE: 136 MMHG | OXYGEN SATURATION: 100 % | HEART RATE: 73 BPM | DIASTOLIC BLOOD PRESSURE: 95 MMHG | WEIGHT: 174.1 LBS | TEMPERATURE: 97.9 F | BODY MASS INDEX: 24.37 KG/M2

## 2018-09-04 LAB
ALBUMIN SERPL-MCNC: 3.6 G/DL (ref 3.4–5)
ALP BLD-CCNC: 21 U/L (ref 40–129)
ALT SERPL-CCNC: 31 U/L (ref 10–40)
ANION GAP SERPL CALCULATED.3IONS-SCNC: 11 MMOL/L (ref 3–16)
AST SERPL-CCNC: 37 U/L (ref 15–37)
BANDED NEUTROPHILS RELATIVE PERCENT: 7 % (ref 0–7)
BASOPHILS ABSOLUTE: 0 K/UL (ref 0–0.2)
BASOPHILS RELATIVE PERCENT: 0 %
BILIRUB SERPL-MCNC: 0.4 MG/DL (ref 0–1)
BILIRUBIN DIRECT: <0.2 MG/DL (ref 0–0.3)
BILIRUBIN, INDIRECT: ABNORMAL MG/DL (ref 0–1)
BUN BLDV-MCNC: 17 MG/DL (ref 7–20)
CALCIUM SERPL-MCNC: 8.5 MG/DL (ref 8.3–10.6)
CHLORIDE BLD-SCNC: 95 MMOL/L (ref 99–110)
CO2: 24 MMOL/L (ref 21–32)
CREAT SERPL-MCNC: <0.5 MG/DL (ref 0.9–1.3)
EOSINOPHILS ABSOLUTE: 0 K/UL (ref 0–0.6)
EOSINOPHILS RELATIVE PERCENT: 0 %
GFR AFRICAN AMERICAN: >60
GFR NON-AFRICAN AMERICAN: >60
GLUCOSE BLD-MCNC: 121 MG/DL (ref 70–99)
HCT VFR BLD CALC: 27.7 % (ref 40.5–52.5)
HEMOGLOBIN: 9.7 G/DL (ref 13.5–17.5)
LACTATE DEHYDROGENASE: 354 U/L (ref 100–190)
LYMPHOCYTES ABSOLUTE: 0.1 K/UL (ref 1–5.1)
LYMPHOCYTES RELATIVE PERCENT: 2 %
MCH RBC QN AUTO: 29.5 PG (ref 26–34)
MCHC RBC AUTO-ENTMCNC: 35.2 G/DL (ref 31–36)
MCV RBC AUTO: 83.7 FL (ref 80–100)
METAMYELOCYTES RELATIVE PERCENT: 1 %
MONOCYTES ABSOLUTE: 0 K/UL (ref 0–1.3)
MONOCYTES RELATIVE PERCENT: 1 %
NEUTROPHILS ABSOLUTE: 4.7 K/UL (ref 1.7–7.7)
NEUTROPHILS RELATIVE PERCENT: 89 %
PDW BLD-RTO: 17.3 % (ref 12.4–15.4)
PHOSPHORUS: 3.9 MG/DL (ref 2.5–4.9)
PLATELET # BLD: 58 K/UL (ref 135–450)
PLATELET SLIDE REVIEW: ABNORMAL
PMV BLD AUTO: 6.7 FL (ref 5–10.5)
POTASSIUM SERPL-SCNC: 4.6 MMOL/L (ref 3.5–5.1)
RBC # BLD: 3.31 M/UL (ref 4.2–5.9)
RBC # BLD: NORMAL 10*6/UL
SLIDE REVIEW: ABNORMAL
SODIUM BLD-SCNC: 130 MMOL/L (ref 136–145)
TOTAL PROTEIN: 5.3 G/DL (ref 6.4–8.2)
URIC ACID, SERUM: 2 MG/DL (ref 3.5–7.2)
WBC # BLD: 4.8 K/UL (ref 4–11)

## 2018-09-04 PROCEDURE — 6360000002 HC RX W HCPCS: Performed by: NURSE PRACTITIONER

## 2018-09-04 PROCEDURE — 80048 BASIC METABOLIC PNL TOTAL CA: CPT

## 2018-09-04 PROCEDURE — 84100 ASSAY OF PHOSPHORUS: CPT

## 2018-09-04 PROCEDURE — 6370000000 HC RX 637 (ALT 250 FOR IP): Performed by: NURSE PRACTITIONER

## 2018-09-04 PROCEDURE — 2580000003 HC RX 258: Performed by: INTERNAL MEDICINE

## 2018-09-04 PROCEDURE — 2580000003 HC RX 258: Performed by: NURSE PRACTITIONER

## 2018-09-04 PROCEDURE — 85025 COMPLETE CBC W/AUTO DIFF WBC: CPT

## 2018-09-04 PROCEDURE — 6360000002 HC RX W HCPCS: Performed by: INTERNAL MEDICINE

## 2018-09-04 PROCEDURE — 6370000000 HC RX 637 (ALT 250 FOR IP): Performed by: INTERNAL MEDICINE

## 2018-09-04 PROCEDURE — 84550 ASSAY OF BLOOD/URIC ACID: CPT

## 2018-09-04 PROCEDURE — 6370000000 HC RX 637 (ALT 250 FOR IP): Performed by: PSYCHIATRY & NEUROLOGY

## 2018-09-04 PROCEDURE — 81378 HLA I & II TYPING HR: CPT

## 2018-09-04 PROCEDURE — 83615 LACTATE (LD) (LDH) ENZYME: CPT

## 2018-09-04 PROCEDURE — 80076 HEPATIC FUNCTION PANEL: CPT

## 2018-09-04 RX ORDER — PANTOPRAZOLE SODIUM 40 MG/1
40 TABLET, DELAYED RELEASE ORAL DAILY
Qty: 30 TABLET | Refills: 3 | Status: ON HOLD | OUTPATIENT
Start: 2018-09-05 | End: 2018-11-08 | Stop reason: HOSPADM

## 2018-09-04 RX ORDER — SODIUM CHLORIDE 9 MG/ML
INJECTION, SOLUTION INTRAVENOUS CONTINUOUS
Status: DISCONTINUED | OUTPATIENT
Start: 2018-09-04 | End: 2018-09-04 | Stop reason: HOSPADM

## 2018-09-04 RX ORDER — DEXAMETHASONE SODIUM PHOSPHATE 1 MG/ML
2 SOLUTION/ DROPS OPHTHALMIC EVERY 6 HOURS
Qty: 10 ML | Refills: 0
Start: 2018-09-04 | End: 2018-10-20 | Stop reason: CLARIF

## 2018-09-04 RX ORDER — SUCRALFATE ORAL 1 G/10ML
1 SUSPENSION ORAL
Qty: 1200 ML | Refills: 3 | Status: SHIPPED | OUTPATIENT
Start: 2018-09-04 | End: 2018-10-20 | Stop reason: CLARIF

## 2018-09-04 RX ORDER — ONDANSETRON HYDROCHLORIDE 8 MG/1
8 TABLET, FILM COATED ORAL EVERY 8 HOURS PRN
Qty: 30 TABLET | Refills: 3 | Status: ON HOLD | OUTPATIENT
Start: 2018-09-04 | End: 2018-11-08 | Stop reason: HOSPADM

## 2018-09-04 RX ORDER — HEPARIN SODIUM (PORCINE) LOCK FLUSH IV SOLN 100 UNIT/ML 100 UNIT/ML
500 SOLUTION INTRAVENOUS ONCE
Status: COMPLETED | OUTPATIENT
Start: 2018-09-04 | End: 2018-09-04

## 2018-09-04 RX ORDER — METOCLOPRAMIDE 5 MG/1
5 TABLET ORAL 4 TIMES DAILY
Qty: 120 TABLET | Refills: 0 | Status: ON HOLD | OUTPATIENT
Start: 2018-09-04 | End: 2018-11-08 | Stop reason: HOSPADM

## 2018-09-04 RX ORDER — ALLOPURINOL 300 MG/1
300 TABLET ORAL DAILY
Qty: 30 TABLET | Refills: 3 | Status: SHIPPED | OUTPATIENT
Start: 2018-09-05 | End: 2018-10-20 | Stop reason: CLARIF

## 2018-09-04 RX ORDER — METOCLOPRAMIDE HYDROCHLORIDE 5 MG/ML
5 INJECTION INTRAMUSCULAR; INTRAVENOUS
Qty: 120 ML | Refills: 3 | Status: SHIPPED | OUTPATIENT
Start: 2018-09-04 | End: 2018-09-04 | Stop reason: HOSPADM

## 2018-09-04 RX ORDER — BUPROPION HYDROCHLORIDE 150 MG/1
150 TABLET ORAL DAILY
Qty: 30 TABLET | Refills: 3 | Status: ON HOLD | OUTPATIENT
Start: 2018-09-05 | End: 2018-11-08 | Stop reason: HOSPADM

## 2018-09-04 RX ADMIN — DEXAMETHASONE SODIUM PHOSPHATE 2 DROP: 1 SOLUTION/ DROPS OPHTHALMIC at 01:36

## 2018-09-04 RX ADMIN — DEXAMETHASONE SODIUM PHOSPHATE 2 DROP: 1 SOLUTION/ DROPS OPHTHALMIC at 06:59

## 2018-09-04 RX ADMIN — SODIUM CHLORIDE: 9 INJECTION, SOLUTION INTRAVENOUS at 10:53

## 2018-09-04 RX ADMIN — PANTOPRAZOLE SODIUM 40 MG: 40 TABLET, DELAYED RELEASE ORAL at 09:50

## 2018-09-04 RX ADMIN — ENOXAPARIN SODIUM 40 MG: 40 INJECTION SUBCUTANEOUS at 09:51

## 2018-09-04 RX ADMIN — SUCRALFATE 1 G: 1 SUSPENSION ORAL at 12:30

## 2018-09-04 RX ADMIN — MAGNESIUM HYDROXIDE 10 ML: 400 SUSPENSION ORAL at 11:14

## 2018-09-04 RX ADMIN — ACETAMINOPHEN 650 MG: 325 TABLET ORAL at 00:17

## 2018-09-04 RX ADMIN — METOCLOPRAMIDE 5 MG: 5 INJECTION, SOLUTION INTRAMUSCULAR; INTRAVENOUS at 09:53

## 2018-09-04 RX ADMIN — Medication 10 ML: at 10:53

## 2018-09-04 RX ADMIN — SUCRALFATE 1 G: 1 SUSPENSION ORAL at 09:50

## 2018-09-04 RX ADMIN — METOCLOPRAMIDE 5 MG: 5 INJECTION, SOLUTION INTRAMUSCULAR; INTRAVENOUS at 12:31

## 2018-09-04 RX ADMIN — BUPROPION HYDROCHLORIDE 150 MG: 150 TABLET, FILM COATED, EXTENDED RELEASE ORAL at 09:50

## 2018-09-04 RX ADMIN — ONDANSETRON HYDROCHLORIDE 8 MG: 8 TABLET, FILM COATED ORAL at 09:50

## 2018-09-04 RX ADMIN — SODIUM CHLORIDE 150 ML/HR: 9 INJECTION, SOLUTION INTRAVENOUS at 02:18

## 2018-09-04 RX ADMIN — DEXAMETHASONE SODIUM PHOSPHATE 2 DROP: 1 SOLUTION/ DROPS OPHTHALMIC at 12:31

## 2018-09-04 RX ADMIN — SODIUM CHLORIDE 15 ML: 900 IRRIGANT IRRIGATION at 09:52

## 2018-09-04 RX ADMIN — SODIUM CHLORIDE 15 ML: 900 IRRIGANT IRRIGATION at 12:31

## 2018-09-04 RX ADMIN — Medication 500 UNITS: at 13:26

## 2018-09-04 RX ADMIN — ALLOPURINOL 300 MG: 300 TABLET ORAL at 09:50

## 2018-09-04 RX ADMIN — SODIUM CHLORIDE 150 ML/HR: 9 INJECTION, SOLUTION INTRAVENOUS at 09:44

## 2018-09-04 ASSESSMENT — PAIN DESCRIPTION - ONSET
ONSET: GRADUAL

## 2018-09-04 ASSESSMENT — PAIN DESCRIPTION - PROGRESSION
CLINICAL_PROGRESSION: GRADUALLY WORSENING

## 2018-09-04 ASSESSMENT — PAIN SCALES - GENERAL
PAINLEVEL_OUTOF10: 0
PAINLEVEL_OUTOF10: 6
PAINLEVEL_OUTOF10: 3
PAINLEVEL_OUTOF10: 3
PAINLEVEL_OUTOF10: 0
PAINLEVEL_OUTOF10: 2
PAINLEVEL_OUTOF10: 5

## 2018-09-04 ASSESSMENT — PAIN DESCRIPTION - PAIN TYPE
TYPE: ACUTE PAIN

## 2018-09-04 ASSESSMENT — PAIN DESCRIPTION - ORIENTATION
ORIENTATION: UPPER;MID
ORIENTATION: LOWER

## 2018-09-04 ASSESSMENT — PAIN DESCRIPTION - DESCRIPTORS
DESCRIPTORS: ACHING

## 2018-09-04 ASSESSMENT — ACTIVITIES OF DAILY LIVING (ADL): EFFECT OF PAIN ON DAILY ACTIVITIES: COMFORT

## 2018-09-04 ASSESSMENT — PAIN DESCRIPTION - LOCATION
LOCATION: OTHER (COMMENT)
LOCATION: ABDOMEN
LOCATION: OTHER (COMMENT)

## 2018-09-04 ASSESSMENT — PAIN DESCRIPTION - FREQUENCY
FREQUENCY: INTERMITTENT

## 2018-09-04 NOTE — PROGRESS NOTES
Patient continues on bed alarm for BLE weakness. Patient requested PRN Zofran this morning for nausea. Patient had no further complaints. Patient able to eat some. Patient able to eat some of meal tray. Opted for other options which helped.

## 2018-09-04 NOTE — DISCHARGE SUMMARY
masses  CHEST: CTA bilaterally without use of accessory muscles  CV: Normal S1 S2, RRR, no MRG  ABD: NT ND normoactive BS, no palpable masses or hepatosplenomegaly  EXTREMITIES: without edema, denies calf tenderness  NEURO: CN II - XII grossly intact  CATHETER: Right PAC (IR , 18) - No erythema or tenderness    Discharge Laboratory Data:  CBC:   Recent Labs      18   035   WBC  9.1  14.1*  4.8   HGB  10.9*  11.0*  9.7*   HCT  31.2*  32.0*  27.7*   MCV  83.8  84.6  83.7   PLT  90*  78*  58*     BMP/Mag:  Recent Labs      18   035   NA  131*  130*   --   130*   K  4.4  3.4*   --   4.6   CL  96*  92*   --   95*   CO2  23  23   --   24   PHOS  4.3   --   4.6  3.9   BUN  10  18   --   17   CREATININE  <0.5*  0.5*   --   <0.5*   MG   --    --   1.40*   --      LIVP:   Recent Labs      18   035   AST  28  45*  37   ALT  25  41*  31   BILIDIR  <0.2  <0.2  <0.2   BILITOT  0.4  0.5  0.4   ALKPHOS  23*  23*  21*     Coags:   Recent Labs      18   PROTIME  12.9   INR  1.13   APTT  26.8     Uric Acid   Recent Labs      18   0352   LABURIC  1.5*  2.2*  2.0*     Tacro:  No results for input(s): TACROLEV in the last 72 hours. CMV Quant DNA by PCR: No results found for: CMVDNAQNT  IgG: No results for input(s): IGG in the last 72 hours. Diagnostic Imagin. PET / CT scan (18):       CT Neck, Chest, Abdomen and Pelvis (18):  CT NECK:  1. Mildly enlarged right-sided level 2 lymph node, nonspecific. Active  lymphoma not excluded. CT CHEST:  1. Small pulmonary nodules measuring 3-4 mm. These are nonspecific. Follow-up is recommended according to oncology protocol. 2. There is a focal area of mild narrowing in the distal aortic  arch/proximal descending aorta with poststenotic dilatation, measurements  provided above.  The

## 2018-09-04 NOTE — PROGRESS NOTES
Nutrition Assessment    Type and Reason for Visit: Reassess    Nutrition Recommendations:   1. PO Diet: Continue current diet   2. ONS: Continue to offer Magic Cup ONS BID  3. Monitor weights, labs and clinical progress  4. Monitor, record and encourage PO intake at all meals through admission. 5. Consume small/frequent meals and snacks to build appetite  6. Encourage adequate kcal and protein intake  7. Avoid high fat/fried foods and foods with strong odors to help control nausea       Malnutrition Assessment:  · Malnutrition Status: Meets the criteria for moderate malnutrition  · Context: Chronic illness  · Findings of the 6 clinical characteristics of malnutrition (Minimum of 2 out of 6 clinical characteristics is required to make the diagnosis of moderate or severe Protein Calorie Malnutrition based on AND/ASPEN Guidelines):  1. Energy Intake-Less than or equal to 75%, greater than 7 days    2. Weight Loss-10% loss or greater,  (2 months per EMR)  3. Fat Loss-Moderate subcutaneous fat loss, Orbital, Triceps  4. Muscle Loss-Moderate muscle mass loss, Thigh (quadriceps), Temples (temporalis muscle), Calf (gastrocnemius)  5. Fluid Accumulation-No significant fluid accumulation,    6.  Strength-Not measured    Nutrition Diagnosis:   · Problem: Inadequate oral intake  · Etiology: related to Insufficient energy/nutrient consumption     Signs and symptoms:  as evidenced by Nausea, Diet history of poor intake, Patient report of    Nutrition Assessment:  · Subjective Assessment: Follow-up for po and ONS intake. Pt reports he is doing ok. Pt complains of nausea and stomach ache. Pt states poor appetite and is forcing himself to eat. Pt reports he likes chocolate pudding and Vanilla flavored ONS. Pt states he tries to consume 1 Magic Cup per day. Pt complains of constipation (no BM in 4 days) per I/O. Nursing present. · Nutrition-Focused Physical Findings: Appears thin. No edema noted.  Unmeasured output stool

## 2018-09-04 NOTE — PLAN OF CARE
care   Outcome: Ongoing  Pt verbalizes understanding of discharge plan at this time. Will continue to monitor and provide support. Problem: PROTECTIVE PRECAUTIONS  Goal: Patient will remain free of nosocomial Infections  Outcome: Ongoing  Pt remains in protective precautions. Pt educated on wearing mask when in hallways. Pt, staff, and visitors adhering to handwashing guidelines. Pt educated to shower or bathe daily with chlorhexidine and linens changed daily per protocol. Pt verbalizes understanding of low microbial diet. Will continue to monitor. Problem: Infection - Central Venous Catheter-Associated Bloodstream Infection:  Goal: Will show no infection signs and symptoms  Will show no infection signs and symptoms   Outcome: Ongoing  CVC site remains free of signs/symptoms of infection. No drainage, edema, erythema, pain, or warmth noted at site. Dressing changes continue per protocol and on an as needed basis - see flowsheet. Compliant with [facility] Bath Protocol:  Performed CHG bath today per [facility] protocol utilizing CHG solution in the shower. CVC site cleansed with CHG wipe over dressing, skin surrounding dressing, and first 6\" of IV tubing. Pt tolerated well. Continued to encourage daily CHG bathing per [facility] protocol. Problem: Nutrition  Goal: Optimal nutrition therapy  Outcome: Ongoing  Patient eating drinking small amounts througout the day. Medication provided to help with nausea. Problem: Venous Thromboembolism:  Goal: Will show no signs or symptoms of venous thromboembolism  Will show no signs or symptoms of venous thromboembolism   Outcome: Ongoing  Adherent with DVT Prevention: Pt is at risk for DVT d/t decreased mobility and cancer treatment. Pt educated on importance of activity. Pt has orders for Subcut prophylactic lovenox. Pt verbalizes understanding of need for prophylaxis while inpatient.

## 2018-09-10 ENCOUNTER — HOSPITAL ENCOUNTER (OUTPATIENT)
Dept: ONCOLOGY | Age: 58
Setting detail: INFUSION SERIES
Discharge: HOME | End: 2018-09-10
Payer: COMMERCIAL

## 2018-09-10 VITALS
SYSTOLIC BLOOD PRESSURE: 126 MMHG | DIASTOLIC BLOOD PRESSURE: 80 MMHG | RESPIRATION RATE: 16 BRPM | TEMPERATURE: 97.6 F | HEART RATE: 82 BPM

## 2018-09-10 DIAGNOSIS — C80.1 CANCER (HCC): ICD-10-CM

## 2018-09-10 DIAGNOSIS — C84.49 LYMPHOMA, PERIPHERAL T-CELL (HCC): ICD-10-CM

## 2018-09-10 PROCEDURE — 6360000002 HC RX W HCPCS: Performed by: INTERNAL MEDICINE

## 2018-09-10 PROCEDURE — 96361 HYDRATE IV INFUSION ADD-ON: CPT

## 2018-09-10 PROCEDURE — 96360 HYDRATION IV INFUSION INIT: CPT

## 2018-09-10 PROCEDURE — 2580000003 HC RX 258: Performed by: PHYSICIAN ASSISTANT

## 2018-09-10 PROCEDURE — P9037 PLATE PHERES LEUKOREDU IRRAD: HCPCS

## 2018-09-10 PROCEDURE — 6370000000 HC RX 637 (ALT 250 FOR IP): Performed by: INTERNAL MEDICINE

## 2018-09-10 PROCEDURE — 36430 TRANSFUSION BLD/BLD COMPNT: CPT

## 2018-09-10 RX ORDER — SODIUM CHLORIDE 0.9 % (FLUSH) 0.9 %
10 SYRINGE (ML) INJECTION PRN
Status: CANCELLED | OUTPATIENT
Start: 2018-09-10

## 2018-09-10 RX ORDER — SODIUM CHLORIDE 0.9 % (FLUSH) 0.9 %
20 SYRINGE (ML) INJECTION PRN
Status: CANCELLED | OUTPATIENT
Start: 2018-09-10

## 2018-09-10 RX ORDER — DIPHENHYDRAMINE HCL 25 MG
25 TABLET ORAL ONCE
Status: CANCELLED | OUTPATIENT
Start: 2018-09-10 | End: 2018-09-10

## 2018-09-10 RX ORDER — ACETAMINOPHEN 325 MG/1
650 TABLET ORAL ONCE
Status: COMPLETED | OUTPATIENT
Start: 2018-09-10 | End: 2018-09-10

## 2018-09-10 RX ORDER — EPINEPHRINE 1 MG/ML
0.3 INJECTION, SOLUTION, CONCENTRATE INTRAVENOUS PRN
Status: CANCELLED | OUTPATIENT
Start: 2018-09-10

## 2018-09-10 RX ORDER — SODIUM CHLORIDE 9 MG/ML
INJECTION, SOLUTION INTRAVENOUS CONTINUOUS
Status: CANCELLED | OUTPATIENT
Start: 2018-09-10

## 2018-09-10 RX ORDER — DIPHENHYDRAMINE HYDROCHLORIDE 50 MG/ML
25 INJECTION INTRAMUSCULAR; INTRAVENOUS ONCE
Status: CANCELLED | OUTPATIENT
Start: 2018-09-10 | End: 2018-09-10

## 2018-09-10 RX ORDER — HEPARIN SODIUM (PORCINE) LOCK FLUSH IV SOLN 100 UNIT/ML 100 UNIT/ML
500 SOLUTION INTRAVENOUS PRN
Status: DISCONTINUED | OUTPATIENT
Start: 2018-09-10 | End: 2018-09-11 | Stop reason: HOSPADM

## 2018-09-10 RX ORDER — 0.9 % SODIUM CHLORIDE 0.9 %
10 VIAL (ML) INJECTION ONCE
Status: CANCELLED | OUTPATIENT
Start: 2018-09-10 | End: 2018-09-10

## 2018-09-10 RX ORDER — DIPHENHYDRAMINE HYDROCHLORIDE 50 MG/ML
50 INJECTION INTRAMUSCULAR; INTRAVENOUS ONCE
Status: CANCELLED | OUTPATIENT
Start: 2018-09-10 | End: 2018-09-10

## 2018-09-10 RX ORDER — METHYLPREDNISOLONE SODIUM SUCCINATE 125 MG/2ML
125 INJECTION, POWDER, LYOPHILIZED, FOR SOLUTION INTRAMUSCULAR; INTRAVENOUS ONCE
Status: CANCELLED | OUTPATIENT
Start: 2018-09-10 | End: 2018-09-10

## 2018-09-10 RX ORDER — ACETAMINOPHEN 325 MG/1
650 TABLET ORAL ONCE
Status: CANCELLED | OUTPATIENT
Start: 2018-09-10 | End: 2018-09-10

## 2018-09-10 RX ORDER — 0.9 % SODIUM CHLORIDE 0.9 %
1000 INTRAVENOUS SOLUTION INTRAVENOUS ONCE
Status: COMPLETED | OUTPATIENT
Start: 2018-09-10 | End: 2018-09-10

## 2018-09-10 RX ORDER — SODIUM CHLORIDE 9 MG/ML
INJECTION, SOLUTION INTRAVENOUS CONTINUOUS
Status: DISCONTINUED | OUTPATIENT
Start: 2018-09-10 | End: 2018-09-11 | Stop reason: HOSPADM

## 2018-09-10 RX ORDER — HEPARIN SODIUM (PORCINE) LOCK FLUSH IV SOLN 100 UNIT/ML 100 UNIT/ML
500 SOLUTION INTRAVENOUS PRN
Status: CANCELLED | OUTPATIENT
Start: 2018-09-10

## 2018-09-10 RX ADMIN — Medication 500 UNITS: at 16:57

## 2018-09-10 RX ADMIN — ACETAMINOPHEN 650 MG: 325 TABLET ORAL at 14:18

## 2018-09-10 RX ADMIN — SODIUM CHLORIDE 1000 ML: 9 INJECTION, SOLUTION INTRAVENOUS at 14:30

## 2018-09-10 NOTE — PLAN OF CARE
Problem: Falls - Risk of:  Goal: Will remain free from falls  Will remain free from falls  Outcome: Ongoing  Pt is a High fall risk. Explained fall risk precautions to pt & pt's wife and rationale behind their use to keep the patient safe. Belongings are in reach. Pt encouraged to notify staff for any and all assistance. Staff present in tx suite throughout entirety of pts treatment to monitor and protect from falls. Assistance provided when ambulating to restroom utilizing Stay With Me. Problem: Bleeding:  Goal: Will show no signs and symptoms of excessive bleeding  Will show no signs and symptoms of excessive bleeding  Outcome: Ongoing  Patient's hemoglobin this AM: 9.1  Patient's platelet count this AM: 6  Pt seen and assessed at [facility]. Seen at [facility] today for 1 unit Platelet transfusion per standing orders for above lab values per [facility]. Blood products transfused per [facility] policy. Pt tolerated transfusion well and without incident. Problem: Fluid Volume - Imbalance:  Goal: Absence of imbalanced fluid volume signs and symptoms  Absence of imbalanced fluid volume signs and symptoms  Outcome: Ongoing  Pt arrived in wheelchair to OPO, here to receive 1 L normal saline over 2 hours per order received from Donna Lemus PA-C for low blood pressure during [facility] appointment. Administered per order- see eMAR. Pt tolerated well and without incident, no s/s of fluid volume overload. Pt's vitals stable prior to discharge from unit- see vital signs flowsheets. Pt verbalizes understanding of discharge instructions. Discharged via wheelchair to home with pt's wife.

## 2018-09-11 LAB
BLOOD BANK DISPENSE STATUS: NORMAL
BLOOD BANK DISPENSE STATUS: NORMAL
BLOOD BANK PRODUCT CODE: NORMAL
BLOOD BANK PRODUCT CODE: NORMAL
BPU ID: NORMAL
BPU ID: NORMAL
DESCRIPTION BLOOD BANK: NORMAL
DESCRIPTION BLOOD BANK: NORMAL
EKG ATRIAL RATE: 67 BPM
EKG DIAGNOSIS: NORMAL
EKG P AXIS: 13 DEGREES
EKG P-R INTERVAL: 150 MS
EKG Q-T INTERVAL: 410 MS
EKG QRS DURATION: 94 MS
EKG QTC CALCULATION (BAZETT): 433 MS
EKG R AXIS: -44 DEGREES
EKG T AXIS: 42 DEGREES
EKG VENTRICULAR RATE: 67 BPM

## 2018-09-14 ENCOUNTER — HOSPITAL ENCOUNTER (OUTPATIENT)
Dept: ONCOLOGY | Age: 58
Setting detail: INFUSION SERIES
Discharge: HOME | End: 2018-09-14
Payer: COMMERCIAL

## 2018-09-14 VITALS
RESPIRATION RATE: 16 BRPM | DIASTOLIC BLOOD PRESSURE: 89 MMHG | SYSTOLIC BLOOD PRESSURE: 125 MMHG | TEMPERATURE: 97 F | HEART RATE: 81 BPM

## 2018-09-14 LAB
ABO/RH: NORMAL
ANTIBODY SCREEN: NORMAL
BLOOD BANK DISPENSE STATUS: NORMAL
BLOOD BANK DISPENSE STATUS: NORMAL
BLOOD BANK PRODUCT CODE: NORMAL
BLOOD BANK PRODUCT CODE: NORMAL
BPU ID: NORMAL
BPU ID: NORMAL
DESCRIPTION BLOOD BANK: NORMAL
DESCRIPTION BLOOD BANK: NORMAL

## 2018-09-14 PROCEDURE — 6370000000 HC RX 637 (ALT 250 FOR IP): Performed by: INTERNAL MEDICINE

## 2018-09-14 PROCEDURE — P9037 PLATE PHERES LEUKOREDU IRRAD: HCPCS

## 2018-09-14 PROCEDURE — 36591 DRAW BLOOD OFF VENOUS DEVICE: CPT

## 2018-09-14 PROCEDURE — 86900 BLOOD TYPING SEROLOGIC ABO: CPT

## 2018-09-14 PROCEDURE — 6360000002 HC RX W HCPCS: Performed by: INTERNAL MEDICINE

## 2018-09-14 PROCEDURE — 36430 TRANSFUSION BLD/BLD COMPNT: CPT

## 2018-09-14 PROCEDURE — 86850 RBC ANTIBODY SCREEN: CPT

## 2018-09-14 PROCEDURE — P9040 RBC LEUKOREDUCED IRRADIATED: HCPCS

## 2018-09-14 PROCEDURE — 86901 BLOOD TYPING SEROLOGIC RH(D): CPT

## 2018-09-14 PROCEDURE — 86923 COMPATIBILITY TEST ELECTRIC: CPT

## 2018-09-14 RX ORDER — SODIUM CHLORIDE 0.9 % (FLUSH) 0.9 %
20 SYRINGE (ML) INJECTION PRN
Status: CANCELLED | OUTPATIENT
Start: 2018-09-14

## 2018-09-14 RX ORDER — 0.9 % SODIUM CHLORIDE 0.9 %
10 VIAL (ML) INJECTION ONCE
Status: CANCELLED | OUTPATIENT
Start: 2018-09-14 | End: 2018-09-14

## 2018-09-14 RX ORDER — ACETAMINOPHEN 325 MG/1
650 TABLET ORAL
Status: COMPLETED | OUTPATIENT
Start: 2018-09-14 | End: 2018-09-14

## 2018-09-14 RX ORDER — EPINEPHRINE 1 MG/ML
0.3 INJECTION, SOLUTION, CONCENTRATE INTRAVENOUS PRN
Status: CANCELLED | OUTPATIENT
Start: 2018-09-14

## 2018-09-14 RX ORDER — DIPHENHYDRAMINE HYDROCHLORIDE 50 MG/ML
50 INJECTION INTRAMUSCULAR; INTRAVENOUS ONCE
Status: CANCELLED | OUTPATIENT
Start: 2018-09-14 | End: 2018-09-14

## 2018-09-14 RX ORDER — DIPHENHYDRAMINE HYDROCHLORIDE 50 MG/ML
25 INJECTION INTRAMUSCULAR; INTRAVENOUS ONCE
Status: CANCELLED | OUTPATIENT
Start: 2018-09-14 | End: 2018-09-14

## 2018-09-14 RX ORDER — DIPHENHYDRAMINE HCL 25 MG
25 TABLET ORAL ONCE
Status: CANCELLED | OUTPATIENT
Start: 2018-09-14 | End: 2018-09-14

## 2018-09-14 RX ORDER — HEPARIN SODIUM (PORCINE) LOCK FLUSH IV SOLN 100 UNIT/ML 100 UNIT/ML
300 SOLUTION INTRAVENOUS PRN
Status: DISCONTINUED | OUTPATIENT
Start: 2018-09-14 | End: 2018-09-15 | Stop reason: HOSPADM

## 2018-09-14 RX ORDER — METHYLPREDNISOLONE SODIUM SUCCINATE 125 MG/2ML
125 INJECTION, POWDER, LYOPHILIZED, FOR SOLUTION INTRAMUSCULAR; INTRAVENOUS ONCE
Status: CANCELLED | OUTPATIENT
Start: 2018-09-14 | End: 2018-09-14

## 2018-09-14 RX ORDER — SODIUM CHLORIDE 9 MG/ML
INJECTION, SOLUTION INTRAVENOUS CONTINUOUS
Status: CANCELLED | OUTPATIENT
Start: 2018-09-14

## 2018-09-14 RX ADMIN — Medication 300 UNITS: at 15:29

## 2018-09-14 RX ADMIN — ACETAMINOPHEN 650 MG: 325 TABLET ORAL at 12:35

## 2018-09-14 ASSESSMENT — PAIN SCALES - GENERAL: PAINLEVEL_OUTOF10: 0

## 2018-09-14 NOTE — PROGRESS NOTES
Patient's hemoglobin this AM: No results for input(s): HGB in the last 72 hours. Patient's platelet count this AM: No results for input(s): PLT in the last 72 hours. Seen at [address] [facility] today for 1 unit PRBC per standing orders for hgb 6.9. Blood product transfused per [facility] policy. Pt tolerated transfusion well and without incident. Pt and pt's wife verbalize understanding of discharge instructions. Discharged via wheelchair to home with pt's wife.

## 2018-09-14 NOTE — PROGRESS NOTES
Patient's hemoglobin this AM: 6.9 g/dL. Patient's platelet count this AM: 10.0 k/uL. Pt seen and assessed at [facility]. Seen at [address] [place] [address] today for PRBC transfusion per standing orders for above lab values. Blood products transfusing per [facility] policy. Pt tolerating transfusion well and without incident. Pt verbalizes understanding of discharge instructions. Will continue to monitor.

## 2018-09-21 ENCOUNTER — HOSPITAL ENCOUNTER (OUTPATIENT)
Dept: CT IMAGING | Age: 58
Discharge: HOME | End: 2018-09-21
Payer: COMMERCIAL

## 2018-09-21 ENCOUNTER — HOSPITAL ENCOUNTER (OUTPATIENT)
Dept: ONCOLOGY | Age: 58
Setting detail: INFUSION SERIES
Discharge: HOME | End: 2018-09-21
Payer: COMMERCIAL

## 2018-09-21 VITALS
DIASTOLIC BLOOD PRESSURE: 67 MMHG | RESPIRATION RATE: 16 BRPM | SYSTOLIC BLOOD PRESSURE: 106 MMHG | TEMPERATURE: 98.4 F | HEART RATE: 82 BPM

## 2018-09-21 DIAGNOSIS — C80.1 CANCER (HCC): ICD-10-CM

## 2018-09-21 DIAGNOSIS — C84.40 ANGIOIMMUNOBLASTIC LYMPHOMA (HCC): ICD-10-CM

## 2018-09-21 DIAGNOSIS — C84.49 LYMPHOMA, PERIPHERAL T-CELL (HCC): ICD-10-CM

## 2018-09-21 LAB
ABO/RH: NORMAL
ABO/RH: NORMAL
ANTIBODY SCREEN: NORMAL
BLOOD BANK DISPENSE STATUS: NORMAL
BLOOD BANK PRODUCT CODE: NORMAL
BPU ID: NORMAL
DESCRIPTION BLOOD BANK: NORMAL

## 2018-09-21 PROCEDURE — 86900 BLOOD TYPING SEROLOGIC ABO: CPT

## 2018-09-21 PROCEDURE — 74176 CT ABD & PELVIS W/O CONTRAST: CPT

## 2018-09-21 PROCEDURE — 86901 BLOOD TYPING SEROLOGIC RH(D): CPT

## 2018-09-21 PROCEDURE — 6360000002 HC RX W HCPCS: Performed by: INTERNAL MEDICINE

## 2018-09-21 PROCEDURE — 6370000000 HC RX 637 (ALT 250 FOR IP): Performed by: INTERNAL MEDICINE

## 2018-09-21 PROCEDURE — 86923 COMPATIBILITY TEST ELECTRIC: CPT

## 2018-09-21 PROCEDURE — 86850 RBC ANTIBODY SCREEN: CPT

## 2018-09-21 PROCEDURE — 36591 DRAW BLOOD OFF VENOUS DEVICE: CPT

## 2018-09-21 PROCEDURE — 36430 TRANSFUSION BLD/BLD COMPNT: CPT

## 2018-09-21 PROCEDURE — P9040 RBC LEUKOREDUCED IRRADIATED: HCPCS

## 2018-09-21 RX ORDER — SODIUM CHLORIDE 9 MG/ML
INJECTION, SOLUTION INTRAVENOUS CONTINUOUS
Status: CANCELLED | OUTPATIENT
Start: 2018-09-21

## 2018-09-21 RX ORDER — DIPHENHYDRAMINE HYDROCHLORIDE 50 MG/ML
50 INJECTION INTRAMUSCULAR; INTRAVENOUS ONCE
Status: CANCELLED | OUTPATIENT
Start: 2018-09-21 | End: 2018-09-21

## 2018-09-21 RX ORDER — SODIUM CHLORIDE 0.9 % (FLUSH) 0.9 %
20 SYRINGE (ML) INJECTION PRN
Status: CANCELLED | OUTPATIENT
Start: 2018-09-21

## 2018-09-21 RX ORDER — HEPARIN SODIUM (PORCINE) LOCK FLUSH IV SOLN 100 UNIT/ML 100 UNIT/ML
500 SOLUTION INTRAVENOUS PRN
Status: CANCELLED | OUTPATIENT
Start: 2018-09-21

## 2018-09-21 RX ORDER — 0.9 % SODIUM CHLORIDE 0.9 %
10 VIAL (ML) INJECTION ONCE
Status: CANCELLED | OUTPATIENT
Start: 2018-09-21 | End: 2018-09-21

## 2018-09-21 RX ORDER — HEPARIN SODIUM (PORCINE) LOCK FLUSH IV SOLN 100 UNIT/ML 100 UNIT/ML
500 SOLUTION INTRAVENOUS PRN
Status: DISCONTINUED | OUTPATIENT
Start: 2018-09-21 | End: 2018-09-22 | Stop reason: HOSPADM

## 2018-09-21 RX ORDER — ACETAMINOPHEN 325 MG/1
650 TABLET ORAL
Status: COMPLETED | OUTPATIENT
Start: 2018-09-21 | End: 2018-09-21

## 2018-09-21 RX ORDER — DIPHENHYDRAMINE HCL 25 MG
25 TABLET ORAL ONCE
Status: CANCELLED | OUTPATIENT
Start: 2018-09-21 | End: 2018-09-21

## 2018-09-21 RX ORDER — OXYCODONE HYDROCHLORIDE 5 MG/1
5 TABLET ORAL EVERY 4 HOURS PRN
COMMUNITY
End: 2018-10-20 | Stop reason: CLARIF

## 2018-09-21 RX ORDER — SODIUM CHLORIDE 0.9 % (FLUSH) 0.9 %
10 SYRINGE (ML) INJECTION PRN
Status: CANCELLED | OUTPATIENT
Start: 2018-09-21

## 2018-09-21 RX ORDER — EPINEPHRINE 1 MG/ML
0.3 INJECTION, SOLUTION, CONCENTRATE INTRAVENOUS PRN
Status: CANCELLED | OUTPATIENT
Start: 2018-09-21

## 2018-09-21 RX ORDER — METHYLPREDNISOLONE SODIUM SUCCINATE 125 MG/2ML
125 INJECTION, POWDER, LYOPHILIZED, FOR SOLUTION INTRAMUSCULAR; INTRAVENOUS ONCE
Status: CANCELLED | OUTPATIENT
Start: 2018-09-21 | End: 2018-09-21

## 2018-09-21 RX ORDER — DIPHENHYDRAMINE HYDROCHLORIDE 50 MG/ML
25 INJECTION INTRAMUSCULAR; INTRAVENOUS ONCE
Status: CANCELLED | OUTPATIENT
Start: 2018-09-21 | End: 2018-09-21

## 2018-09-21 RX ADMIN — ACETAMINOPHEN 650 MG: 325 TABLET ORAL at 14:00

## 2018-09-21 RX ADMIN — Medication 500 UNITS: at 16:05

## 2018-09-21 ASSESSMENT — PAIN SCALES - GENERAL: PAINLEVEL_OUTOF10: 0

## 2018-09-21 NOTE — PLAN OF CARE
Problem: KNOWLEDGE DEFICIT  Goal: Patient/S.O. demonstrates understanding of disease process, treatment plan, medications, and discharge instructions. Outcome: Ongoing  Patient's hemoglobin this AM: 7.4  Patient's platelet count this AM: 77  Pt seen and assessed at [facility]. Seen at [address] [place] [address] today for 1 unit of PRBC per standing orders for above lab values. Blood products transfused per [facility] policy. Pt tolerated transfusion well and without incident. Pt verbalizes understanding of discharge instructions. Discharged via wheelchair to home with family. [place]

## 2018-09-28 ENCOUNTER — HOSPITAL ENCOUNTER (INPATIENT)
Age: 58
LOS: 2 days | Discharge: HOME | DRG: 847 | End: 2018-09-30
Attending: INTERNAL MEDICINE | Admitting: INTERNAL MEDICINE
Payer: COMMERCIAL

## 2018-09-28 ENCOUNTER — APPOINTMENT (OUTPATIENT)
Dept: GENERAL RADIOLOGY | Age: 58
DRG: 847 | End: 2018-09-28
Attending: INTERNAL MEDICINE
Payer: COMMERCIAL

## 2018-09-28 PROBLEM — C85.90 LYMPHOMA, T-CELL (HCC): Status: ACTIVE | Noted: 2018-09-28

## 2018-09-28 LAB
ALBUMIN SERPL-MCNC: 3.8 G/DL (ref 3.4–5)
ALP BLD-CCNC: 28 U/L (ref 40–129)
ALT SERPL-CCNC: 19 U/L (ref 10–40)
ANION GAP SERPL CALCULATED.3IONS-SCNC: 11 MMOL/L (ref 3–16)
APTT: 27.5 SEC (ref 26–36)
AST SERPL-CCNC: 17 U/L (ref 15–37)
BASOPHILS ABSOLUTE: 0 K/UL (ref 0–0.2)
BASOPHILS RELATIVE PERCENT: 0.3 %
BILIRUB SERPL-MCNC: 0.4 MG/DL (ref 0–1)
BILIRUBIN DIRECT: <0.2 MG/DL (ref 0–0.3)
BILIRUBIN URINE: NEGATIVE
BILIRUBIN, INDIRECT: ABNORMAL MG/DL (ref 0–1)
BLOOD, URINE: NEGATIVE
BUN BLDV-MCNC: 12 MG/DL (ref 7–20)
CALCIUM SERPL-MCNC: 8.8 MG/DL (ref 8.3–10.6)
CHLORIDE BLD-SCNC: 93 MMOL/L (ref 99–110)
CLARITY: CLEAR
CO2: 27 MMOL/L (ref 21–32)
COLOR: YELLOW
CREAT SERPL-MCNC: 0.6 MG/DL (ref 0.9–1.3)
EOSINOPHILS ABSOLUTE: 0 K/UL (ref 0–0.6)
EOSINOPHILS RELATIVE PERCENT: 0.1 %
GFR AFRICAN AMERICAN: >60
GFR NON-AFRICAN AMERICAN: >60
GLUCOSE BLD-MCNC: 82 MG/DL (ref 70–99)
GLUCOSE URINE: NEGATIVE MG/DL
HCT VFR BLD CALC: 24.8 % (ref 40.5–52.5)
HEMOGLOBIN: 8.7 G/DL (ref 13.5–17.5)
INR BLD: 1.12 (ref 0.86–1.14)
KETONES, URINE: NEGATIVE MG/DL
LACTATE DEHYDROGENASE: 197 U/L (ref 100–190)
LEUKOCYTE ESTERASE, URINE: NEGATIVE
LYMPHOCYTES ABSOLUTE: 0.5 K/UL (ref 1–5.1)
LYMPHOCYTES RELATIVE PERCENT: 10.6 %
MAGNESIUM: 1.1 MG/DL (ref 1.8–2.4)
MCH RBC QN AUTO: 30.4 PG (ref 26–34)
MCHC RBC AUTO-ENTMCNC: 35.2 G/DL (ref 31–36)
MCV RBC AUTO: 86.4 FL (ref 80–100)
MICROSCOPIC EXAMINATION: NORMAL
MONOCYTES ABSOLUTE: 1 K/UL (ref 0–1.3)
MONOCYTES RELATIVE PERCENT: 18.8 %
NEUTROPHILS ABSOLUTE: 3.6 K/UL (ref 1.7–7.7)
NEUTROPHILS RELATIVE PERCENT: 70.2 %
NITRITE, URINE: NEGATIVE
PDW BLD-RTO: 20.7 % (ref 12.4–15.4)
PH UA: 7
PHOSPHORUS: 4.1 MG/DL (ref 2.5–4.9)
PLATELET # BLD: 127 K/UL (ref 135–450)
PMV BLD AUTO: 6.6 FL (ref 5–10.5)
POTASSIUM SERPL-SCNC: 4.3 MMOL/L (ref 3.5–5.1)
PROTEIN UA: NEGATIVE MG/DL
PROTHROMBIN TIME: 12.8 SEC (ref 9.8–13)
RBC # BLD: 2.87 M/UL (ref 4.2–5.9)
SODIUM BLD-SCNC: 131 MMOL/L (ref 136–145)
SPECIFIC GRAVITY UA: 1.01
TOTAL PROTEIN: 6.1 G/DL (ref 6.4–8.2)
URIC ACID, SERUM: 2.9 MG/DL (ref 3.5–7.2)
URINE TYPE: NORMAL
UROBILINOGEN, URINE: 1 E.U./DL
WBC # BLD: 5.2 K/UL (ref 4–11)

## 2018-09-28 PROCEDURE — 6360000002 HC RX W HCPCS: Performed by: INTERNAL MEDICINE

## 2018-09-28 PROCEDURE — 6360000002 HC RX W HCPCS: Performed by: NURSE PRACTITIONER

## 2018-09-28 PROCEDURE — 85610 PROTHROMBIN TIME: CPT

## 2018-09-28 PROCEDURE — 84100 ASSAY OF PHOSPHORUS: CPT

## 2018-09-28 PROCEDURE — 80076 HEPATIC FUNCTION PANEL: CPT

## 2018-09-28 PROCEDURE — 96415 CHEMO IV INFUSION ADDL HR: CPT

## 2018-09-28 PROCEDURE — 36591 DRAW BLOOD OFF VENOUS DEVICE: CPT

## 2018-09-28 PROCEDURE — 96417 CHEMO IV INFUS EACH ADDL SEQ: CPT

## 2018-09-28 PROCEDURE — 85025 COMPLETE CBC W/AUTO DIFF WBC: CPT

## 2018-09-28 PROCEDURE — 81003 URINALYSIS AUTO W/O SCOPE: CPT

## 2018-09-28 PROCEDURE — 2060000000 HC ICU INTERMEDIATE R&B

## 2018-09-28 PROCEDURE — 6370000000 HC RX 637 (ALT 250 FOR IP): Performed by: INTERNAL MEDICINE

## 2018-09-28 PROCEDURE — 3E03305 INTRODUCTION OF OTHER ANTINEOPLASTIC INTO PERIPHERAL VEIN, PERCUTANEOUS APPROACH: ICD-10-PCS | Performed by: INTERNAL MEDICINE

## 2018-09-28 PROCEDURE — 83735 ASSAY OF MAGNESIUM: CPT

## 2018-09-28 PROCEDURE — 84550 ASSAY OF BLOOD/URIC ACID: CPT

## 2018-09-28 PROCEDURE — 87081 CULTURE SCREEN ONLY: CPT

## 2018-09-28 PROCEDURE — 2580000003 HC RX 258: Performed by: INTERNAL MEDICINE

## 2018-09-28 PROCEDURE — 2580000003 HC RX 258: Performed by: NURSE PRACTITIONER

## 2018-09-28 PROCEDURE — 85730 THROMBOPLASTIN TIME PARTIAL: CPT

## 2018-09-28 PROCEDURE — 83615 LACTATE (LD) (LDH) ENZYME: CPT

## 2018-09-28 PROCEDURE — 6370000000 HC RX 637 (ALT 250 FOR IP): Performed by: NURSE PRACTITIONER

## 2018-09-28 PROCEDURE — 80048 BASIC METABOLIC PNL TOTAL CA: CPT

## 2018-09-28 PROCEDURE — 71046 X-RAY EXAM CHEST 2 VIEWS: CPT

## 2018-09-28 RX ORDER — DEXAMETHASONE SODIUM PHOSPHATE 1 MG/ML
2 SOLUTION/ DROPS OPHTHALMIC EVERY 6 HOURS SCHEDULED
Status: DISCONTINUED | OUTPATIENT
Start: 2018-09-29 | End: 2018-09-30 | Stop reason: HOSPADM

## 2018-09-28 RX ORDER — SODIUM CHLORIDE 0.9 % (FLUSH) 0.9 %
10 SYRINGE (ML) INJECTION PRN
Status: DISCONTINUED | OUTPATIENT
Start: 2018-09-28 | End: 2018-09-30 | Stop reason: HOSPADM

## 2018-09-28 RX ORDER — ONDANSETRON HYDROCHLORIDE 8 MG/1
24 TABLET, FILM COATED ORAL ONCE
Status: COMPLETED | OUTPATIENT
Start: 2018-09-29 | End: 2018-09-29

## 2018-09-28 RX ORDER — SODIUM CHLORIDE 9 MG/ML
150 INJECTION, SOLUTION INTRAVENOUS CONTINUOUS
Status: DISCONTINUED | OUTPATIENT
Start: 2018-09-28 | End: 2018-09-30 | Stop reason: HOSPADM

## 2018-09-28 RX ORDER — ALLOPURINOL 300 MG/1
300 TABLET ORAL DAILY
Status: DISCONTINUED | OUTPATIENT
Start: 2018-09-28 | End: 2018-09-30 | Stop reason: HOSPADM

## 2018-09-28 RX ORDER — DEXAMETHASONE 4 MG/1
40 TABLET ORAL ONCE
Status: COMPLETED | OUTPATIENT
Start: 2018-09-29 | End: 2018-09-29

## 2018-09-28 RX ORDER — DEXAMETHASONE 4 MG/1
40 TABLET ORAL EVERY 24 HOURS
Status: DISCONTINUED | OUTPATIENT
Start: 2018-09-30 | End: 2018-09-30 | Stop reason: HOSPADM

## 2018-09-28 RX ORDER — LEVOFLOXACIN 500 MG/1
500 TABLET, FILM COATED ORAL DAILY
Status: DISCONTINUED | OUTPATIENT
Start: 2018-09-28 | End: 2018-09-30 | Stop reason: HOSPADM

## 2018-09-28 RX ORDER — DOCUSATE SODIUM 100 MG/1
100 CAPSULE, LIQUID FILLED ORAL 2 TIMES DAILY
Status: ON HOLD | COMMUNITY
End: 2018-11-08 | Stop reason: HOSPADM

## 2018-09-28 RX ORDER — SODIUM CHLORIDE 9 MG/ML
333 INJECTION, SOLUTION INTRAVENOUS CONTINUOUS
Status: DISPENSED | OUTPATIENT
Start: 2018-09-28 | End: 2018-09-28

## 2018-09-28 RX ORDER — SODIUM CHLORIDE 0.9 % (FLUSH) 0.9 %
10 SYRINGE (ML) INJECTION EVERY 12 HOURS SCHEDULED
Status: DISCONTINUED | OUTPATIENT
Start: 2018-09-28 | End: 2018-09-30 | Stop reason: HOSPADM

## 2018-09-28 RX ORDER — BUPROPION HYDROCHLORIDE 150 MG/1
150 TABLET ORAL DAILY
Status: DISCONTINUED | OUTPATIENT
Start: 2018-09-29 | End: 2018-09-30 | Stop reason: HOSPADM

## 2018-09-28 RX ORDER — MAGNESIUM SULFATE IN WATER 40 MG/ML
4 INJECTION, SOLUTION INTRAVENOUS PRN
Status: DISCONTINUED | OUTPATIENT
Start: 2018-09-28 | End: 2018-09-30 | Stop reason: HOSPADM

## 2018-09-28 RX ORDER — SODIUM CHLORIDE 9 MG/ML
INJECTION, SOLUTION INTRAVENOUS CONTINUOUS PRN
Status: DISCONTINUED | OUTPATIENT
Start: 2018-09-28 | End: 2018-09-30 | Stop reason: HOSPADM

## 2018-09-28 RX ORDER — PROCHLORPERAZINE MALEATE 10 MG
10 TABLET ORAL EVERY 4 HOURS PRN
Status: DISCONTINUED | OUTPATIENT
Start: 2018-09-28 | End: 2018-09-30 | Stop reason: HOSPADM

## 2018-09-28 RX ORDER — OXYCODONE HYDROCHLORIDE 5 MG/1
5 TABLET ORAL EVERY 4 HOURS PRN
Status: DISCONTINUED | OUTPATIENT
Start: 2018-09-28 | End: 2018-09-30 | Stop reason: HOSPADM

## 2018-09-28 RX ORDER — PANTOPRAZOLE SODIUM 40 MG/1
40 TABLET, DELAYED RELEASE ORAL DAILY
Status: DISCONTINUED | OUTPATIENT
Start: 2018-09-28 | End: 2018-09-30 | Stop reason: HOSPADM

## 2018-09-28 RX ORDER — FUROSEMIDE 10 MG/ML
40 INJECTION INTRAMUSCULAR; INTRAVENOUS EVERY 12 HOURS
Status: DISCONTINUED | OUTPATIENT
Start: 2018-09-29 | End: 2018-09-30 | Stop reason: HOSPADM

## 2018-09-28 RX ORDER — LORAZEPAM 0.5 MG/1
0.5 TABLET ORAL EVERY 4 HOURS PRN
Status: DISCONTINUED | OUTPATIENT
Start: 2018-09-28 | End: 2018-09-28

## 2018-09-28 RX ORDER — DOCUSATE SODIUM 100 MG/1
100 CAPSULE, LIQUID FILLED ORAL 2 TIMES DAILY
Status: DISCONTINUED | OUTPATIENT
Start: 2018-09-28 | End: 2018-09-30 | Stop reason: HOSPADM

## 2018-09-28 RX ORDER — ONDANSETRON HYDROCHLORIDE 8 MG/1
24 TABLET, FILM COATED ORAL ONCE
Status: COMPLETED | OUTPATIENT
Start: 2018-09-28 | End: 2018-09-28

## 2018-09-28 RX ORDER — POTASSIUM CHLORIDE 29.8 MG/ML
80 INJECTION INTRAVENOUS PRN
Status: DISCONTINUED | OUTPATIENT
Start: 2018-09-28 | End: 2018-09-30 | Stop reason: HOSPADM

## 2018-09-28 RX ORDER — DEXAMETHASONE 4 MG/1
40 TABLET ORAL ONCE
Status: COMPLETED | OUTPATIENT
Start: 2018-09-28 | End: 2018-09-28

## 2018-09-28 RX ORDER — LORAZEPAM 2 MG/ML
0.5 INJECTION INTRAMUSCULAR EVERY 4 HOURS PRN
Status: DISCONTINUED | OUTPATIENT
Start: 2018-09-28 | End: 2018-09-28

## 2018-09-28 RX ORDER — LIDOCAINE AND PRILOCAINE 25; 25 MG/G; MG/G
CREAM TOPICAL ONCE
Status: COMPLETED | OUTPATIENT
Start: 2018-09-28 | End: 2018-09-28

## 2018-09-28 RX ADMIN — SODIUM CHLORIDE 15 ML: 900 IRRIGANT IRRIGATION at 16:54

## 2018-09-28 RX ADMIN — LEVOFLOXACIN 500 MG: 500 TABLET, FILM COATED ORAL at 19:33

## 2018-09-28 RX ADMIN — DEXAMETHASONE 40 MG: 4 TABLET ORAL at 16:49

## 2018-09-28 RX ADMIN — ONDANSETRON HYDROCHLORIDE 24 MG: 8 TABLET, FILM COATED ORAL at 16:49

## 2018-09-28 RX ADMIN — SODIUM CHLORIDE 333 ML/HR: 9 INJECTION, SOLUTION INTRAVENOUS at 12:23

## 2018-09-28 RX ADMIN — ENOXAPARIN SODIUM 40 MG: 40 INJECTION SUBCUTANEOUS at 14:34

## 2018-09-28 RX ADMIN — Medication 10 ML: at 20:52

## 2018-09-28 RX ADMIN — ALLOPURINOL 300 MG: 300 TABLET ORAL at 12:38

## 2018-09-28 RX ADMIN — CISPLATIN 105 MG: 100 INJECTION, SOLUTION INTRAVENOUS at 17:37

## 2018-09-28 RX ADMIN — PANTOPRAZOLE SODIUM 40 MG: 40 TABLET, DELAYED RELEASE ORAL at 14:34

## 2018-09-28 RX ADMIN — LIDOCAINE AND PRILOCAINE: 25; 25 CREAM TOPICAL at 11:13

## 2018-09-28 RX ADMIN — SODIUM CHLORIDE 150 ML/HR: 9 INJECTION, SOLUTION INTRAVENOUS at 15:47

## 2018-09-28 RX ADMIN — SODIUM CHLORIDE 15 ML: 900 IRRIGANT IRRIGATION at 12:24

## 2018-09-28 RX ADMIN — SODIUM CHLORIDE 15 ML: 900 IRRIGANT IRRIGATION at 20:52

## 2018-09-28 RX ADMIN — SODIUM CHLORIDE 150 ML/HR: 9 INJECTION, SOLUTION INTRAVENOUS at 23:24

## 2018-09-28 RX ADMIN — Medication 10 ML: at 12:23

## 2018-09-28 RX ADMIN — SODIUM CHLORIDE 150 MG: 900 INJECTION, SOLUTION INTRAVENOUS at 16:50

## 2018-09-28 ASSESSMENT — PAIN SCALES - GENERAL
PAINLEVEL_OUTOF10: 0

## 2018-09-28 NOTE — H&P
started sooner because the patient sought 2nd and 3rd opinions about his diagnosis. He tolerated chemotherapy well & went on to receive cycle #2 of CHOEP on 7/24/18.       After Cycle 2 of CHOEP, patient continued to complain of generalized malaise, nausea, GERD, and anorexia.  Dr. Neena Barajas with GI was consulted and EGD was performed on 8/28/18.  Multiple biopsies were performed which were negative for Belynda Deep was initiated on reglan scheduled AC & HS which improved his symptoms. Restaging CT scans performed after 2nd cycle unfortunately demonstrated refractory disease.  Patient was then initiated on DHAP on 8/31/18. Bonifacio Ba had abdominal pain as an outpatient and had CT abd/pelvis performed on 9/21/18 which showed interval decrease in size of multiple abdominal lymph nodes with no new or enlarging lymphadenopathy. He cont to be weak and has also developed new tinnitus likely r/t cisplatin from DHAP. He c/o ongoing leg weakness and states his appetite is a \"little better\". He states he isn't getting out of bed a lot at home. He states his stomach pain improves after eating. He is otherwise stable and presents to [facility] now for cycle 2 of DHAP. Cisplatin dose will be decreased by 50% due to ototoxicity. Past Surgical History:   Procedure Laterality Date    APPENDECTOMY      INGUINAL HERNIA REPAIR Right     UPPER GASTROINTESTINAL ENDOSCOPY N/A 8/29/2018    EGD BIOPSY performed by Iman Donis MD at [facility] ENDOSCOPY       Past Medical History:   Diagnosis Date    Anxiety     Cancer ([facility] [address] 06/2018    Peripheral T - Cell Lymphoma, Stage IVb    GERD (gastroesophageal reflux disease)     Hiatal hernia     History of blood transfusion     HTN (hypertension)        Prior to Admission medications    Medication Sig Start Date End Date Taking?  Authorizing Provider   docusate sodium (COLACE) 100 MG capsule Take 100 mg by mouth 2 times daily   Yes Historical Provider, MD   oxyCODONE (ROXICODONE) 5 MG chemotherapy  - Transfuse for Hgb < 7 and Platelets < 10 K.  - No transfusion today     4. Nausea/Abdominal Pain: ongoing  - Continue Zofran and Protonix  - Cont oxycodone 5 mg every 6 hours (Rx 9/6/18)  - Constipation: continue miralax and dulcolax  - Seen by Dr. Neena Barajas previously as outpt     5. Metabolic:  Electrolytes are WNL and renal fxn stable. - Start IVF hydration: NS @ 100 mL/hr  - Replace Potassium and Magnesium per PRN orders  - Risk for Tumor Lysis Syndrome:  No evidence at this time. Cont Allopurinol 300 mg daily. Check TLS labs daily     6. Nutrition: Poor-Fair appetite & intake  - Start low microbial diet  - Dietitian to follow closely      7. Debilitation: r/t lymphoma  - Will consult PT/OT next week if he doesn't go home on Monday       - DVT Prophylaxis: Platelets >37,786 cells/dL, - daily lovenox prophylaxis ordered  Contraindications to pharmacologic prophylaxis: None  Contraindications to mechanical prophylaxis: None    - Disposition: Once chemotherapy completed - either Sunday or Monday depending on when chemotherapy finishes. Will need neulasta OBI at d/c. The patient was seen and examined by Dr. Martha Gabriel. This admission history and physical has been discussed and agreed upon by Dr. Martha Gabriel.     Toan Soto, RN - BSN Student Nurse Practitioner    KYLIE Kent

## 2018-09-29 LAB
ALBUMIN SERPL-MCNC: 3.6 G/DL (ref 3.4–5)
ALP BLD-CCNC: 27 U/L (ref 40–129)
ALT SERPL-CCNC: 22 U/L (ref 10–40)
ANION GAP SERPL CALCULATED.3IONS-SCNC: 13 MMOL/L (ref 3–16)
ANISOCYTOSIS: ABNORMAL
AST SERPL-CCNC: 19 U/L (ref 15–37)
BANDED NEUTROPHILS RELATIVE PERCENT: 3 % (ref 0–7)
BASOPHILS ABSOLUTE: 0 K/UL (ref 0–0.2)
BASOPHILS RELATIVE PERCENT: 0 %
BILIRUB SERPL-MCNC: 0.3 MG/DL (ref 0–1)
BILIRUBIN DIRECT: <0.2 MG/DL (ref 0–0.3)
BILIRUBIN, INDIRECT: ABNORMAL MG/DL (ref 0–1)
BUN BLDV-MCNC: 16 MG/DL (ref 7–20)
CALCIUM SERPL-MCNC: 8.8 MG/DL (ref 8.3–10.6)
CHLORIDE BLD-SCNC: 97 MMOL/L (ref 99–110)
CO2: 23 MMOL/L (ref 21–32)
CREAT SERPL-MCNC: 0.6 MG/DL (ref 0.9–1.3)
EOSINOPHILS ABSOLUTE: 0 K/UL (ref 0–0.6)
EOSINOPHILS RELATIVE PERCENT: 0 %
GFR AFRICAN AMERICAN: >60
GFR NON-AFRICAN AMERICAN: >60
GLUCOSE BLD-MCNC: 151 MG/DL (ref 70–99)
HCT VFR BLD CALC: 22.7 % (ref 40.5–52.5)
HEMOGLOBIN: 8.1 G/DL (ref 13.5–17.5)
LACTATE DEHYDROGENASE: 185 U/L (ref 100–190)
LYMPHOCYTES ABSOLUTE: 0.3 K/UL (ref 1–5.1)
LYMPHOCYTES RELATIVE PERCENT: 9 %
MAGNESIUM: 2 MG/DL (ref 1.8–2.4)
MCH RBC QN AUTO: 30.7 PG (ref 26–34)
MCHC RBC AUTO-ENTMCNC: 35.8 G/DL (ref 31–36)
MCV RBC AUTO: 85.8 FL (ref 80–100)
METAMYELOCYTES RELATIVE PERCENT: 1 %
MONOCYTES ABSOLUTE: 0.1 K/UL (ref 0–1.3)
MONOCYTES RELATIVE PERCENT: 2 %
MYELOCYTE PERCENT: 1 %
NEUTROPHILS ABSOLUTE: 3.4 K/UL (ref 1.7–7.7)
NEUTROPHILS RELATIVE PERCENT: 84 %
PDW BLD-RTO: 20.8 % (ref 12.4–15.4)
PHOSPHORUS: 4.6 MG/DL (ref 2.5–4.9)
PLATELET # BLD: 97 K/UL (ref 135–450)
PLATELET SLIDE REVIEW: ABNORMAL
PMV BLD AUTO: 6.2 FL (ref 5–10.5)
POIKILOCYTES: ABNORMAL
POLYCHROMASIA: ABNORMAL
POTASSIUM SERPL-SCNC: 4.4 MMOL/L (ref 3.5–5.1)
RBC # BLD: 2.65 M/UL (ref 4.2–5.9)
SLIDE REVIEW: ABNORMAL
SODIUM BLD-SCNC: 133 MMOL/L (ref 136–145)
TOTAL PROTEIN: 5.8 G/DL (ref 6.4–8.2)
URIC ACID, SERUM: 2.6 MG/DL (ref 3.5–7.2)
WBC # BLD: 3.8 K/UL (ref 4–11)

## 2018-09-29 PROCEDURE — 80076 HEPATIC FUNCTION PANEL: CPT

## 2018-09-29 PROCEDURE — 96417 CHEMO IV INFUS EACH ADDL SEQ: CPT

## 2018-09-29 PROCEDURE — 6370000000 HC RX 637 (ALT 250 FOR IP): Performed by: NURSE PRACTITIONER

## 2018-09-29 PROCEDURE — 84550 ASSAY OF BLOOD/URIC ACID: CPT

## 2018-09-29 PROCEDURE — 83735 ASSAY OF MAGNESIUM: CPT

## 2018-09-29 PROCEDURE — 85025 COMPLETE CBC W/AUTO DIFF WBC: CPT

## 2018-09-29 PROCEDURE — 2580000003 HC RX 258: Performed by: INTERNAL MEDICINE

## 2018-09-29 PROCEDURE — 6370000000 HC RX 637 (ALT 250 FOR IP): Performed by: INTERNAL MEDICINE

## 2018-09-29 PROCEDURE — 6360000002 HC RX W HCPCS: Performed by: INTERNAL MEDICINE

## 2018-09-29 PROCEDURE — 6360000002 HC RX W HCPCS: Performed by: NURSE PRACTITIONER

## 2018-09-29 PROCEDURE — 80048 BASIC METABOLIC PNL TOTAL CA: CPT

## 2018-09-29 PROCEDURE — 94664 DEMO&/EVAL PT USE INHALER: CPT

## 2018-09-29 PROCEDURE — 2060000000 HC ICU INTERMEDIATE R&B

## 2018-09-29 PROCEDURE — 36591 DRAW BLOOD OFF VENOUS DEVICE: CPT

## 2018-09-29 PROCEDURE — 2580000003 HC RX 258: Performed by: NURSE PRACTITIONER

## 2018-09-29 PROCEDURE — 83615 LACTATE (LD) (LDH) ENZYME: CPT

## 2018-09-29 PROCEDURE — 96415 CHEMO IV INFUSION ADDL HR: CPT

## 2018-09-29 PROCEDURE — 84100 ASSAY OF PHOSPHORUS: CPT

## 2018-09-29 RX ADMIN — LEVOFLOXACIN 500 MG: 500 TABLET, FILM COATED ORAL at 08:09

## 2018-09-29 RX ADMIN — Medication 10 ML: at 20:17

## 2018-09-29 RX ADMIN — SODIUM CHLORIDE 15 ML: 900 IRRIGANT IRRIGATION at 20:18

## 2018-09-29 RX ADMIN — DEXAMETHASONE SODIUM PHOSPHATE 2 DROP: 1 SOLUTION/ DROPS OPHTHALMIC at 23:25

## 2018-09-29 RX ADMIN — BUPROPION HYDROCHLORIDE 150 MG: 150 TABLET, FILM COATED, EXTENDED RELEASE ORAL at 08:09

## 2018-09-29 RX ADMIN — SODIUM CHLORIDE 150 ML/HR: 9 INJECTION, SOLUTION INTRAVENOUS at 23:25

## 2018-09-29 RX ADMIN — DOCUSATE SODIUM 100 MG: 100 CAPSULE, LIQUID FILLED ORAL at 08:09

## 2018-09-29 RX ADMIN — CYTARABINE 4200 MG: 100 INJECTION, SOLUTION INTRATHECAL; INTRAVENOUS; SUBCUTANEOUS at 18:29

## 2018-09-29 RX ADMIN — MAGNESIUM SULFATE IN WATER 4 G: 40 INJECTION, SOLUTION INTRAVENOUS at 00:42

## 2018-09-29 RX ADMIN — SODIUM CHLORIDE 15 ML: 900 IRRIGANT IRRIGATION at 06:28

## 2018-09-29 RX ADMIN — SODIUM CHLORIDE 15 ML: 900 IRRIGANT IRRIGATION at 12:08

## 2018-09-29 RX ADMIN — ONDANSETRON HYDROCHLORIDE 24 MG: 8 TABLET, FILM COATED ORAL at 17:16

## 2018-09-29 RX ADMIN — DEXAMETHASONE 40 MG: 4 TABLET ORAL at 17:16

## 2018-09-29 RX ADMIN — SODIUM CHLORIDE 15 ML: 900 IRRIGANT IRRIGATION at 17:22

## 2018-09-29 RX ADMIN — DEXAMETHASONE SODIUM PHOSPHATE 2 DROP: 1 SOLUTION/ DROPS OPHTHALMIC at 12:07

## 2018-09-29 RX ADMIN — SODIUM CHLORIDE 150 ML/HR: 9 INJECTION, SOLUTION INTRAVENOUS at 13:52

## 2018-09-29 RX ADMIN — DEXAMETHASONE SODIUM PHOSPHATE 2 DROP: 1 SOLUTION/ DROPS OPHTHALMIC at 18:08

## 2018-09-29 RX ADMIN — Medication 10 ML: at 08:09

## 2018-09-29 RX ADMIN — ENOXAPARIN SODIUM 40 MG: 40 INJECTION SUBCUTANEOUS at 08:09

## 2018-09-29 RX ADMIN — PANTOPRAZOLE SODIUM 40 MG: 40 TABLET, DELAYED RELEASE ORAL at 08:15

## 2018-09-29 RX ADMIN — ALLOPURINOL 300 MG: 300 TABLET ORAL at 08:09

## 2018-09-29 RX ADMIN — FUROSEMIDE 40 MG: 10 INJECTION, SOLUTION INTRAMUSCULAR; INTRAVENOUS at 18:08

## 2018-09-29 RX ADMIN — SODIUM CHLORIDE 150 ML/HR: 9 INJECTION, SOLUTION INTRAVENOUS at 06:27

## 2018-09-29 ASSESSMENT — PAIN SCALES - GENERAL
PAINLEVEL_OUTOF10: 0

## 2018-09-30 VITALS
SYSTOLIC BLOOD PRESSURE: 114 MMHG | TEMPERATURE: 97.3 F | RESPIRATION RATE: 17 BRPM | WEIGHT: 188.3 LBS | BODY MASS INDEX: 26.36 KG/M2 | DIASTOLIC BLOOD PRESSURE: 69 MMHG | HEART RATE: 52 BPM | HEIGHT: 71 IN | OXYGEN SATURATION: 100 %

## 2018-09-30 LAB
ALBUMIN SERPL-MCNC: 3.3 G/DL (ref 3.4–5)
ALP BLD-CCNC: 24 U/L (ref 40–129)
ALT SERPL-CCNC: 23 U/L (ref 10–40)
ANION GAP SERPL CALCULATED.3IONS-SCNC: 12 MMOL/L (ref 3–16)
AST SERPL-CCNC: 16 U/L (ref 15–37)
BASOPHILS ABSOLUTE: 0 K/UL (ref 0–0.2)
BASOPHILS RELATIVE PERCENT: 0.4 %
BILIRUB SERPL-MCNC: <0.2 MG/DL (ref 0–1)
BILIRUBIN DIRECT: <0.2 MG/DL (ref 0–0.3)
BILIRUBIN, INDIRECT: ABNORMAL MG/DL (ref 0–1)
BUN BLDV-MCNC: 19 MG/DL (ref 7–20)
CALCIUM SERPL-MCNC: 8.6 MG/DL (ref 8.3–10.6)
CHLORIDE BLD-SCNC: 97 MMOL/L (ref 99–110)
CO2: 24 MMOL/L (ref 21–32)
CREAT SERPL-MCNC: 0.6 MG/DL (ref 0.9–1.3)
EOSINOPHILS ABSOLUTE: 0 K/UL (ref 0–0.6)
EOSINOPHILS RELATIVE PERCENT: 0 %
GFR AFRICAN AMERICAN: >60
GFR NON-AFRICAN AMERICAN: >60
GLUCOSE BLD-MCNC: 129 MG/DL (ref 70–99)
HCT VFR BLD CALC: 21.7 % (ref 40.5–52.5)
HEMOGLOBIN: 7.7 G/DL (ref 13.5–17.5)
LACTATE DEHYDROGENASE: 162 U/L (ref 100–190)
LYMPHOCYTES ABSOLUTE: 0.4 K/UL (ref 1–5.1)
LYMPHOCYTES RELATIVE PERCENT: 8.5 %
MAGNESIUM: 1.5 MG/DL (ref 1.8–2.4)
MCH RBC QN AUTO: 30.8 PG (ref 26–34)
MCHC RBC AUTO-ENTMCNC: 35.6 G/DL (ref 31–36)
MCV RBC AUTO: 86.5 FL (ref 80–100)
MONOCYTES ABSOLUTE: 0.2 K/UL (ref 0–1.3)
MONOCYTES RELATIVE PERCENT: 4.9 %
NEUTROPHILS ABSOLUTE: 3.9 K/UL (ref 1.7–7.7)
NEUTROPHILS RELATIVE PERCENT: 86.2 %
PDW BLD-RTO: 20.5 % (ref 12.4–15.4)
PHOSPHORUS: 4.3 MG/DL (ref 2.5–4.9)
PLATELET # BLD: 99 K/UL (ref 135–450)
PMV BLD AUTO: 6.4 FL (ref 5–10.5)
POTASSIUM SERPL-SCNC: 4.5 MMOL/L (ref 3.5–5.1)
RBC # BLD: 2.51 M/UL (ref 4.2–5.9)
SODIUM BLD-SCNC: 133 MMOL/L (ref 136–145)
TOTAL PROTEIN: 5.3 G/DL (ref 6.4–8.2)
URIC ACID, SERUM: 2.7 MG/DL (ref 3.5–7.2)
VRE CULTURE: NORMAL
WBC # BLD: 4.5 K/UL (ref 4–11)

## 2018-09-30 PROCEDURE — 6370000000 HC RX 637 (ALT 250 FOR IP): Performed by: INTERNAL MEDICINE

## 2018-09-30 PROCEDURE — 84100 ASSAY OF PHOSPHORUS: CPT

## 2018-09-30 PROCEDURE — 6360000002 HC RX W HCPCS: Performed by: INTERNAL MEDICINE

## 2018-09-30 PROCEDURE — 6360000002 HC RX W HCPCS: Performed by: NURSE PRACTITIONER

## 2018-09-30 PROCEDURE — 80048 BASIC METABOLIC PNL TOTAL CA: CPT

## 2018-09-30 PROCEDURE — 36592 COLLECT BLOOD FROM PICC: CPT

## 2018-09-30 PROCEDURE — 2580000003 HC RX 258: Performed by: INTERNAL MEDICINE

## 2018-09-30 PROCEDURE — 80076 HEPATIC FUNCTION PANEL: CPT

## 2018-09-30 PROCEDURE — 83735 ASSAY OF MAGNESIUM: CPT

## 2018-09-30 PROCEDURE — 85025 COMPLETE CBC W/AUTO DIFF WBC: CPT

## 2018-09-30 PROCEDURE — 84550 ASSAY OF BLOOD/URIC ACID: CPT

## 2018-09-30 PROCEDURE — 83615 LACTATE (LD) (LDH) ENZYME: CPT

## 2018-09-30 PROCEDURE — 6370000000 HC RX 637 (ALT 250 FOR IP): Performed by: NURSE PRACTITIONER

## 2018-09-30 PROCEDURE — 2580000003 HC RX 258: Performed by: NURSE PRACTITIONER

## 2018-09-30 RX ORDER — DEXAMETHASONE 4 MG/1
40 TABLET ORAL EVERY 24 HOURS
Qty: 10 TABLET | Refills: 0 | Status: SHIPPED | OUTPATIENT
Start: 2018-10-01 | End: 2018-10-02

## 2018-09-30 RX ORDER — HEPARIN SODIUM (PORCINE) LOCK FLUSH IV SOLN 100 UNIT/ML 100 UNIT/ML
500 SOLUTION INTRAVENOUS ONCE
Status: COMPLETED | OUTPATIENT
Start: 2018-09-30 | End: 2018-09-30

## 2018-09-30 RX ORDER — LEVOFLOXACIN 500 MG/1
500 TABLET, FILM COATED ORAL DAILY
Qty: 30 TABLET | Refills: 2 | Status: SHIPPED | OUTPATIENT
Start: 2018-10-01 | End: 2018-10-20 | Stop reason: CLARIF

## 2018-09-30 RX ADMIN — Medication 500 UNITS: at 13:33

## 2018-09-30 RX ADMIN — DEXAMETHASONE 40 MG: 4 TABLET ORAL at 08:09

## 2018-09-30 RX ADMIN — CYTARABINE 4200 MG: 100 INJECTION, SOLUTION INTRATHECAL; INTRAVENOUS; SUBCUTANEOUS at 04:59

## 2018-09-30 RX ADMIN — PANTOPRAZOLE SODIUM 40 MG: 40 TABLET, DELAYED RELEASE ORAL at 08:10

## 2018-09-30 RX ADMIN — SODIUM CHLORIDE 150 ML/HR: 9 INJECTION, SOLUTION INTRAVENOUS at 10:16

## 2018-09-30 RX ADMIN — DEXAMETHASONE SODIUM PHOSPHATE 2 DROP: 1 SOLUTION/ DROPS OPHTHALMIC at 13:33

## 2018-09-30 RX ADMIN — ENOXAPARIN SODIUM 40 MG: 40 INJECTION SUBCUTANEOUS at 08:09

## 2018-09-30 RX ADMIN — BUPROPION HYDROCHLORIDE 150 MG: 150 TABLET, FILM COATED, EXTENDED RELEASE ORAL at 08:10

## 2018-09-30 RX ADMIN — DOCUSATE SODIUM 100 MG: 100 CAPSULE, LIQUID FILLED ORAL at 08:10

## 2018-09-30 RX ADMIN — LEVOFLOXACIN 500 MG: 500 TABLET, FILM COATED ORAL at 08:10

## 2018-09-30 RX ADMIN — DEXAMETHASONE SODIUM PHOSPHATE 2 DROP: 1 SOLUTION/ DROPS OPHTHALMIC at 06:07

## 2018-09-30 RX ADMIN — ALLOPURINOL 300 MG: 300 TABLET ORAL at 08:10

## 2018-09-30 RX ADMIN — Medication 10 ML: at 08:10

## 2018-09-30 RX ADMIN — SODIUM CHLORIDE 15 ML: 900 IRRIGANT IRRIGATION at 06:08

## 2018-09-30 ASSESSMENT — PAIN SCALES - GENERAL
PAINLEVEL_OUTOF10: 0

## 2018-09-30 NOTE — CONSULTS
is  actually 70%. DIAGNOSTIC IMPRESSION:  AXIS I:  Major depression, recurrent, per history. Anxiety disorder, not  otherwise specified. Rule out mild neurocognitive disorder secondary to  traumatic brain injury. AXIS II:  No diagnosis. AXIS III:  Medical issues as noted. AXIS IV:  Life-threatening medical issues. AXIS V:  Unspecified. TREATMENT RECOMMENDATIONS:  1. We reviewed many options with the patient including increasing  Wellbutrin to 300 mg which could help with both depression and  energy/concentration. 2.  Alternatively could restart Prozac which he is not currently taking. This may help him start to worry less once again. 3.  Could also consider trial of low-dose stimulant medication which might  help with energy, concentration, and is sometimes effective with  postconcussive cognitive issues; however, the patient is in favor of  conservative approach at this point preferring to stay on the low dose of  Wellbutrin that he is on now. I asked him to give consideration to the  options list here, however, and to discuss with his family and Dr. Adam Matta  further.         Vance Cloud MD    D: 09/29/2018 16:35:49       T: 09/29/2018 19:58:46     /[email or web address]  Job#: [id number]     Doc#: [id number]    CC:

## 2018-09-30 NOTE — ONCOLOGY
Completion of Chemotherapy (Cytarabine): Monitoring during infusion done per policy, see Flowsheets. Blood return verified before, during, and after infusion per policy; no signs of extravasation. Pt tolerated chemotherapy well and without incident. Chemotherapy infusion end time on the [facility] Will continue to monitor.
incident. .  Will continue to monitor.

## 2018-09-30 NOTE — PLAN OF CARE
Problem: Falls - Risk of:  Goal: Will remain free from falls  Will remain free from falls   Outcome: Ongoing  Pt is a medium fall risk. See Roger Jalen Fall Score. Pt bed is in low position, side rails up, call light and belongings are in reach. Pt up ad romina, steady gait noted, bed alarm not in use. Pt encouraged to call for assistance, pt using call light appropriately. Will continue with hourly rounds for po intake, pain needs, toileting and repositioning as needed. Problem: Infection - Central Venous Catheter-Associated Bloodstream Infection:  Goal: Will show no infection signs and symptoms  Will show no infection signs and symptoms   Outcome: Ongoing      Pt afebrile. Right port accessed; site and dressing remain c/d/i. Lines flush well with good blood return; Tegaderm in place. Lines pinned per protocol. Will continue to monitor. Port site remains free of signs/symptoms of infection. No drainage, edema, erythema, pain, or warmth noted at site. Dressing changes continue per protocol and on an as needed basis - see flowsheet. Problem: PROTECTIVE PRECAUTIONS  Goal: Patient will remain free of nosocomial Infections  Outcome: Ongoing  Pt remains in neutropenic precautions per floor policy. Pt, visitors, and staff noted to be following precautions appropriately. Handwashing in place; pt wearing mask in hallway per protocol. Pt in private room. Low microbial diet in place. Will continue to monitor. Problem: Venous Thromboembolism:  Goal: Will show no signs or symptoms of venous thromboembolism  Will show no signs or symptoms of venous thromboembolism  Outcome: Ongoing  Adherent with DVT Prevention: Pt is at risk for DVT d/t decreased mobility and cancer treatment. Pt educated on importance of activity. Pt has orders for {DVT:82776::\"SCDs while in bed\",\"Subcut prophylactic lovenox\"}. Pt verbalizes understanding of need for prophylaxis while inpatient.    Refusing DVT Prevention: Pt is at risk for DVT d/t
BCC Bath Protocol:  Performed CHG bath today per [facility] protocol utilizing CHG solution in the shower. CVC site cleansed with CHG wipe over dressing, skin surrounding dressing, and first 6\" of IV tubing. Pt tolerated well. Continued to encourage daily CHG bathing per [facility] protocol.

## 2018-09-30 NOTE — PROGRESS NOTES
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      Isolation: none    Medications    Scheduled Meds:   enoxaparin  40 mg Subcutaneous Daily    Saline Mouthwash  15 mL Swish & Spit 4x Daily AC & HS    sodium chloride flush  10 mL Intravenous 2 times per day    allopurinol  300 mg Oral Daily    buPROPion  150 mg Oral Daily    docusate sodium  100 mg Oral BID    pantoprazole  40 mg Oral Daily    furosemide  40 mg Intravenous Q12H    dexamethasone  2 drop Both Eyes 4 times per day    dexamethasone  40 mg Oral Q24H    levofloxacin  500 mg Oral Daily     Continuous Infusions:   sodium chloride      sodium chloride 150 mL/hr (18 1016)     PRN Meds:.sodium chloride, alteplase, magnesium hydroxide, magnesium sulfate, potassium chloride, Saline Mouthwash, sodium chloride flush, oxyCODONE, prochlorperazine **OR** prochlorperazine    ROS: as above otherwise neg 10 point ROS     Physical    Patient Vitals for the past 24 hrs:   BP Temp Temp src Pulse Resp SpO2 Weight   18 1140 114/69 97.3 °F (36.3 °C) Axillary 52 17 100 % -   18 0830 128/77 - - - - - -   18 0800 123/83 97.9 °F (36.6 °C) Axillary 50 16 100 % 188 lb 4.8 oz (85.4 kg)   18 0604 123/77 97.3 °F (36.3 °C) Temporal 50 16 99 % -   18 0516 126/83 97.3 °F (36.3 °C) Temporal (!) 49 16 100 % -   18 0451 122/77 97.4 °F (36.3 °C) Temporal 50 15 99 % -   18 0315 121/78 97.2 °F (36.2 °C) Temporal 55 16 100 % -   180 110/70 97.7 °F (36.5 °C) Temporal 64 16 100 % -   18 102/70 97.3 °F (36.3 °C) Temporal 56 18 100 % -   09/29/18 1850 110/79 97.2 °F (36.2 °C) Oral 54 19 100 % -   09/29/18 1845 114/79 - - - - 100 % -   09/29/18 1835 113/81 97.2 °F (36.2 °C) Oral 53 16 100 % -   09/29/18 1638 112/76 97 °F (36.1 °C) Axillary 57 18 100 % -   09/29/18 1414 - - - - - 99 % -   09/29/18 1200 118/78 97.2 °F (36.2 °C) Oral 55 18 100 % -         Intake/Output Summary (Last 24 hours) at 09/30/18 1145  Last data filed at 09/30/18 0840   Gross per 24 hour   Intake             5474 ml   Output             5375 ml   Net               99 ml       Well developed, well nourished in no acute distress  Skin: Warm, dry no active rash  HEENT:  PERRL, Conjunctiva without erythema or icterus, oral mucosa moist and intact. Neck:  Supple without adenopathy  Catheter:  See Above  Cor:  RRR S1,S2, OK. No murmur rub or gallop. Lungs:  Unlabored breathing. Clear to A & P  Abdomen:  Soft, nontender, nondistended, normoactive BS, no hepatosplenomegaly or palpable masses. No rebound or guarding. Pelvic/:  No nodes, dewitt, flexiseal not present  Extremities:  No edema, cyanosis or clubbing  Neuro:  Awake, alert, well oriented. CN II-XII intact, no gross motor or sensory deficits.     Data    CBC:   Recent Labs      09/28/18   1239  09/29/18   0350  09/30/18   0320   WBC  5.2  3.8*  4.5   HGB  8.7*  8.1*  7.7*   HCT  24.8*  22.7*  21.7*   MCV  86.4  85.8  86.5   PLT  127*  97*  99*     BMP/Mag:  Recent Labs      09/28/18   1239  09/29/18   0350  09/30/18   0320   NA  131*  133*  133*   K  4.3  4.4  4.5   CL  93*  97*  97*   CO2  27  23  24   PHOS  4.1  4.6  4.3   BUN  12  16  19   CREATININE  0.6*  0.6*  0.6*   MG  1.10*  2.00  1.50*     LIVP:   Recent Labs      09/28/18   1239  09/29/18   0350  09/30/18   0320   AST  17  19  16   ALT  19  22  23   BILIDIR  <0.2  <0.2  <0.2   BILITOT  0.4  0.3  <0.2   ALKPHOS  28*  27*  24*     Coags:   Recent Labs      09/28/18   1239   PROTIME  12.8   INR  1.12   APTT  27.5          TREATMENT:

## 2018-10-01 LAB
EKG ATRIAL RATE: 78 BPM
EKG DIAGNOSIS: NORMAL
EKG P AXIS: 21 DEGREES
EKG P-R INTERVAL: 134 MS
EKG Q-T INTERVAL: 356 MS
EKG QRS DURATION: 96 MS
EKG QTC CALCULATION (BAZETT): 405 MS
EKG R AXIS: -27 DEGREES
EKG T AXIS: 48 DEGREES
EKG VENTRICULAR RATE: 78 BPM

## 2018-10-20 ENCOUNTER — APPOINTMENT (OUTPATIENT)
Dept: CT IMAGING | Age: 58
DRG: 177 | End: 2018-10-20
Payer: COMMERCIAL

## 2018-10-20 ENCOUNTER — HOSPITAL ENCOUNTER (INPATIENT)
Age: 58
LOS: 19 days | Discharge: HOSPICE | DRG: 177 | End: 2018-11-08
Attending: EMERGENCY MEDICINE | Admitting: INTERNAL MEDICINE
Payer: COMMERCIAL

## 2018-10-20 ENCOUNTER — APPOINTMENT (OUTPATIENT)
Dept: GENERAL RADIOLOGY | Age: 58
DRG: 177 | End: 2018-10-20
Payer: COMMERCIAL

## 2018-10-20 DIAGNOSIS — C84.49 PERIPHERAL T CELL LYMPHOMA OF EXTRANODAL AND SOLID ORGAN SITES (HCC): ICD-10-CM

## 2018-10-20 DIAGNOSIS — E87.1 HYPONATREMIA: Primary | ICD-10-CM

## 2018-10-20 DIAGNOSIS — R11.2 INTRACTABLE VOMITING WITH NAUSEA, UNSPECIFIED VOMITING TYPE: ICD-10-CM

## 2018-10-20 PROBLEM — J18.9 PNEUMONIA: Status: ACTIVE | Noted: 2018-10-20

## 2018-10-20 LAB
A/G RATIO: 1.8 (ref 1.1–2.2)
ALBUMIN SERPL-MCNC: 3.9 G/DL (ref 3.4–5)
ALP BLD-CCNC: 24 U/L (ref 40–129)
ALT SERPL-CCNC: 20 U/L (ref 10–40)
ANION GAP SERPL CALCULATED.3IONS-SCNC: 14 MMOL/L (ref 3–16)
ANISOCYTOSIS: ABNORMAL
AST SERPL-CCNC: 20 U/L (ref 15–37)
BANDED NEUTROPHILS RELATIVE PERCENT: 1 % (ref 0–7)
BASOPHILS ABSOLUTE: 0 K/UL (ref 0–0.2)
BASOPHILS RELATIVE PERCENT: 0 %
BILIRUB SERPL-MCNC: 0.5 MG/DL (ref 0–1)
BUN BLDV-MCNC: 20 MG/DL (ref 7–20)
CALCIUM SERPL-MCNC: 9 MG/DL (ref 8.3–10.6)
CHLORIDE BLD-SCNC: 90 MMOL/L (ref 99–110)
CO2: 23 MMOL/L (ref 21–32)
CREAT SERPL-MCNC: 0.5 MG/DL (ref 0.9–1.3)
EOSINOPHILS ABSOLUTE: 0 K/UL (ref 0–0.6)
EOSINOPHILS RELATIVE PERCENT: 0 %
GFR AFRICAN AMERICAN: >60
GFR NON-AFRICAN AMERICAN: >60
GLOBULIN: 2.2 G/DL
GLUCOSE BLD-MCNC: 136 MG/DL (ref 70–99)
HCT VFR BLD CALC: 24.1 % (ref 40.5–52.5)
HEMOGLOBIN: 8.3 G/DL (ref 13.5–17.5)
LIPASE: 27 U/L (ref 13–60)
LYMPHOCYTES ABSOLUTE: 0.6 K/UL (ref 1–5.1)
LYMPHOCYTES RELATIVE PERCENT: 3 %
MCH RBC QN AUTO: 31 PG (ref 26–34)
MCHC RBC AUTO-ENTMCNC: 34.3 G/DL (ref 31–36)
MCV RBC AUTO: 90.3 FL (ref 80–100)
MONOCYTES ABSOLUTE: 0.8 K/UL (ref 0–1.3)
MONOCYTES RELATIVE PERCENT: 4 %
NEUTROPHILS ABSOLUTE: 18.5 K/UL (ref 1.7–7.7)
NEUTROPHILS RELATIVE PERCENT: 92 %
OSMOLALITY: 270 MOSM/KG (ref 278–305)
PDW BLD-RTO: 21.8 % (ref 12.4–15.4)
PLATELET # BLD: 131 K/UL (ref 135–450)
PMV BLD AUTO: 7 FL (ref 5–10.5)
POLYCHROMASIA: ABNORMAL
POTASSIUM SERPL-SCNC: 3.7 MMOL/L (ref 3.5–5.1)
RAPID INFLUENZA  B AGN: NEGATIVE
RAPID INFLUENZA A AGN: NEGATIVE
RBC # BLD: 2.67 M/UL (ref 4.2–5.9)
SODIUM BLD-SCNC: 127 MMOL/L (ref 136–145)
TOTAL PROTEIN: 6.1 G/DL (ref 6.4–8.2)
TOXIC GRANULATION: PRESENT
TROPONIN: <0.01 NG/ML
WBC # BLD: 19.9 K/UL (ref 4–11)

## 2018-10-20 PROCEDURE — 96365 THER/PROPH/DIAG IV INF INIT: CPT

## 2018-10-20 PROCEDURE — 36415 COLL VENOUS BLD VENIPUNCTURE: CPT

## 2018-10-20 PROCEDURE — 83930 ASSAY OF BLOOD OSMOLALITY: CPT

## 2018-10-20 PROCEDURE — 87040 BLOOD CULTURE FOR BACTERIA: CPT

## 2018-10-20 PROCEDURE — 84484 ASSAY OF TROPONIN QUANT: CPT

## 2018-10-20 PROCEDURE — 6360000002 HC RX W HCPCS: Performed by: EMERGENCY MEDICINE

## 2018-10-20 PROCEDURE — 96361 HYDRATE IV INFUSION ADD-ON: CPT

## 2018-10-20 PROCEDURE — 2580000003 HC RX 258: Performed by: STUDENT IN AN ORGANIZED HEALTH CARE EDUCATION/TRAINING PROGRAM

## 2018-10-20 PROCEDURE — 96375 TX/PRO/DX INJ NEW DRUG ADDON: CPT

## 2018-10-20 PROCEDURE — 80053 COMPREHEN METABOLIC PANEL: CPT

## 2018-10-20 PROCEDURE — 83690 ASSAY OF LIPASE: CPT

## 2018-10-20 PROCEDURE — 93005 ELECTROCARDIOGRAM TRACING: CPT | Performed by: EMERGENCY MEDICINE

## 2018-10-20 PROCEDURE — 74177 CT ABD & PELVIS W/CONTRAST: CPT

## 2018-10-20 PROCEDURE — 71046 X-RAY EXAM CHEST 2 VIEWS: CPT

## 2018-10-20 PROCEDURE — 6360000004 HC RX CONTRAST MEDICATION: Performed by: EMERGENCY MEDICINE

## 2018-10-20 PROCEDURE — 6360000002 HC RX W HCPCS: Performed by: STUDENT IN AN ORGANIZED HEALTH CARE EDUCATION/TRAINING PROGRAM

## 2018-10-20 PROCEDURE — 85025 COMPLETE CBC W/AUTO DIFF WBC: CPT

## 2018-10-20 PROCEDURE — 87804 INFLUENZA ASSAY W/OPTIC: CPT

## 2018-10-20 PROCEDURE — 2580000003 HC RX 258: Performed by: EMERGENCY MEDICINE

## 2018-10-20 PROCEDURE — 99285 EMERGENCY DEPT VISIT HI MDM: CPT

## 2018-10-20 PROCEDURE — 2060000000 HC ICU INTERMEDIATE R&B

## 2018-10-20 RX ORDER — FAMOTIDINE 20 MG/1
20 TABLET, FILM COATED ORAL ONCE
Status: DISCONTINUED | OUTPATIENT
Start: 2018-10-20 | End: 2018-10-20

## 2018-10-20 RX ORDER — DOCUSATE SODIUM 100 MG/1
100 CAPSULE, LIQUID FILLED ORAL 2 TIMES DAILY
Status: DISCONTINUED | OUTPATIENT
Start: 2018-10-20 | End: 2018-10-26

## 2018-10-20 RX ORDER — BUPROPION HYDROCHLORIDE 150 MG/1
150 TABLET ORAL DAILY
Status: DISCONTINUED | OUTPATIENT
Start: 2018-10-21 | End: 2018-10-26

## 2018-10-20 RX ORDER — METOCLOPRAMIDE 10 MG/1
5 TABLET ORAL 4 TIMES DAILY
Status: DISCONTINUED | OUTPATIENT
Start: 2018-10-20 | End: 2018-10-22

## 2018-10-20 RX ORDER — SODIUM CHLORIDE 9 MG/ML
500 INJECTION, SOLUTION INTRAVENOUS CONTINUOUS PRN
Status: DISCONTINUED | OUTPATIENT
Start: 2018-10-20 | End: 2018-11-08 | Stop reason: HOSPADM

## 2018-10-20 RX ORDER — ONDANSETRON 2 MG/ML
4 INJECTION INTRAMUSCULAR; INTRAVENOUS
Status: COMPLETED | OUTPATIENT
Start: 2018-10-20 | End: 2018-10-20

## 2018-10-20 RX ORDER — PROCHLORPERAZINE MALEATE 10 MG
10 TABLET ORAL EVERY 6 HOURS PRN
Status: DISCONTINUED | OUTPATIENT
Start: 2018-10-20 | End: 2018-10-21

## 2018-10-20 RX ORDER — FAMOTIDINE 20 MG/1
20 TABLET, FILM COATED ORAL DAILY
Status: DISCONTINUED | OUTPATIENT
Start: 2018-10-21 | End: 2018-10-26

## 2018-10-20 RX ORDER — MAGNESIUM SULFATE IN WATER 40 MG/ML
4 INJECTION, SOLUTION INTRAVENOUS PRN
Status: DISCONTINUED | OUTPATIENT
Start: 2018-10-20 | End: 2018-11-07 | Stop reason: HOSPADM

## 2018-10-20 RX ORDER — ACETAMINOPHEN 325 MG/1
650 TABLET ORAL EVERY 6 HOURS PRN
Status: ON HOLD | COMMUNITY
End: 2018-11-08 | Stop reason: HOSPADM

## 2018-10-20 RX ORDER — POTASSIUM CHLORIDE 29.8 MG/ML
20 INJECTION INTRAVENOUS PRN
Status: DISCONTINUED | OUTPATIENT
Start: 2018-10-20 | End: 2018-11-07 | Stop reason: HOSPADM

## 2018-10-20 RX ORDER — ONDANSETRON HYDROCHLORIDE 8 MG/1
8 TABLET, FILM COATED ORAL EVERY 8 HOURS PRN
Status: DISCONTINUED | OUTPATIENT
Start: 2018-10-20 | End: 2018-10-21

## 2018-10-20 RX ORDER — 0.9 % SODIUM CHLORIDE 0.9 %
1000 INTRAVENOUS SOLUTION INTRAVENOUS ONCE
Status: COMPLETED | OUTPATIENT
Start: 2018-10-20 | End: 2018-10-20

## 2018-10-20 RX ORDER — RANITIDINE 150 MG/1
150 TABLET ORAL 2 TIMES DAILY PRN
Status: ON HOLD | COMMUNITY
End: 2018-11-08 | Stop reason: HOSPADM

## 2018-10-20 RX ORDER — SODIUM CHLORIDE 0.9 % (FLUSH) 0.9 %
10 SYRINGE (ML) INJECTION PRN
Status: DISCONTINUED | OUTPATIENT
Start: 2018-10-20 | End: 2018-11-08 | Stop reason: HOSPADM

## 2018-10-20 RX ORDER — POTASSIUM CHLORIDE 7.45 MG/ML
10 INJECTION INTRAVENOUS PRN
Status: DISCONTINUED | OUTPATIENT
Start: 2018-10-20 | End: 2018-10-21 | Stop reason: SDUPTHER

## 2018-10-20 RX ORDER — SODIUM CHLORIDE 9 MG/ML
INJECTION, SOLUTION INTRAVENOUS CONTINUOUS
Status: DISCONTINUED | OUTPATIENT
Start: 2018-10-20 | End: 2018-10-24

## 2018-10-20 RX ORDER — PANTOPRAZOLE SODIUM 40 MG/1
40 TABLET, DELAYED RELEASE ORAL DAILY
Status: DISCONTINUED | OUTPATIENT
Start: 2018-10-21 | End: 2018-10-26 | Stop reason: ALTCHOICE

## 2018-10-20 RX ORDER — POLYETHYLENE GLYCOL 3350 17 G/17G
17 POWDER, FOR SOLUTION ORAL DAILY PRN
Status: ON HOLD | COMMUNITY
End: 2018-11-08 | Stop reason: HOSPADM

## 2018-10-20 RX ORDER — FLUOXETINE HYDROCHLORIDE 20 MG/1
40 CAPSULE ORAL DAILY
Status: DISCONTINUED | OUTPATIENT
Start: 2018-10-21 | End: 2018-10-30

## 2018-10-20 RX ORDER — SODIUM CHLORIDE 0.9 % (FLUSH) 0.9 %
10 SYRINGE (ML) INJECTION EVERY 12 HOURS SCHEDULED
Status: DISCONTINUED | OUTPATIENT
Start: 2018-10-20 | End: 2018-11-08 | Stop reason: HOSPADM

## 2018-10-20 RX ORDER — FLUOXETINE HYDROCHLORIDE 40 MG/1
40 CAPSULE ORAL DAILY
Status: ON HOLD | COMMUNITY
End: 2018-11-08 | Stop reason: HOSPADM

## 2018-10-20 RX ADMIN — SODIUM CHLORIDE 1000 ML: 9 INJECTION, SOLUTION INTRAVENOUS at 19:12

## 2018-10-20 RX ADMIN — PIPERACILLIN SODIUM,TAZOBACTAM SODIUM 4.5 G: 4; .5 INJECTION, POWDER, FOR SOLUTION INTRAVENOUS at 20:58

## 2018-10-20 RX ADMIN — ONDANSETRON 4 MG: 2 INJECTION INTRAMUSCULAR; INTRAVENOUS at 19:12

## 2018-10-20 RX ADMIN — IOPAMIDOL 80 ML: 755 INJECTION, SOLUTION INTRAVENOUS at 19:55

## 2018-10-20 ASSESSMENT — PAIN DESCRIPTION - ONSET: ONSET: GRADUAL

## 2018-10-20 ASSESSMENT — PAIN SCALES - GENERAL
PAINLEVEL_OUTOF10: 7
PAINLEVEL_OUTOF10: 0

## 2018-10-20 ASSESSMENT — PAIN DESCRIPTION - PAIN TYPE: TYPE: ACUTE PAIN

## 2018-10-20 ASSESSMENT — PAIN DESCRIPTION - LOCATION: LOCATION: ABDOMEN

## 2018-10-20 ASSESSMENT — PAIN DESCRIPTION - PROGRESSION: CLINICAL_PROGRESSION: GRADUALLY WORSENING

## 2018-10-20 ASSESSMENT — PAIN DESCRIPTION - DESCRIPTORS: DESCRIPTORS: PRESSURE

## 2018-10-20 NOTE — ED PROVIDER NOTES
Negative Negative   CBC Auto Differential   Result Value Ref Range    WBC 19.9 (H) 4.0 - 11.0 K/uL    RBC 2.67 (L) 4.20 - 5.90 M/uL    Hemoglobin 8.3 (L) 13.5 - 17.5 g/dL    Hematocrit 24.1 (L) 40.5 - 52.5 %    MCV 90.3 80.0 - 100.0 fL    MCH 31.0 26.0 - 34.0 pg    MCHC 34.3 31.0 - 36.0 g/dL    RDW 21.8 (H) 12.4 - 15.4 %    Platelets 832 (L) 799 - 450 K/uL    MPV 7.0 5.0 - 10.5 fL    Neutrophils % 92.0 %    Lymphocytes % 3.0 %    Monocytes % 4.0 %    Eosinophils % 0.0 %    Basophils % 0.0 %    Neutrophils # 18.5 (H) 1.7 - 7.7 K/uL    Lymphocytes # 0.6 (L) 1.0 - 5.1 K/uL    Monocytes # 0.8 0.0 - 1.3 K/uL    Eosinophils # 0.0 0.0 - 0.6 K/uL    Basophils # 0.0 0.0 - 0.2 K/uL    Bands Relative 1 0 - 7 %    Toxic Granulation Present (A)     Anisocytosis 1+ (A)     Polychromasia 1+ (A)    Comprehensive Metabolic Panel   Result Value Ref Range    Sodium 127 (L) 136 - 145 mmol/L    Potassium 3.7 3.5 - 5.1 mmol/L    Chloride 90 (L) 99 - 110 mmol/L    CO2 23 21 - 32 mmol/L    Anion Gap 14 3 - 16    Glucose 136 (H) 70 - 99 mg/dL    BUN 20 7 - 20 mg/dL    CREATININE 0.5 (L) 0.9 - 1.3 mg/dL    GFR Non-African American >60 >60    GFR African American >60 >60    Calcium 9.0 8.3 - 10.6 mg/dL    Total Protein 6.1 (L) 6.4 - 8.2 g/dL    Alb 3.9 3.4 - 5.0 g/dL    Albumin/Globulin Ratio 1.8 1.1 - 2.2    Total Bilirubin 0.5 0.0 - 1.0 mg/dL    Alkaline Phosphatase 24 (L) 40 - 129 U/L    ALT 20 10 - 40 U/L    AST 20 15 - 37 U/L    Globulin 2.2 g/dL   Lipase   Result Value Ref Range    Lipase 27.0 13.0 - 60.0 U/L   Troponin   Result Value Ref Range    Troponin <0.01 <0.01 ng/mL   Osmolality   Result Value Ref Range    Osmolality 270 (L) 278 - 305 mOsm/kg       RECENT VITALS:  BP: (!) 145/90, Temp: 99.1 °F (37.3 °C), Pulse: 94, Resp: 18, SpO2: 100 %     Procedures     none    ED Course     Pt was given IVF blous, zofran, and GI cocktail with good improvement of symptoms.  CT abdomen showed large stool burden and bilateral lower lobe opacities

## 2018-10-21 LAB
ALBUMIN SERPL-MCNC: 3.6 G/DL (ref 3.4–5)
ALP BLD-CCNC: 20 U/L (ref 40–129)
ALT SERPL-CCNC: 16 U/L (ref 10–40)
ANION GAP SERPL CALCULATED.3IONS-SCNC: 14 MMOL/L (ref 3–16)
ANION GAP SERPL CALCULATED.3IONS-SCNC: 15 MMOL/L (ref 3–16)
ANISOCYTOSIS: ABNORMAL
APTT: 28.9 SEC (ref 26–36)
AST SERPL-CCNC: 16 U/L (ref 15–37)
BACTERIA: ABNORMAL /HPF
BANDED NEUTROPHILS RELATIVE PERCENT: 5 % (ref 0–7)
BASOPHILS ABSOLUTE: 0 K/UL (ref 0–0.2)
BASOPHILS RELATIVE PERCENT: 0 %
BILIRUB SERPL-MCNC: 0.5 MG/DL (ref 0–1)
BILIRUBIN DIRECT: <0.2 MG/DL (ref 0–0.3)
BILIRUBIN URINE: NEGATIVE
BILIRUBIN, INDIRECT: ABNORMAL MG/DL (ref 0–1)
BLOOD, URINE: NEGATIVE
BUN BLDV-MCNC: 17 MG/DL (ref 7–20)
BUN BLDV-MCNC: 17 MG/DL (ref 7–20)
CALCIUM SERPL-MCNC: 8.8 MG/DL (ref 8.3–10.6)
CALCIUM SERPL-MCNC: 8.9 MG/DL (ref 8.3–10.6)
CHLORIDE BLD-SCNC: 89 MMOL/L (ref 99–110)
CHLORIDE BLD-SCNC: 90 MMOL/L (ref 99–110)
CLARITY: CLEAR
CO2: 23 MMOL/L (ref 21–32)
CO2: 23 MMOL/L (ref 21–32)
COLOR: YELLOW
CREAT SERPL-MCNC: 0.6 MG/DL (ref 0.9–1.3)
CREAT SERPL-MCNC: 0.6 MG/DL (ref 0.9–1.3)
DOHLE BODIES: PRESENT
EOSINOPHILS ABSOLUTE: 0 K/UL (ref 0–0.6)
EOSINOPHILS RELATIVE PERCENT: 0 %
EPITHELIAL CELLS, UA: ABNORMAL /HPF
GFR AFRICAN AMERICAN: >60
GFR AFRICAN AMERICAN: >60
GFR NON-AFRICAN AMERICAN: >60
GFR NON-AFRICAN AMERICAN: >60
GLUCOSE BLD-MCNC: 109 MG/DL (ref 70–99)
GLUCOSE BLD-MCNC: 111 MG/DL (ref 70–99)
GLUCOSE URINE: NEGATIVE MG/DL
HCT VFR BLD CALC: 20.6 % (ref 40.5–52.5)
HEMOGLOBIN: 7.2 G/DL (ref 13.5–17.5)
KETONES, URINE: NEGATIVE MG/DL
LACTATE DEHYDROGENASE: 296 U/L (ref 100–190)
LEUKOCYTE ESTERASE, URINE: NEGATIVE
LYMPHOCYTES ABSOLUTE: 0.2 K/UL (ref 1–5.1)
LYMPHOCYTES RELATIVE PERCENT: 1 %
MACROCYTES: ABNORMAL
MAGNESIUM: 1 MG/DL (ref 1.8–2.4)
MCH RBC QN AUTO: 31.4 PG (ref 26–34)
MCHC RBC AUTO-ENTMCNC: 35 G/DL (ref 31–36)
MCV RBC AUTO: 89.8 FL (ref 80–100)
MICROCYTES: ABNORMAL
MICROSCOPIC EXAMINATION: ABNORMAL
MONOCYTES ABSOLUTE: 1.5 K/UL (ref 0–1.3)
MONOCYTES RELATIVE PERCENT: 9 %
MUCUS: ABNORMAL /LPF
NEUTROPHILS ABSOLUTE: 15.4 K/UL (ref 1.7–7.7)
NEUTROPHILS RELATIVE PERCENT: 85 %
NITRITE, URINE: NEGATIVE
OSMOLALITY URINE: 730 MOSM/KG (ref 390–1070)
PDW BLD-RTO: 22.1 % (ref 12.4–15.4)
PH UA: 6.5
PHOSPHORUS: 4.7 MG/DL (ref 2.5–4.9)
PLATELET # BLD: 108 K/UL (ref 135–450)
PLATELET SLIDE REVIEW: ADEQUATE
PMV BLD AUTO: 7 FL (ref 5–10.5)
POLYCHROMASIA: ABNORMAL
POTASSIUM SERPL-SCNC: 3.6 MMOL/L (ref 3.5–5.1)
POTASSIUM SERPL-SCNC: 3.6 MMOL/L (ref 3.5–5.1)
PROTEIN UA: NEGATIVE MG/DL
RBC # BLD: 2.3 M/UL (ref 4.2–5.9)
RBC UA: ABNORMAL /HPF (ref 0–2)
RENAL EPITHELIAL, UA: ABNORMAL /HPF
SLIDE REVIEW: ABNORMAL
SODIUM BLD-SCNC: 127 MMOL/L (ref 136–145)
SODIUM BLD-SCNC: 127 MMOL/L (ref 136–145)
SPECIFIC GRAVITY UA: 1.01
TOTAL PROTEIN: 5.3 G/DL (ref 6.4–8.2)
TOXIC GRANULATION: PRESENT
URIC ACID, SERUM: 2.6 MG/DL (ref 3.5–7.2)
UROBILINOGEN, URINE: 1 E.U./DL
WBC # BLD: 17.1 K/UL (ref 4–11)
WBC UA: ABNORMAL /HPF (ref 0–5)

## 2018-10-21 PROCEDURE — 94150 VITAL CAPACITY TEST: CPT

## 2018-10-21 PROCEDURE — 84100 ASSAY OF PHOSPHORUS: CPT

## 2018-10-21 PROCEDURE — 87081 CULTURE SCREEN ONLY: CPT

## 2018-10-21 PROCEDURE — 83615 LACTATE (LD) (LDH) ENZYME: CPT

## 2018-10-21 PROCEDURE — 81001 URINALYSIS AUTO W/SCOPE: CPT

## 2018-10-21 PROCEDURE — 83935 ASSAY OF URINE OSMOLALITY: CPT

## 2018-10-21 PROCEDURE — 85025 COMPLETE CBC W/AUTO DIFF WBC: CPT

## 2018-10-21 PROCEDURE — 85730 THROMBOPLASTIN TIME PARTIAL: CPT

## 2018-10-21 PROCEDURE — 94760 N-INVAS EAR/PLS OXIMETRY 1: CPT

## 2018-10-21 PROCEDURE — 2060000000 HC ICU INTERMEDIATE R&B

## 2018-10-21 PROCEDURE — 80076 HEPATIC FUNCTION PANEL: CPT

## 2018-10-21 PROCEDURE — 36591 DRAW BLOOD OFF VENOUS DEVICE: CPT

## 2018-10-21 PROCEDURE — 83735 ASSAY OF MAGNESIUM: CPT

## 2018-10-21 PROCEDURE — 80048 BASIC METABOLIC PNL TOTAL CA: CPT

## 2018-10-21 PROCEDURE — 2580000003 HC RX 258: Performed by: INTERNAL MEDICINE

## 2018-10-21 PROCEDURE — 6360000002 HC RX W HCPCS: Performed by: INTERNAL MEDICINE

## 2018-10-21 PROCEDURE — 6370000000 HC RX 637 (ALT 250 FOR IP): Performed by: INTERNAL MEDICINE

## 2018-10-21 PROCEDURE — 84550 ASSAY OF BLOOD/URIC ACID: CPT

## 2018-10-21 RX ORDER — ONDANSETRON HYDROCHLORIDE 8 MG/1
8 TABLET, FILM COATED ORAL EVERY 8 HOURS PRN
Status: DISCONTINUED | OUTPATIENT
Start: 2018-10-21 | End: 2018-11-01 | Stop reason: SDUPTHER

## 2018-10-21 RX ORDER — ONDANSETRON 2 MG/ML
8 INJECTION INTRAMUSCULAR; INTRAVENOUS EVERY 8 HOURS PRN
Status: DISCONTINUED | OUTPATIENT
Start: 2018-10-21 | End: 2018-11-01 | Stop reason: SDUPTHER

## 2018-10-21 RX ORDER — PROCHLORPERAZINE MALEATE 10 MG
10 TABLET ORAL EVERY 6 HOURS PRN
Status: DISCONTINUED | OUTPATIENT
Start: 2018-10-21 | End: 2018-10-26

## 2018-10-21 RX ADMIN — Medication 10 ML: at 21:00

## 2018-10-21 RX ADMIN — SODIUM CHLORIDE: 9 INJECTION, SOLUTION INTRAVENOUS at 00:08

## 2018-10-21 RX ADMIN — SODIUM CHLORIDE: 9 INJECTION, SOLUTION INTRAVENOUS at 23:48

## 2018-10-21 RX ADMIN — BUPROPION HYDROCHLORIDE 150 MG: 150 TABLET, FILM COATED, EXTENDED RELEASE ORAL at 09:24

## 2018-10-21 RX ADMIN — SODIUM CHLORIDE: 9 INJECTION, SOLUTION INTRAVENOUS at 10:21

## 2018-10-21 RX ADMIN — BISACODYL 10 MG: 5 TABLET, COATED ORAL at 01:18

## 2018-10-21 RX ADMIN — DOCUSATE SODIUM 100 MG: 100 CAPSULE, LIQUID FILLED ORAL at 09:24

## 2018-10-21 RX ADMIN — DOCUSATE SODIUM 100 MG: 100 CAPSULE, LIQUID FILLED ORAL at 21:29

## 2018-10-21 RX ADMIN — METOCLOPRAMIDE HYDROCHLORIDE 5 MG: 10 TABLET ORAL at 00:20

## 2018-10-21 RX ADMIN — ONDANSETRON 8 MG: 2 INJECTION INTRAMUSCULAR; INTRAVENOUS at 12:52

## 2018-10-21 RX ADMIN — PIPERACILLIN SODIUM,TAZOBACTAM SODIUM 4.5 G: 4; .5 INJECTION, POWDER, FOR SOLUTION INTRAVENOUS at 03:11

## 2018-10-21 RX ADMIN — Medication 10 ML: at 00:11

## 2018-10-21 RX ADMIN — FAMOTIDINE 20 MG: 20 TABLET ORAL at 09:24

## 2018-10-21 RX ADMIN — FLUOXETINE 40 MG: 20 CAPSULE ORAL at 09:24

## 2018-10-21 RX ADMIN — PIPERACILLIN SODIUM,TAZOBACTAM SODIUM 4.5 G: 4; .5 INJECTION, POWDER, FOR SOLUTION INTRAVENOUS at 09:23

## 2018-10-21 RX ADMIN — METOCLOPRAMIDE HYDROCHLORIDE 5 MG: 10 TABLET ORAL at 09:24

## 2018-10-21 RX ADMIN — MAGNESIUM SULFATE HEPTAHYDRATE 4 G: 40 INJECTION, SOLUTION INTRAVENOUS at 06:04

## 2018-10-21 RX ADMIN — Medication 10 ML: at 09:23

## 2018-10-21 RX ADMIN — PIPERACILLIN SODIUM,TAZOBACTAM SODIUM 4.5 G: 4; .5 INJECTION, POWDER, FOR SOLUTION INTRAVENOUS at 15:31

## 2018-10-21 RX ADMIN — DOCUSATE SODIUM 100 MG: 100 CAPSULE, LIQUID FILLED ORAL at 00:20

## 2018-10-21 RX ADMIN — METOCLOPRAMIDE HYDROCHLORIDE 5 MG: 10 TABLET ORAL at 21:28

## 2018-10-21 RX ADMIN — PIPERACILLIN SODIUM,TAZOBACTAM SODIUM 4.5 G: 4; .5 INJECTION, POWDER, FOR SOLUTION INTRAVENOUS at 21:28

## 2018-10-21 RX ADMIN — METOCLOPRAMIDE HYDROCHLORIDE 5 MG: 10 TABLET ORAL at 11:49

## 2018-10-21 RX ADMIN — PANTOPRAZOLE SODIUM 40 MG: 40 TABLET, DELAYED RELEASE ORAL at 09:24

## 2018-10-21 RX ADMIN — METOCLOPRAMIDE HYDROCHLORIDE 5 MG: 10 TABLET ORAL at 17:10

## 2018-10-21 ASSESSMENT — PAIN SCALES - GENERAL
PAINLEVEL_OUTOF10: 0

## 2018-10-21 NOTE — PROGRESS NOTES
4 Eyes Admission Assessment     I agree as the admission nurse that 2 RN's have performed a thorough Head to Toe Skin Assessment on the patient. ALL assessment sites listed below have been assessed on admission. Areas assessed by both nurses:  [x]   Head, Face, and Ears   [x]   Shoulders, Back, and Chest  [x]   Arms, Elbows, and Hands   [x]   Coccyx, Sacrum, and Ischum  [x]   Legs, Feet, and Heels        Does the Patient have Skin Breakdown?   No         Gerald Prevention initiated:  Yes   Wound Care Orders initiated:  No      [facility] nurse consulted for Pressure Injury (Stage 3,4, Unstageable, DTI, NWPT, and Complex wounds):  No      Nurse 1 eSignature: Electronically signed by Jacquelyn Bradford RN on 10/21/18 at 5:03 AM    Nurse 2 eSignature: Radha Zheng RN

## 2018-10-21 NOTE — H&P
FISH:  WNL     3. EGD biopsies (8/29/18):       A. Duodenal biopsy (rule out celiac disease):     - No diagnostic alterations; see Comment.       B. Gastric biopsy (rule out lymphoma):     - No diagnostic alterations; see Comment.       C. Esophageal biopsy (ring):     - No diagnostic alterations; see Comment. COMMENT:  A: Sections contain proximal small bowel mucosa with small  number of Brunner glands, without diagnostic alterations. As  expected/normal for the biopsy location, the villi, where well-presented,  are tall, slender, and slightly scalloped, and stromal plasma cells are  plentiful. B: Sections contain gastric mucosa compatible with antrum/antrum to  fundus, without diagnostic alterations. Helicobacter organisms are not  apparent. C: Sections contain superficial squamous mucosa with small volume of  attached soft tissue, without diagnostic alterations (soft tissue  congestion and recent hemorrhage are noted, possibly related to the  biopsy process). A-C: at each of the biopsied sites, abnormal infiltrates, significant  architectural alterations, viral cytopathy, granulomas, dysplasia, and  malignancy are absent, with special attention paid to the 'Clinical  history' (see below).   BRATI/BRATI    PROBLEM LIST:         1.  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 Depression / Anxiety  3.  GERD  4.  HTN  5. Neutropenic Fever (7/2/18)     6. Ototoxicity     TREATMENT:         1.  CHOEP   Cycle #1 - 6/22/18  Cycle #2 - 7/16/18   2.  DHAP  Cycle #1 - 8/31/18  Cycle #2 - 9/28/18       ASSESSMENT AND PLAN:          1. Refractory PTCL, Stage IVb w/ BM involvement:   - CHOEP x 2 cycles w/ persistent lymphadenopathy on CT scan (refractory disease) (8/22/18)   - EGD Mauro Ruano, 8/29/18) - Negative, no diagnostic alterations   -CT abd/pelvis (9/21/18): Interval decrease in multiple abdominal lymph nodes, persistent splenomegaly.     DHAP  S/p 2 cycles  Dose reduce cisplatin by 50% due to

## 2018-10-21 NOTE — PLAN OF CARE
Problem: Falls - Risk of:  Goal: Will remain free from falls  Will remain free from falls    Outcome: Ongoing   Pt. Is a high falls risk r/t weakness and confusion. Pt. Is up x2 assist. Pt. denies lightheadedness or dizziness. VSS. Falls sign on door. Pt. [employer] understanding of precautions. Pt. Directed to call RN w/ any needs. Pt. Using call light as directed. Will continue to monitor.     Problem: Risk for Impaired Skin Integrity  Goal: Tissue integrity - skin and mucous membranes  Structural intactness and normal physiological function of skin and  mucous membranes. Outcome: Ongoing   Pt with franki score of 17. No signs of skin breakdown; order set in place per protocol. Will continue to monitor.        Problem: Bleeding:  Goal: Will show no signs and symptoms of excessive bleeding  Will show no signs and symptoms of excessive bleeding   Outcome: Ongoing     Patient's hemoglobin this AM:   Recent Labs      10/21/18   0315   HGB  7.2*     Patient's platelet count this AM:   Recent Labs      10/21/18   0315   PLT  108*    Thrombocytopenia Precautions in place. Patient showing no signs or symptoms of active bleeding. Transfusion not indicated at this time. Patient verbalizes understanding of all instructions. Will continue to assess and implement POC. Call light within reach and hourly rounding in place.      Problem: Pain:  Goal: Pain level will decrease  Pain level will decrease   Outcome: Ongoing   No complaints at this time. Will continue to monitor.     Problem: Infection - Central Venous Catheter-Associated Bloodstream Infection:  Goal: Will show no infection signs and symptoms  Will show no infection signs and symptoms   Outcome: Ongoing  CVC site remains free of signs/symptoms of infection. No drainage, edema, erythema, pain, or warmth noted at site. Dressing changes continue per protocol and on an as needed basis - see flowsheet.      Compliant with BCC Bath Protocol:  Performed CHG bath today

## 2018-10-21 NOTE — ED NOTES
Home Med List is complete. Source of medications in list is interview with patient's family member and review of recent fill history.     Patient's family member states that patient had all his am meds today. He also had prn doses of acetaminophen, ranitidine. Family states patient last received chemotherapy 3 weeks ago at [facility].     Please note:  1. Removed from pt's med list: dexamethasone opth, levaquin 500 mg, oxycodone 5 mg IR, KCl 20 mEq, sucralfate 1g/10ml. 2. Added to pt's med list: fluoxetine (patient's family states that patient takes both fluoxetine and bupropion), ranitidine, acetaminophen, and miralax.     Elie Vega  PharmD Candidate 2019  10/20/2018 8:36 PM

## 2018-10-22 ENCOUNTER — APPOINTMENT (OUTPATIENT)
Dept: GENERAL RADIOLOGY | Age: 58
DRG: 177 | End: 2018-10-22
Payer: COMMERCIAL

## 2018-10-22 PROBLEM — E44.0 MODERATE MALNUTRITION (HCC): Chronic | Status: ACTIVE | Noted: 2018-10-22

## 2018-10-22 PROBLEM — E44.0 MODERATE MALNUTRITION (HCC): Status: ACTIVE | Noted: 2018-10-22

## 2018-10-22 LAB
ABO/RH: NORMAL
ALBUMIN SERPL-MCNC: 3.3 G/DL (ref 3.4–5)
ALP BLD-CCNC: 19 U/L (ref 40–129)
ALT SERPL-CCNC: 14 U/L (ref 10–40)
ANION GAP SERPL CALCULATED.3IONS-SCNC: 12 MMOL/L (ref 3–16)
ANISOCYTOSIS: ABNORMAL
ANTIBODY SCREEN: NORMAL
APTT: 27.3 SEC (ref 26–36)
AST SERPL-CCNC: 15 U/L (ref 15–37)
BANDED NEUTROPHILS RELATIVE PERCENT: 2 % (ref 0–7)
BASOPHILS ABSOLUTE: 0 K/UL (ref 0–0.2)
BASOPHILS RELATIVE PERCENT: 0 %
BILIRUB SERPL-MCNC: 0.3 MG/DL (ref 0–1)
BILIRUBIN DIRECT: <0.2 MG/DL (ref 0–0.3)
BILIRUBIN, INDIRECT: ABNORMAL MG/DL (ref 0–1)
BLOOD BANK DISPENSE STATUS: NORMAL
BLOOD BANK PRODUCT CODE: NORMAL
BPU ID: NORMAL
BUN BLDV-MCNC: 17 MG/DL (ref 7–20)
CALCIUM SERPL-MCNC: 8.3 MG/DL (ref 8.3–10.6)
CHLORIDE BLD-SCNC: 96 MMOL/L (ref 99–110)
CO2: 22 MMOL/L (ref 21–32)
CREAT SERPL-MCNC: 0.6 MG/DL (ref 0.9–1.3)
DESCRIPTION BLOOD BANK: NORMAL
DOHLE BODIES: PRESENT
EOSINOPHILS ABSOLUTE: 0 K/UL (ref 0–0.6)
EOSINOPHILS RELATIVE PERCENT: 0 %
GFR AFRICAN AMERICAN: >60
GFR NON-AFRICAN AMERICAN: >60
GLUCOSE BLD-MCNC: 90 MG/DL (ref 70–99)
HCT VFR BLD CALC: 18.3 % (ref 40.5–52.5)
HEMOGLOBIN: 6.6 G/DL (ref 13.5–17.5)
INR BLD: 1.34 (ref 0.86–1.14)
LACTATE DEHYDROGENASE: 237 U/L (ref 100–190)
LYMPHOCYTES ABSOLUTE: 0.4 K/UL (ref 1–5.1)
LYMPHOCYTES RELATIVE PERCENT: 5 %
MACROCYTES: ABNORMAL
MAGNESIUM: 1.4 MG/DL (ref 1.8–2.4)
MCH RBC QN AUTO: 32.1 PG (ref 26–34)
MCHC RBC AUTO-ENTMCNC: 35.7 G/DL (ref 31–36)
MCV RBC AUTO: 90 FL (ref 80–100)
MONOCYTES ABSOLUTE: 0.9 K/UL (ref 0–1.3)
MONOCYTES RELATIVE PERCENT: 11 %
NEUTROPHILS ABSOLUTE: 7.1 K/UL (ref 1.7–7.7)
NEUTROPHILS RELATIVE PERCENT: 82 %
PDW BLD-RTO: 21.3 % (ref 12.4–15.4)
PHOSPHORUS: 3.9 MG/DL (ref 2.5–4.9)
PLATELET # BLD: 91 K/UL (ref 135–450)
PMV BLD AUTO: 6.6 FL (ref 5–10.5)
POLYCHROMASIA: ABNORMAL
POTASSIUM SERPL-SCNC: 3.3 MMOL/L (ref 3.5–5.1)
PROTHROMBIN TIME: 15.3 SEC (ref 9.8–13)
RBC # BLD: 2.04 M/UL (ref 4.2–5.9)
SODIUM BLD-SCNC: 130 MMOL/L (ref 136–145)
TOTAL PROTEIN: 4.9 G/DL (ref 6.4–8.2)
URIC ACID, SERUM: 2 MG/DL (ref 3.5–7.2)
WBC # BLD: 8.4 K/UL (ref 4–11)

## 2018-10-22 PROCEDURE — 86901 BLOOD TYPING SEROLOGIC RH(D): CPT

## 2018-10-22 PROCEDURE — 6370000000 HC RX 637 (ALT 250 FOR IP): Performed by: INTERNAL MEDICINE

## 2018-10-22 PROCEDURE — 83615 LACTATE (LD) (LDH) ENZYME: CPT

## 2018-10-22 PROCEDURE — 86850 RBC ANTIBODY SCREEN: CPT

## 2018-10-22 PROCEDURE — 6360000002 HC RX W HCPCS: Performed by: NURSE PRACTITIONER

## 2018-10-22 PROCEDURE — P9040 RBC LEUKOREDUCED IRRADIATED: HCPCS

## 2018-10-22 PROCEDURE — 84550 ASSAY OF BLOOD/URIC ACID: CPT

## 2018-10-22 PROCEDURE — 86923 COMPATIBILITY TEST ELECTRIC: CPT

## 2018-10-22 PROCEDURE — 36591 DRAW BLOOD OFF VENOUS DEVICE: CPT

## 2018-10-22 PROCEDURE — 6360000002 HC RX W HCPCS: Performed by: INTERNAL MEDICINE

## 2018-10-22 PROCEDURE — 2580000003 HC RX 258: Performed by: INTERNAL MEDICINE

## 2018-10-22 PROCEDURE — 71045 X-RAY EXAM CHEST 1 VIEW: CPT

## 2018-10-22 PROCEDURE — 86900 BLOOD TYPING SEROLOGIC ABO: CPT

## 2018-10-22 PROCEDURE — 85025 COMPLETE CBC W/AUTO DIFF WBC: CPT

## 2018-10-22 PROCEDURE — 6370000000 HC RX 637 (ALT 250 FOR IP): Performed by: NURSE PRACTITIONER

## 2018-10-22 PROCEDURE — 2580000003 HC RX 258: Performed by: NURSE PRACTITIONER

## 2018-10-22 PROCEDURE — 84100 ASSAY OF PHOSPHORUS: CPT

## 2018-10-22 PROCEDURE — 85730 THROMBOPLASTIN TIME PARTIAL: CPT

## 2018-10-22 PROCEDURE — 83735 ASSAY OF MAGNESIUM: CPT

## 2018-10-22 PROCEDURE — 80076 HEPATIC FUNCTION PANEL: CPT

## 2018-10-22 PROCEDURE — 36430 TRANSFUSION BLD/BLD COMPNT: CPT

## 2018-10-22 PROCEDURE — 85610 PROTHROMBIN TIME: CPT

## 2018-10-22 PROCEDURE — 2060000000 HC ICU INTERMEDIATE R&B

## 2018-10-22 PROCEDURE — 80048 BASIC METABOLIC PNL TOTAL CA: CPT

## 2018-10-22 RX ORDER — 0.9 % SODIUM CHLORIDE 0.9 %
250 INTRAVENOUS SOLUTION INTRAVENOUS ONCE
Status: COMPLETED | OUTPATIENT
Start: 2018-10-22 | End: 2018-10-22

## 2018-10-22 RX ORDER — ACETAMINOPHEN 325 MG/1
650 TABLET ORAL EVERY 4 HOURS PRN
Status: DISCONTINUED | OUTPATIENT
Start: 2018-10-22 | End: 2018-11-07

## 2018-10-22 RX ADMIN — METOCLOPRAMIDE HYDROCHLORIDE 5 MG: 10 TABLET ORAL at 12:51

## 2018-10-22 RX ADMIN — POTASSIUM CHLORIDE 20 MEQ: 400 INJECTION, SOLUTION INTRAVENOUS at 09:45

## 2018-10-22 RX ADMIN — DOCUSATE SODIUM 100 MG: 100 CAPSULE, LIQUID FILLED ORAL at 08:51

## 2018-10-22 RX ADMIN — BUPROPION HYDROCHLORIDE 150 MG: 150 TABLET, FILM COATED, EXTENDED RELEASE ORAL at 08:51

## 2018-10-22 RX ADMIN — ENOXAPARIN SODIUM 40 MG: 40 INJECTION SUBCUTANEOUS at 10:52

## 2018-10-22 RX ADMIN — SODIUM CHLORIDE 15 ML: 900 IRRIGANT IRRIGATION at 10:14

## 2018-10-22 RX ADMIN — ACETAMINOPHEN 650 MG: 325 TABLET, FILM COATED ORAL at 14:10

## 2018-10-22 RX ADMIN — SODIUM CHLORIDE 250 ML: 9 INJECTION, SOLUTION INTRAVENOUS at 11:07

## 2018-10-22 RX ADMIN — PANTOPRAZOLE SODIUM 40 MG: 40 TABLET, DELAYED RELEASE ORAL at 07:31

## 2018-10-22 RX ADMIN — Medication 10 ML: at 20:35

## 2018-10-22 RX ADMIN — SODIUM CHLORIDE: 9 INJECTION, SOLUTION INTRAVENOUS at 20:35

## 2018-10-22 RX ADMIN — ACETAMINOPHEN 650 MG: 325 TABLET, FILM COATED ORAL at 18:10

## 2018-10-22 RX ADMIN — FAMOTIDINE 20 MG: 20 TABLET ORAL at 08:51

## 2018-10-22 RX ADMIN — POTASSIUM CHLORIDE 20 MEQ: 400 INJECTION, SOLUTION INTRAVENOUS at 08:41

## 2018-10-22 RX ADMIN — PIPERACILLIN SODIUM,TAZOBACTAM SODIUM 4.5 G: 4; .5 INJECTION, POWDER, FOR SOLUTION INTRAVENOUS at 20:35

## 2018-10-22 RX ADMIN — SODIUM CHLORIDE 15 ML: 900 IRRIGANT IRRIGATION at 11:06

## 2018-10-22 RX ADMIN — FLUOXETINE 40 MG: 20 CAPSULE ORAL at 08:57

## 2018-10-22 RX ADMIN — MAGNESIUM SULFATE HEPTAHYDRATE 4 G: 40 INJECTION, SOLUTION INTRAVENOUS at 05:04

## 2018-10-22 RX ADMIN — Medication 10 ML: at 08:51

## 2018-10-22 RX ADMIN — PIPERACILLIN SODIUM,TAZOBACTAM SODIUM 4.5 G: 4; .5 INJECTION, POWDER, FOR SOLUTION INTRAVENOUS at 03:01

## 2018-10-22 RX ADMIN — ONDANSETRON 8 MG: 2 INJECTION INTRAMUSCULAR; INTRAVENOUS at 12:51

## 2018-10-22 RX ADMIN — SODIUM CHLORIDE 15 ML: 900 IRRIGANT IRRIGATION at 17:59

## 2018-10-22 RX ADMIN — PROCHLORPERAZINE MALEATE 10 MG: 10 TABLET, FILM COATED ORAL at 08:56

## 2018-10-22 RX ADMIN — PIPERACILLIN SODIUM,TAZOBACTAM SODIUM 4.5 G: 4; .5 INJECTION, POWDER, FOR SOLUTION INTRAVENOUS at 15:27

## 2018-10-22 RX ADMIN — POTASSIUM CHLORIDE 20 MEQ: 400 INJECTION, SOLUTION INTRAVENOUS at 05:04

## 2018-10-22 RX ADMIN — METOCLOPRAMIDE HYDROCHLORIDE 5 MG: 10 TABLET ORAL at 08:51

## 2018-10-22 RX ADMIN — PIPERACILLIN SODIUM,TAZOBACTAM SODIUM 4.5 G: 4; .5 INJECTION, POWDER, FOR SOLUTION INTRAVENOUS at 09:45

## 2018-10-22 ASSESSMENT — PAIN SCALES - GENERAL
PAINLEVEL_OUTOF10: 0

## 2018-10-22 NOTE — CARE COORDINATION
Type of Admission  Admitted with n/v, pneumonia  C 2   Day 25 from [facility] to treat PTC lymphoma. Central venous catheter  R SL PAC    Plan  Possible eventual SCT    Update  10/22: Very lethargic today, and \"new disconjugate gaze\". MRI of head and neuro consult ordered. Seen and examined by Neuro CNP. Zosyn continues. Education  10/22: Wife present and aware of plan for today. Discharge  Hope for discharge to home in [place], [place], with wife, Steve Ortiz, as caregiver.      Pending  MRI results

## 2018-10-22 NOTE — PROGRESS NOTES
[facility] Progress Note    10/22/2018     Fredy Puentes    MRN: [id number]    : 1960    Referring MD: No referring provider defined for this encounter. SUBJECTIVE:      ECOG PS:  (2) Ambulatory and capable of self care, unable to carry out work activity, up and about > 50% or waking hours    Isolation: None    Medications    Scheduled Meds:   sodium chloride  250 mL Intravenous Once    GI cocktail   Oral Once    buPROPion  150 mg Oral Daily    metoclopramide  5 mg Oral 4x Daily    pantoprazole  40 mg Oral Daily    docusate sodium  100 mg Oral BID    FLUoxetine  40 mg Oral Daily    famotidine  20 mg Oral Daily    sodium chloride flush  10 mL Intravenous 2 times per day    Saline Mouthwash  15 mL Swish & Spit 4x Daily AC & HS    piperacillin-tazobactam  4.5 g Intravenous Q6H    SMOG Enema   Rectal Once     Continuous Infusions:   sodium chloride      sodium chloride 100 mL/hr at 10/21/18 2348     PRN Meds:.ondansetron **OR** ondansetron, prochlorperazine **OR** prochlorperazine, sodium chloride, sodium chloride flush, potassium chloride, magnesium sulfate, magnesium hydroxide, Saline Mouthwash, alteplase    ROS  · Constitutional: Denies fever, sweats, weight loss. · Eyes: No visual changes or diplopia. No scleral icterus. · ENT: No Headaches, hearing loss or vertigo. No mouth sores or sore throat. · Cardiovascular: No chest pain, dyspnea on exertion, palpitations or loss of consciousness. · Respiratory: No cough or wheezing, no sputum production. No hemoptysis. · Gastrointestinal: No abdominal pain, appetite loss, blood in stools. No change in bowel habits. · Genitourinary: No dysuria, trouble voiding, or hematuria. · Musculoskeletal:  No generalized weakness. No joint complaints. · Integumentary: No rash or pruritis. · Neurological: No headache, diplopia. No change in gait, balance, or coordination. No paresthesias. · Endocrine: No temperature intolerance.  No excessive thirst, fluid intake, or urination. · Hematologic/Lymphatic: No abnormal bruising or ecchymoses, blood clots or swollen lymph nodes. · Allergic/Immunologic: No nasal congestion or hives. Physical Exam:     I&O:    Intake/Output Summary (Last 24 hours) at 10/22/18 0920  Last data filed at 10/22/18 9864   Gross per 24 hour   Intake             2486 ml   Output              550 ml   Net             1936 ml       Vital Signs:  /83   Pulse 86   Temp 99.6 °F (37.6 °C) (Oral)   Resp 20   Ht 6' 1\" (1.854 m)   Wt 175 lb 11.3 oz (79.7 kg)   SpO2 99%   BMI 23.18 kg/m²     Weight:    Wt Readings from Last 3 Encounters:   10/21/18 175 lb 11.3 oz (79.7 kg)   09/30/18 188 lb 4.8 oz (85.4 kg)   09/04/18 174 lb 1.6 oz (79 kg)         General: Awake, alert and oriented.   HEENT: normocephalic, PERRL, no scleral erythema or icterus, Oral mucosa moist and intact, throat clear  NECK: supple without palpable adenopathy  BACK: Straight negative CVAT  SKIN: warm dry and intact without lesions rashes or masses  CHEST: CTA bilaterally without use of accessory muscles  CV: Normal S1 S2, RRR, no MRG  ABD: NT ND normoactive BS, no palpable masses or hepatosplenomegaly  EXTREMITIES: without edema, denies calf tenderness  NEURO: CN II - XII grossly intact  CATHETER: Right PAC (IR , 6/21/18) - No erythema or tenderness    Data    CBC: Recent Labs      10/20/18   1904  10/21/18   0315  10/22/18   0300   WBC  19.9*  17.1*  8.4   HGB  8.3*  7.2*  6.6*   HCT  24.1*  20.6*  18.3*   MCV  90.3  89.8  90.0   PLT  131*  108*  91*     BMP/Mag:  Recent Labs      10/20/18   1904  10/21/18   0315  10/22/18   0300   NA  127*  127*  127*  130*   K  3.7  3.6  3.6  3.3*   CL  90*  89*  90*  96*   CO2  23  23  23  22   PHOS   --   4.7  3.9   BUN  20  17  17  17   CREATININE  0.5*  0.6*  0.6*  0.6*   MG   --   1.00*  1.40*     LIVP:   Recent Labs      10/20/18   1904  10/21/18   0315  10/22/18   0300   AST  20  16  15   ALT  20  16

## 2018-10-22 NOTE — PLAN OF CARE
Problem: Falls - Risk of:  Goal: Will remain free from falls  Will remain free from falls    Outcome: Ongoing    Pt is a High fall risk. See Carmen Furry Fall Score and ABCDS Injury Risk assessments. Explained fall risk precautions to pt and family and rationale behind their use to keep the patient safe. Pt bed is in low position, side rails up, call light and belongings are in reach. Fall wristband applied and present on pts wrist.  Bed alarm on. Pt encouraged to call for assistance. Will continue with hourly rounds for PO intake, pain needs, toileting and repositioning as needed. Problem: Confusion - Acute:  Goal: Absence of continued neurological deterioration signs and symptoms  Absence of continued neurological deterioration signs and symptoms   Outcome: Ongoing  Pt able to make eye contact and turn self in bed. Able to follow commands. Problem: Discharge Planning:  Goal: Ability to perform activities of daily living will improve  Ability to perform activities of daily living will improve   Outcome: Not Met This Shift  Pt verbalizes understanding of discharge plan at this time. Will continue to monitor and provide support. Problem: Injury - Risk of, Physical Injury:  Goal: Will remain free from falls  Will remain free from falls    Outcome: Ongoing    Pt is a High fall risk. See Carmen Furry Fall Score and ABCDS Injury Risk assessments. Explained fall risk precautions to pt and family and rationale behind their use to keep the patient safe. Pt bed is in low position, side rails up, call light and belongings are in reach. Fall wristband applied and present on pts wrist.  Bed alarm on. Pt encouraged to call for assistance. Will continue with hourly rounds for PO intake, pain needs, toileting and repositioning as needed. Problem: Mood - Altered:  Goal: Mood stable  Mood stable   Outcome: Ongoing  Pt calm and cooperative this shift.     Problem: Psychomotor Activity - Altered:  Goal: Absence of psychomotor

## 2018-10-22 NOTE — CONSULTS
Neurology Consult Note  Reason for Consult:\"Lethargy, new disconjugate gaze\"  Chief complaint:\"I'm so tired\"  Dr Hannah Nazario MD asked me to see Bettye Castellanos in consultation for evaluation of \"Lethargy, new disconjugate gaze\"    History of Present Illness:  Bettye Castellanos is a 60 y.o. 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 He was originally admitted on 10/20/18 with abdominal pain, fever, nausea, and vomiting. He was found to be hyponatremic and was admitted to oncology for management and treatment. The  is unable to give me a full history. History is supplemented by the wife at the bedside. The wife tells me that about 5 or 6 days ago she noticed that her  was much more sleepy than his baseline. She states that he seemed to be sleeping almost all day as well as all night. She also tells me that she noticed that both or his eyes seemed to be moving side to side and up and down continuously at times. When she would ask why he was doing that, he stated it was because he was trying to focus. She tells me that it would stop when he started speaking back to her. She also states that she noticed it less when he had his glasses on. She tells me that she noticed these symptoms all of the sudden 5 or 6 days ago and they have been constant since. She does tell me that his random eye movements have lessened since being admitted to the hospital. The wife states his symptoms appear to be severe. The wife also tells me that she has seen him tremor in his arms and legs while he is sleeping. She states that it only lasts a few seconds and usually only occurs in one limb at a time. He denies blurry vision, weakness to one side of his body, feelings of the room spinning, and difficulty speaking.          Medical History:  Past Medical History:   Diagnosis Date    Anxiety     [facility] Abdominal pain. + Vomiting. Genitourinary- No incontinence. No urinary retention  Musculoskeletal- No myalgia. No arthralgia  Skin- No rash. No easy bruising. Psychiatric- No depression. No anxiety  Endocrine- No diabetes. No thyroid issues. Hematologic- + bleeding difficulty. + fatigue  Neurologic- + weakness. + Headache. Exam:  Blood pressure 116/76, pulse 77, temperature 98.4 °F (36.9 °C), temperature source Oral, resp. rate 18, height 6' 1\" (1.854 m), weight 175 lb 11.3 oz (79.7 kg), SpO2 98 %. Constitutional    Vital signs: BP, HR, and RR reviewed   General Very drowsy, no distress, well-nourished  Eyes: unable to visualize the fundi  Cardiovascular: pulses symmetric in all 4 extremities. No peripheral edema. Psychiatric: cooperative with examination, no  psychotic behavior noted. Neurologic  Mental status: Eyes open to voice  orientation to person, tells me he is in [place], tells me it is May, does not attempt to tell me what year it is when asked   General fund of knowledge; tells me the current president is Ventura Marin, tells me the preceding president is John   Memory grossly intact, tells me that Josef was the first president   Attention intact as able to attend well to the exam , can calculate coins   Language fluent in conversation   Comprehension intact; follows simple commands, as well as 2 step commands, however he does not cross the midline  Cranial nerves:   CN2: Visual Fields full w/o extinction on confrontational testing  CN 3,4,6: possible 6th nerve palsy in the right eye, he does have roving eye movements but I am able to break them by speaking to him. Pupils are equal, round, and reactive  CN5: facial sensation symmetric   CN7:face symmetric without dysarthria  CN8: hearing grossly intact  CN9: palate elevated symmetrically  CN11: trap full strength on shoulder shrug  CN12: tongue midline with protrusion  Strength: No pronator drift.   Good strength in all 4 extremities   Deep tendon reflexes: 1+ BUEs, areflexic BLEs  Sensory: light touch intact in all 4 extremities. No sensory extinction on double simultaneous stimulation  Cerebellar/coordination: finger nose finger normal without ataxia  Gait: deferred    Labs  Creatinine: 0.6  Glucose: 90  Na: 130  Hemoglobin: 6.6  Hematocrit: 18.3  Platelets: 91    Blood culture x 2: NGTD    Studies  EKG: Sinus rhythm    Impression:  1. Binocular diplopia  2. Roving eye movements  3. T-cell lymphoma    Peyton Scott is a 60 y.o. male who presented with binocular diplopia, roving eye movements, and increased lethargy. Since being admitted his eye movements have seemed to improve, but he remains lethargic. I am unsure of the etiology of his symptoms at this time.       Recommendations:  -MRI brain w/wo(ordered)  -Recommend EEG  -Will discuss further recommendations with attending neurologist     A copy of this note was provided for MD Yumiko Mirza [address] Neurology

## 2018-10-22 NOTE — PROGRESS NOTES
[facility] Progress Note    10/22/2018     Cameron Jefferson    MRN: [id number]    : 1960    Referring MD: No referring provider defined for this encounter. SUBJECTIVE:  Much more somnolent than last week; deviation of eye movements; not eating well at all    ECOG PS:  (2) Ambulatory and capable of self care, unable to carry out work activity, up and about > 50% or waking hours    Isolation: None    Medications    Scheduled Meds:   sodium chloride  250 mL Intravenous Once    enoxaparin  40 mg Subcutaneous Daily    GI cocktail   Oral Once    buPROPion  150 mg Oral Daily    pantoprazole  40 mg Oral Daily    docusate sodium  100 mg Oral BID    FLUoxetine  40 mg Oral Daily    famotidine  20 mg Oral Daily    sodium chloride flush  10 mL Intravenous 2 times per day    Saline Mouthwash  15 mL Swish & Spit 4x Daily AC & HS    piperacillin-tazobactam  4.5 g Intravenous Q6H    SMOG Enema   Rectal Once     Continuous Infusions:   sodium chloride      sodium chloride 50 mL/hr at 10/22/18 0957     PRN Meds:.acetaminophen, ondansetron **OR** ondansetron, prochlorperazine **OR** prochlorperazine, sodium chloride, sodium chloride flush, potassium chloride, magnesium sulfate, magnesium hydroxide, Saline Mouthwash, alteplase    ROS  · Constitutional: Denies fever, sweats, weight loss. · Eyes: No visual changes or diplopia. No scleral icterus. · ENT: No Headaches, hearing loss or vertigo. No mouth sores or sore throat. · Cardiovascular: No chest pain, dyspnea on exertion, palpitations or loss of consciousness. · Respiratory: No cough or wheezing, no sputum production. No hemoptysis. · Gastrointestinal: No abdominal pain, appetite loss, blood in stools. No change in bowel habits. · Genitourinary: No dysuria, trouble voiding, or hematuria. · Musculoskeletal:  No generalized weakness. No joint complaints. · Integumentary: No rash or pruritis. · Neurological: No headache, diplopia.  No change

## 2018-10-23 ENCOUNTER — APPOINTMENT (OUTPATIENT)
Dept: MRI IMAGING | Age: 58
DRG: 177 | End: 2018-10-23
Payer: COMMERCIAL

## 2018-10-23 LAB
ANION GAP SERPL CALCULATED.3IONS-SCNC: 13 MMOL/L (ref 3–16)
ANISOCYTOSIS: ABNORMAL
BANDED NEUTROPHILS RELATIVE PERCENT: 2 % (ref 0–7)
BASOPHILS ABSOLUTE: 0 K/UL (ref 0–0.2)
BASOPHILS RELATIVE PERCENT: 0 %
BUN BLDV-MCNC: 12 MG/DL (ref 7–20)
CALCIUM SERPL-MCNC: 8.4 MG/DL (ref 8.3–10.6)
CHLORIDE BLD-SCNC: 96 MMOL/L (ref 99–110)
CO2: 21 MMOL/L (ref 21–32)
CREAT SERPL-MCNC: <0.5 MG/DL (ref 0.9–1.3)
EOSINOPHILS ABSOLUTE: 0 K/UL (ref 0–0.6)
EOSINOPHILS RELATIVE PERCENT: 0 %
GFR AFRICAN AMERICAN: >60
GFR NON-AFRICAN AMERICAN: >60
GLUCOSE BLD-MCNC: 94 MG/DL (ref 70–99)
HCT VFR BLD CALC: 23.6 % (ref 40.5–52.5)
HEMOGLOBIN: 8.3 G/DL (ref 13.5–17.5)
LYMPHOCYTES ABSOLUTE: 0.2 K/UL (ref 1–5.1)
LYMPHOCYTES RELATIVE PERCENT: 2 %
MAGNESIUM: 1.4 MG/DL (ref 1.8–2.4)
MCH RBC QN AUTO: 31.6 PG (ref 26–34)
MCHC RBC AUTO-ENTMCNC: 35.1 G/DL (ref 31–36)
MCV RBC AUTO: 90.2 FL (ref 80–100)
METAMYELOCYTES RELATIVE PERCENT: 1 %
MONOCYTES ABSOLUTE: 0.8 K/UL (ref 0–1.3)
MONOCYTES RELATIVE PERCENT: 8 %
NEUTROPHILS ABSOLUTE: 8.6 K/UL (ref 1.7–7.7)
NEUTROPHILS RELATIVE PERCENT: 87 %
PDW BLD-RTO: 20.1 % (ref 12.4–15.4)
PLATELET # BLD: 82 K/UL (ref 135–450)
PMV BLD AUTO: 6.6 FL (ref 5–10.5)
POLYCHROMASIA: ABNORMAL
POTASSIUM SERPL-SCNC: 3.4 MMOL/L (ref 3.5–5.1)
RBC # BLD: 2.62 M/UL (ref 4.2–5.9)
SODIUM BLD-SCNC: 130 MMOL/L (ref 136–145)
TEAR DROP CELLS: ABNORMAL
WBC # BLD: 9.5 K/UL (ref 4–11)

## 2018-10-23 PROCEDURE — 2580000003 HC RX 258: Performed by: NURSE PRACTITIONER

## 2018-10-23 PROCEDURE — 87015 SPECIMEN INFECT AGNT CONCNTJ: CPT

## 2018-10-23 PROCEDURE — 6360000004 HC RX CONTRAST MEDICATION: Performed by: INTERNAL MEDICINE

## 2018-10-23 PROCEDURE — A9579 GAD-BASE MR CONTRAST NOS,1ML: HCPCS | Performed by: INTERNAL MEDICINE

## 2018-10-23 PROCEDURE — 94760 N-INVAS EAR/PLS OXIMETRY 1: CPT

## 2018-10-23 PROCEDURE — 87206 SMEAR FLUORESCENT/ACID STAI: CPT

## 2018-10-23 PROCEDURE — 36591 DRAW BLOOD OFF VENOUS DEVICE: CPT

## 2018-10-23 PROCEDURE — 87102 FUNGUS ISOLATION CULTURE: CPT

## 2018-10-23 PROCEDURE — 6360000002 HC RX W HCPCS: Performed by: NURSE PRACTITIONER

## 2018-10-23 PROCEDURE — 83735 ASSAY OF MAGNESIUM: CPT

## 2018-10-23 PROCEDURE — 6370000000 HC RX 637 (ALT 250 FOR IP): Performed by: INTERNAL MEDICINE

## 2018-10-23 PROCEDURE — 87205 SMEAR GRAM STAIN: CPT

## 2018-10-23 PROCEDURE — 80048 BASIC METABOLIC PNL TOTAL CA: CPT

## 2018-10-23 PROCEDURE — 2060000000 HC ICU INTERMEDIATE R&B

## 2018-10-23 PROCEDURE — 87116 MYCOBACTERIA CULTURE: CPT

## 2018-10-23 PROCEDURE — 70553 MRI BRAIN STEM W/O & W/DYE: CPT

## 2018-10-23 PROCEDURE — 86592 SYPHILIS TEST NON-TREP QUAL: CPT

## 2018-10-23 PROCEDURE — 6360000002 HC RX W HCPCS: Performed by: INTERNAL MEDICINE

## 2018-10-23 PROCEDURE — 2580000003 HC RX 258: Performed by: INTERNAL MEDICINE

## 2018-10-23 PROCEDURE — 85025 COMPLETE CBC W/AUTO DIFF WBC: CPT

## 2018-10-23 PROCEDURE — 6370000000 HC RX 637 (ALT 250 FOR IP): Performed by: NURSE PRACTITIONER

## 2018-10-23 PROCEDURE — 89051 BODY FLUID CELL COUNT: CPT

## 2018-10-23 PROCEDURE — 87529 HSV DNA AMP PROBE: CPT

## 2018-10-23 RX ORDER — THIAMINE HYDROCHLORIDE 100 MG/ML
500 INJECTION, SOLUTION INTRAMUSCULAR; INTRAVENOUS 3 TIMES DAILY
Status: DISCONTINUED | OUTPATIENT
Start: 2018-10-23 | End: 2018-10-23 | Stop reason: CLARIF

## 2018-10-23 RX ORDER — THIAMINE HYDROCHLORIDE 100 MG/ML
100 INJECTION, SOLUTION INTRAMUSCULAR; INTRAVENOUS DAILY
Status: DISCONTINUED | OUTPATIENT
Start: 2018-10-27 | End: 2018-10-23 | Stop reason: CLARIF

## 2018-10-23 RX ADMIN — ENOXAPARIN SODIUM 40 MG: 40 INJECTION SUBCUTANEOUS at 08:50

## 2018-10-23 RX ADMIN — PIPERACILLIN SODIUM,TAZOBACTAM SODIUM 4.5 G: 4; .5 INJECTION, POWDER, FOR SOLUTION INTRAVENOUS at 09:12

## 2018-10-23 RX ADMIN — Medication 10 ML: at 08:50

## 2018-10-23 RX ADMIN — ACETAMINOPHEN 650 MG: 325 TABLET, FILM COATED ORAL at 16:05

## 2018-10-23 RX ADMIN — ONDANSETRON 8 MG: 2 INJECTION INTRAMUSCULAR; INTRAVENOUS at 08:39

## 2018-10-23 RX ADMIN — ONDANSETRON 8 MG: 2 INJECTION INTRAMUSCULAR; INTRAVENOUS at 00:09

## 2018-10-23 RX ADMIN — PIPERACILLIN SODIUM,TAZOBACTAM SODIUM 4.5 G: 4; .5 INJECTION, POWDER, FOR SOLUTION INTRAVENOUS at 03:19

## 2018-10-23 RX ADMIN — FAMOTIDINE 20 MG: 20 TABLET ORAL at 08:37

## 2018-10-23 RX ADMIN — PIPERACILLIN SODIUM,TAZOBACTAM SODIUM 4.5 G: 4; .5 INJECTION, POWDER, FOR SOLUTION INTRAVENOUS at 15:59

## 2018-10-23 RX ADMIN — THIAMINE HYDROCHLORIDE 500 MG: 100 INJECTION, SOLUTION INTRAMUSCULAR; INTRAVENOUS at 20:02

## 2018-10-23 RX ADMIN — Medication 10 ML: at 20:03

## 2018-10-23 RX ADMIN — POTASSIUM CHLORIDE 20 MEQ: 400 INJECTION, SOLUTION INTRAVENOUS at 10:18

## 2018-10-23 RX ADMIN — POTASSIUM CHLORIDE 20 MEQ: 400 INJECTION, SOLUTION INTRAVENOUS at 07:59

## 2018-10-23 RX ADMIN — PIPERACILLIN SODIUM,TAZOBACTAM SODIUM 4.5 G: 4; .5 INJECTION, POWDER, FOR SOLUTION INTRAVENOUS at 21:13

## 2018-10-23 RX ADMIN — FLUOXETINE 40 MG: 20 CAPSULE ORAL at 08:37

## 2018-10-23 RX ADMIN — GADOTERIDOL 17 ML: 279.3 INJECTION, SOLUTION INTRAVENOUS at 15:40

## 2018-10-23 RX ADMIN — SODIUM CHLORIDE: 9 INJECTION, SOLUTION INTRAVENOUS at 23:20

## 2018-10-23 RX ADMIN — BUPROPION HYDROCHLORIDE 150 MG: 150 TABLET, FILM COATED, EXTENDED RELEASE ORAL at 08:37

## 2018-10-23 RX ADMIN — ONDANSETRON 8 MG: 2 INJECTION INTRAMUSCULAR; INTRAVENOUS at 20:14

## 2018-10-23 RX ADMIN — MAGNESIUM SULFATE HEPTAHYDRATE 4 G: 40 INJECTION, SOLUTION INTRAVENOUS at 05:11

## 2018-10-23 RX ADMIN — POTASSIUM CHLORIDE 20 MEQ: 400 INJECTION, SOLUTION INTRAVENOUS at 05:11

## 2018-10-23 RX ADMIN — PANTOPRAZOLE SODIUM 40 MG: 40 TABLET, DELAYED RELEASE ORAL at 07:36

## 2018-10-23 RX ADMIN — THIAMINE HYDROCHLORIDE 500 MG: 100 INJECTION, SOLUTION INTRAMUSCULAR; INTRAVENOUS at 14:09

## 2018-10-23 RX ADMIN — POTASSIUM CHLORIDE 20 MEQ: 400 INJECTION, SOLUTION INTRAVENOUS at 06:44

## 2018-10-23 ASSESSMENT — PAIN SCALES - GENERAL
PAINLEVEL_OUTOF10: 0

## 2018-10-23 ASSESSMENT — PAIN SCALES - WONG BAKER
WONGBAKER_NUMERICALRESPONSE: 0

## 2018-10-23 NOTE — PLAN OF CARE
Problem: Falls - Risk of:  Goal: Will remain free from falls  Will remain free from falls    Outcome: Ongoing    Unstable:   pt bed alarm on today. Pt call light within reach. Pt lethargic today and very weak. Due to pts condition, physical activity not encouraged by nursing at this time. Pt able to reposition self at times. Using pillow support. Problem: Risk for Impaired Skin Integrity  Goal: Tissue integrity - skin and mucous membranes  Structural intactness and normal physiological function of skin and  mucous membranes. Outcome: Met This Shift  Skin clean dry intact. Pt having times of incontinence of urine. Pt becomes very lethargic and unable to know when has to use urinal. Checking with patient frequently to see if have to urinate. Pt pulled off condom catheter placed yesterday. Will monitor. Problem: Confusion - Acute:  Goal: Absence of continued neurological deterioration signs and symptoms  Absence of continued neurological deterioration signs and symptoms   Outcome: Ongoing  Pt orientated to person. Pt having double vision with periods of roaming eye movements. Pt given eye patch for eye. Neurology consulted. MRI ordered. Talked with MRI about procedure today. Unsure of time. Pt alert for short periods then becomes very lethargic and hard to arouse. Will monitor. Problem: Injury - Risk of, Physical Injury:  Goal: Will remain free from falls  Will remain free from falls    Outcome: Ongoing    Unstable:   pt bed alarm on today. Pt call light within reach. Pt lethargic today and very weak. Due to pts condition, physical activity not encouraged by nursing at this time.     Problem: Bleeding:  Goal: Will show no signs and symptoms of excessive bleeding  Will show no signs and symptoms of excessive bleeding   Outcome: Ongoing  Patient's hemoglobin this AM:   Recent Labs      10/23/18   0325   HGB  8.3*     Patient's platelet count this AM:   Recent Labs      10/23/18   0325   PLT  82* Thrombocytopenia Precautions in place. Patient showing no signs or symptoms of active bleeding. Transfusion not needed today. Patient verbalizes understanding of all instructions. Will continue to assess and implement POC. Call light within reach and hourly rounding in place. Problem: Nutrition  Goal: Optimal nutrition therapy  Outcome: Ongoing  Encourage small bites when patient is awake.  Pt able to eat most of breakfast.

## 2018-10-23 NOTE — PLAN OF CARE
Patient verbalizes understanding of all instructions. Will continue to assess and implement POC. Call light within reach and hourly rounding in place. Problem: Nutrition  Goal: Optimal nutrition therapy  Outcome: Ongoing  Encourage small bites when patient is awake.

## 2018-10-24 ENCOUNTER — APPOINTMENT (OUTPATIENT)
Dept: GENERAL RADIOLOGY | Age: 58
DRG: 177 | End: 2018-10-24
Payer: COMMERCIAL

## 2018-10-24 PROBLEM — G04.90 ENCEPHALITIS: Status: ACTIVE | Noted: 2018-10-24

## 2018-10-24 LAB
ALBUMIN SERPL-MCNC: 2.7 G/DL (ref 3.4–5)
ALP BLD-CCNC: 19 U/L (ref 40–129)
ALT SERPL-CCNC: 13 U/L (ref 10–40)
ANION GAP SERPL CALCULATED.3IONS-SCNC: 11 MMOL/L (ref 3–16)
APPEARANCE CSF: CLEAR
APPEARANCE CSF: CLEAR
AST SERPL-CCNC: 16 U/L (ref 15–37)
ATYPICAL LYMPHS CSF: 78 %
ATYPICAL LYMPHS CSF: 79 %
BASOPHILS ABSOLUTE: 0 K/UL (ref 0–0.2)
BASOPHILS RELATIVE PERCENT: 0.3 %
BILIRUB SERPL-MCNC: 0.4 MG/DL (ref 0–1)
BILIRUBIN DIRECT: <0.2 MG/DL (ref 0–0.3)
BILIRUBIN, INDIRECT: ABNORMAL MG/DL (ref 0–1)
BUN BLDV-MCNC: 13 MG/DL (ref 7–20)
CALCIUM SERPL-MCNC: 8.4 MG/DL (ref 8.3–10.6)
CHLORIDE BLD-SCNC: 95 MMOL/L (ref 99–110)
CHLORIDE URINE RANDOM: 208 MMOL/L
CLOT EVALUATION CSF: ABNORMAL
CLOT EVALUATION CSF: ABNORMAL
CO2: 22 MMOL/L (ref 21–32)
COLOR CSF: COLORLESS
COLOR CSF: COLORLESS
CREAT SERPL-MCNC: <0.5 MG/DL (ref 0.9–1.3)
CRYPTOCOCCAL ANTIGEN: NEGATIVE
EOSINOPHILS ABSOLUTE: 0 K/UL (ref 0–0.6)
EOSINOPHILS RELATIVE PERCENT: 0.3 %
GFR AFRICAN AMERICAN: >60
GFR NON-AFRICAN AMERICAN: >60
GLUCOSE BLD-MCNC: 87 MG/DL (ref 70–99)
GLUCOSE, CSF: 21 MG/DL (ref 40–80)
HCT VFR BLD CALC: 24.1 % (ref 40.5–52.5)
HEMOGLOBIN: 8.3 G/DL (ref 13.5–17.5)
INR BLD: 1.32 (ref 0.86–1.14)
LACTATE DEHYDROGENASE: 296 U/L (ref 100–190)
LYMPHOCYTES ABSOLUTE: 0.2 K/UL (ref 1–5.1)
LYMPHOCYTES RELATIVE PERCENT: 3.3 %
LYMPHS CSF: 19 % (ref 40–80)
LYMPHS CSF: 20 % (ref 40–80)
MAGNESIUM: 1.3 MG/DL (ref 1.8–2.4)
MCH RBC QN AUTO: 31.5 PG (ref 26–34)
MCHC RBC AUTO-ENTMCNC: 34.5 G/DL (ref 31–36)
MCV RBC AUTO: 91.2 FL (ref 80–100)
MENINGITIS ENCEPHALITIS PANEL: NORMAL
MONOCYTE, CSF: 2 % (ref 15–45)
MONOCYTES ABSOLUTE: 0.8 K/UL (ref 0–1.3)
MONOCYTES RELATIVE PERCENT: 11.6 %
MONONUCLEAR UNIDENTIFIED CELLS, CSF: 2 %
NEUTROPHILS ABSOLUTE: 5.9 K/UL (ref 1.7–7.7)
NEUTROPHILS RELATIVE PERCENT: 84.5 %
NUMBER OF CELLS CSF: 100
PDW BLD-RTO: 20.2 % (ref 12.4–15.4)
PHOSPHORUS: 3.5 MG/DL (ref 2.5–4.9)
PLATELET # BLD: 79 K/UL (ref 135–450)
PMV BLD AUTO: 7 FL (ref 5–10.5)
POTASSIUM SERPL-SCNC: 3.6 MMOL/L (ref 3.5–5.1)
PROTEIN CSF: 106 MG/DL (ref 15–45)
PROTEIN PROTEIN: 30.2 MG/DL
PROTHROMBIN TIME: 15 SEC (ref 9.8–13)
RBC # BLD: 2.64 M/UL (ref 4.2–5.9)
RBC CSF: 3 /CUMM
RBC CSF: 50 /CUMM
REPORT: NORMAL
SLIDE REVIEW: ABNORMAL
SODIUM BLD-SCNC: 128 MMOL/L (ref 136–145)
SODIUM URINE: 179 MMOL/L
TOTAL PROTEIN: 5.1 G/DL (ref 6.4–8.2)
TUBE NUMBER CSF: ABNORMAL
TUBE NUMBER CSF: ABNORMAL
URIC ACID, SERUM: 1.4 MG/DL (ref 3.5–7.2)
VRE CULTURE: NORMAL
WBC # BLD: 7 K/UL (ref 4–11)
WBC CSF: 103 /CUMM (ref 0–5)
WBC CSF: 73 /CUMM (ref 0–5)

## 2018-10-24 PROCEDURE — 84156 ASSAY OF PROTEIN URINE: CPT

## 2018-10-24 PROCEDURE — 6370000000 HC RX 637 (ALT 250 FOR IP): Performed by: INTERNAL MEDICINE

## 2018-10-24 PROCEDURE — 82945 GLUCOSE OTHER FLUID: CPT

## 2018-10-24 PROCEDURE — 87483 CNS DNA AMP PROBE TYPE 12-25: CPT

## 2018-10-24 PROCEDURE — 83615 LACTATE (LD) (LDH) ENZYME: CPT

## 2018-10-24 PROCEDURE — 86480 TB TEST CELL IMMUN MEASURE: CPT

## 2018-10-24 PROCEDURE — 6360000002 HC RX W HCPCS: Performed by: NURSE PRACTITIONER

## 2018-10-24 PROCEDURE — 80076 HEPATIC FUNCTION PANEL: CPT

## 2018-10-24 PROCEDURE — 82040 ASSAY OF SERUM ALBUMIN: CPT

## 2018-10-24 PROCEDURE — 82436 ASSAY OF URINE CHLORIDE: CPT

## 2018-10-24 PROCEDURE — 84550 ASSAY OF BLOOD/URIC ACID: CPT

## 2018-10-24 PROCEDURE — 6360000002 HC RX W HCPCS: Performed by: INTERNAL MEDICINE

## 2018-10-24 PROCEDURE — 36591 DRAW BLOOD OFF VENOUS DEVICE: CPT

## 2018-10-24 PROCEDURE — 85610 PROTHROMBIN TIME: CPT

## 2018-10-24 PROCEDURE — 87205 SMEAR GRAM STAIN: CPT

## 2018-10-24 PROCEDURE — 87070 CULTURE OTHR SPECIMN AEROBIC: CPT

## 2018-10-24 PROCEDURE — 80048 BASIC METABOLIC PNL TOTAL CA: CPT

## 2018-10-24 PROCEDURE — 87529 HSV DNA AMP PROBE: CPT

## 2018-10-24 PROCEDURE — 84100 ASSAY OF PHOSPHORUS: CPT

## 2018-10-24 PROCEDURE — 87798 DETECT AGENT NOS DNA AMP: CPT

## 2018-10-24 PROCEDURE — 2500000003 HC RX 250 WO HCPCS: Performed by: INTERNAL MEDICINE

## 2018-10-24 PROCEDURE — 83520 IMMUNOASSAY QUANT NOS NONAB: CPT

## 2018-10-24 PROCEDURE — 2580000003 HC RX 258: Performed by: INTERNAL MEDICINE

## 2018-10-24 PROCEDURE — 83519 RIA NONANTIBODY: CPT

## 2018-10-24 PROCEDURE — 83735 ASSAY OF MAGNESIUM: CPT

## 2018-10-24 PROCEDURE — 2580000003 HC RX 258: Performed by: NURSE PRACTITIONER

## 2018-10-24 PROCEDURE — 2060000000 HC ICU INTERMEDIATE R&B

## 2018-10-24 PROCEDURE — 62270 DX LMBR SPI PNXR: CPT

## 2018-10-24 PROCEDURE — 88112 CYTOPATH CELL ENHANCE TECH: CPT

## 2018-10-24 PROCEDURE — 86255 FLUORESCENT ANTIBODY SCREEN: CPT

## 2018-10-24 PROCEDURE — 82784 ASSAY IGA/IGD/IGG/IGM EACH: CPT

## 2018-10-24 PROCEDURE — 89050 BODY FLUID CELL COUNT: CPT

## 2018-10-24 PROCEDURE — 84300 ASSAY OF URINE SODIUM: CPT

## 2018-10-24 PROCEDURE — 85025 COMPLETE CBC W/AUTO DIFF WBC: CPT

## 2018-10-24 PROCEDURE — 87496 CYTOMEG DNA AMP PROBE: CPT

## 2018-10-24 PROCEDURE — 82570 ASSAY OF URINE CREATININE: CPT

## 2018-10-24 PROCEDURE — 009U3ZX DRAINAGE OF SPINAL CANAL, PERCUTANEOUS APPROACH, DIAGNOSTIC: ICD-10-PCS | Performed by: RADIOLOGY

## 2018-10-24 PROCEDURE — 83916 OLIGOCLONAL BANDS: CPT

## 2018-10-24 PROCEDURE — 82042 OTHER SOURCE ALBUMIN QUAN EA: CPT

## 2018-10-24 PROCEDURE — 86341 ISLET CELL ANTIBODY: CPT

## 2018-10-24 PROCEDURE — 99255 IP/OBS CONSLTJ NEW/EST HI 80: CPT | Performed by: INTERNAL MEDICINE

## 2018-10-24 PROCEDURE — 84157 ASSAY OF PROTEIN OTHER: CPT

## 2018-10-24 PROCEDURE — 87899 AGENT NOS ASSAY W/OPTIC: CPT

## 2018-10-24 RX ORDER — MAGNESIUM SULFATE HEPTAHYDRATE 500 MG/ML
1 INJECTION, SOLUTION INTRAMUSCULAR; INTRAVENOUS ONCE
Status: DISCONTINUED | OUTPATIENT
Start: 2018-10-25 | End: 2018-10-24

## 2018-10-24 RX ORDER — MAGNESIUM SULFATE 1 G/100ML
1 INJECTION INTRAVENOUS ONCE
Status: COMPLETED | OUTPATIENT
Start: 2018-10-25 | End: 2018-10-25

## 2018-10-24 RX ADMIN — THIAMINE HYDROCHLORIDE 500 MG: 100 INJECTION, SOLUTION INTRAMUSCULAR; INTRAVENOUS at 14:33

## 2018-10-24 RX ADMIN — PANTOPRAZOLE SODIUM 40 MG: 40 TABLET, DELAYED RELEASE ORAL at 08:45

## 2018-10-24 RX ADMIN — FAMOTIDINE 20 MG: 20 TABLET ORAL at 08:36

## 2018-10-24 RX ADMIN — DOCUSATE SODIUM 100 MG: 100 CAPSULE, LIQUID FILLED ORAL at 08:45

## 2018-10-24 RX ADMIN — ACYCLOVIR SODIUM 800 MG: 50 INJECTION, SOLUTION INTRAVENOUS at 13:17

## 2018-10-24 RX ADMIN — ACYCLOVIR SODIUM 800 MG: 50 INJECTION, SOLUTION INTRAVENOUS at 20:34

## 2018-10-24 RX ADMIN — BUPROPION HYDROCHLORIDE 150 MG: 150 TABLET, FILM COATED, EXTENDED RELEASE ORAL at 08:45

## 2018-10-24 RX ADMIN — MAGNESIUM SULFATE HEPTAHYDRATE 4 G: 40 INJECTION, SOLUTION INTRAVENOUS at 06:36

## 2018-10-24 RX ADMIN — PIPERACILLIN SODIUM,TAZOBACTAM SODIUM 4.5 G: 4; .5 INJECTION, POWDER, FOR SOLUTION INTRAVENOUS at 03:08

## 2018-10-24 RX ADMIN — LIDOCAINE HYDROCHLORIDE 5 ML: 10 INJECTION, SOLUTION EPIDURAL; INFILTRATION; INTRACAUDAL; PERINEURAL at 12:06

## 2018-10-24 RX ADMIN — Medication 10 ML: at 20:34

## 2018-10-24 RX ADMIN — CEFTRIAXONE SODIUM 2 G: 2 INJECTION, POWDER, FOR SOLUTION INTRAMUSCULAR; INTRAVENOUS at 15:39

## 2018-10-24 RX ADMIN — THIAMINE HYDROCHLORIDE 500 MG: 100 INJECTION, SOLUTION INTRAMUSCULAR; INTRAVENOUS at 21:53

## 2018-10-24 RX ADMIN — PIPERACILLIN SODIUM,TAZOBACTAM SODIUM 4.5 G: 4; .5 INJECTION, POWDER, FOR SOLUTION INTRAVENOUS at 08:44

## 2018-10-24 RX ADMIN — VANCOMYCIN HYDROCHLORIDE 1500 MG: 10 INJECTION, POWDER, LYOPHILIZED, FOR SOLUTION INTRAVENOUS at 16:13

## 2018-10-24 RX ADMIN — ONDANSETRON 8 MG: 2 INJECTION INTRAMUSCULAR; INTRAVENOUS at 08:36

## 2018-10-24 RX ADMIN — Medication 10 ML: at 08:45

## 2018-10-24 RX ADMIN — THIAMINE HYDROCHLORIDE 500 MG: 100 INJECTION, SOLUTION INTRAMUSCULAR; INTRAVENOUS at 08:49

## 2018-10-24 RX ADMIN — FLUOXETINE 40 MG: 20 CAPSULE ORAL at 08:49

## 2018-10-24 ASSESSMENT — PAIN SCALES - GENERAL
PAINLEVEL_OUTOF10: 0
PAINLEVEL_OUTOF10: 0
PAINLEVEL_OUTOF10: 5
PAINLEVEL_OUTOF10: 0

## 2018-10-24 ASSESSMENT — PAIN SCALES - WONG BAKER
WONGBAKER_NUMERICALRESPONSE: 0

## 2018-10-24 NOTE — PLAN OF CARE
Pt has orders for prophylactic Lovenox. Pt verbalizes understanding of need for prophylaxis while inpatient.

## 2018-10-24 NOTE — CONSULTS
13   CREATININE  0.6*  <0.5*  <0.5*   GLUCOSE  90  94  87     Ca/Mg/Phos: Recent Labs      10/22/18   0300  10/23/18   0325  10/24/18   0300   CALCIUM  8.3  8.4  8.4   MG  1.40*  1.40*  1.30*   PHOS  3.9   --   3.5     Hepatic: Recent Labs      10/22/18   0300  10/24/18   0300   AST  15  16   ALT  14  13   BILITOT  0.3  0.4   ALKPHOS  19*  19*     Troponin: No results for input(s): TROPONINI in the last 72 hours. BNP: No results for input(s): BNP in the last 72 hours. Lipids: No results for input(s): CHOL, TRIG, HDL, LDLCALC, LABVLDL in the last 72 hours. ABGs: No results for input(s): PHART, PO2ART, IAU3XMO in the last 72 hours. INR:   Recent Labs      10/22/18   0300  10/24/18   0300   INR  1.34*  1.32*     UA:No results for input(s): [place], GLUCOSEU, BILIRUBINUR, Sarah Raid, SPECGRAV, BLOODU, PHUR, PROTEINU, UROBILINOGEN, NITRU, LEUKOCYTESUR, [place] Letters in the last 72 hours. Urine Microscopic: No results for input(s): LABCAST, BACTERIA, COMU, HYALCAST, WBCUA, RBCUA, EPIU in the last 72 hours. Urine Culture: No results for input(s): LABURIN in the last 72 hours. Urine Chemistry: Recent Labs      10/24/18   2030   CLUR  208   PROTEINUR  30.20*   NAUR  179             IMAGING:  FL LUMBAR PUNCTURE DIAG   Final Result   1. Technically successful fluoroscopically guided diagnostic lumbar puncture at the L3-L4 level as described. Opening pressure is measured as 15 cm of water. This is normal.      MRI BRAIN W WO CONTRAST   Final Result      1. Markedly abnormal MR imaging appearance of the thalamic and brainstem structures, including involvement of the trena, left greater than right midbrain and left greater than right thalamic regions. There is some subtle diffusion restriction suggested in    the left thalamus, and there is also some subtle enhancement, ill-defined, involving the left thalamus, left midbrain and left cerebral peduncle region.   Appearance is nonspecific, although the 2 main

## 2018-10-24 NOTE — PROGRESS NOTES
complaints. · Integumentary: No rash or pruritis. · Neurological: No headache, diplopia. No change in gait, balance, or coordination. No paresthesias. · Endocrine: No temperature intolerance. No excessive thirst, fluid intake, or urination. · Hematologic/Lymphatic: No abnormal bruising or ecchymoses, blood clots or swollen lymph nodes. · Allergic/Immunologic: No nasal congestion or hives. Physical Exam:     I&O:      Intake/Output Summary (Last 24 hours) at 10/24/18 1243  Last data filed at 10/24/18 0949   Gross per 24 hour   Intake             1769 ml   Output              800 ml   Net              969 ml       Vital Signs:  /83   Pulse 88   Temp 99 °F (37.2 °C) (Oral)   Resp 16   Ht 6' 1\" (1.854 m)   Wt 184 lb 8.4 oz (83.7 kg)   SpO2 99%   BMI 24.35 kg/m²     Weight:    Wt Readings from Last 3 Encounters:   10/24/18 184 lb 8.4 oz (83.7 kg)   09/30/18 188 lb 4.8 oz (85.4 kg)   09/04/18 174 lb 1.6 oz (79 kg)         General: Awake, alert and oriented.   HEENT: normocephalic, PERRL, no scleral erythema or icterus, Oral mucosa moist and intact, throat clear  NECK: supple without palpable adenopathy  BACK: Straight negative CVAT  SKIN: warm dry and intact without lesions rashes or masses  CHEST: CTA bilaterally without use of accessory muscles  CV: Normal S1 S2, RRR, no MRG  ABD: NT ND normoactive BS, no palpable masses or hepatosplenomegaly  EXTREMITIES: without edema, denies calf tenderness  NEURO: CN II - XII grossly intact  CATHETER: Right PAC (IR , 6/21/18) - No erythema or tenderness    Data    CBC:   Recent Labs      10/22/18   0300  10/23/18   0325  10/24/18   0300   WBC  8.4  9.5  7.0   HGB  6.6*  8.3*  8.3*   HCT  18.3*  23.6*  24.1*   MCV  90.0  90.2  91.2   PLT  91*  82*  79*     BMP/Mag:  Recent Labs      10/22/18   0300  10/23/18   0325  10/24/18   0300   NA  130*  130*  128*   K  3.3*  3.4*  3.6   CL  96*  96*  95*   CO2  22  21  22   PHOS  3.9   --   3.5   BUN  17  12  13

## 2018-10-24 NOTE — CONSULTS
MRI:  Markedly abnormal MR imaging appearance of the thalamic and brainstem structures, including involvement of the trena, left greater than right midbrain and left greater than right thalamic regions. There is some subtle diffusion restriction suggested in    the left thalamus, and there is also some subtle enhancement, ill-defined, involving the left thalamus, left midbrain and left cerebral peduncle region.  Appearance is nonspecific, although the 2 main differential diagnoses would include posterior    reversible encephalopathy syndrome (PA ES), which can be associated with chemotherapeutic agents, with additional differential diagnostic consideration that of osmotic demyelination. Correlate with any pertinent electrolyte imbalances. 10/22 CXR:   'Lungs are clear without active pulmonary opacities or effusion. '    10/21 Chest x-ray   Streaky bilateral opacities likely relates to atelectasis and/or pneumonitis         10/21 CT A/P   No acute intra-abdominal or pelvic pathology.       Multiple enlarged mesenteric lymph nodes and an enlarged spleen similar to prior examination.       Small to moderate hiatal hernia       High density material in the gallbladder likely sludge and/or small stones.       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

## 2018-10-24 NOTE — PROGRESS NOTES
Latest Ref Range: Negative  Negative       Results for Lisset Patten (MRN [id number]) as of 10/24/2018 10:44   Ref. Range 10/24/2018 07:43   [employer] Latest Ref Range: Negative  Negative         Studies:    MRI of the brain: 10/23/2018  Markedly abnormal MR imaging appearance of the thalamic and brainstem structures, including involvement of the trena, left greater than right midbrain and left greater than right thalamic regions. There is some subtle diffusion restriction suggested in   the left thalamus, and there is also some subtle enhancement, ill-defined, involving the left thalamus, left midbrain and left cerebral peduncle region.           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 Left CN palsy  6th suggesting non-localizing 6th but mid/upper brainstem is worrisome as are ICP issues from meningitis (malignant as well as infectious). MRI with T2 hyperintensity throughout the brainstem into the left BG without much anita+. ddx is including nutritional/metabolic, infectious, and inflammatory with malignancy less likely but excluded. Infectious is most worrisome. 1. Encephalopathy  2. Diplopia  3. Pneumonia  4. T cell lymphoma    Recommendations:  -Continue thiamine replacement (500 tid IV for 3 days followed by 100 daily)    -Replete electrolyte imbalances as per Primary medical team.     -LP for CSF for opening pressure, viral, fungal, atypical bacteria(TB, listeria, whipples,ect), and cytology/flow cytometry, paraneoplastic panel, MARIELA virus.     -Check Quantiferon gold. -He is on Zosyn. If ok with Oncology team should be started on Acyclovir 10mg/kg IV q8h until CSF HSV PCR  studies resulted. (will defer to Oncology team to initiate, patients Primary CAROLINE Rausch to inform the team).         Case discussed with the patients daughter  who is currently at

## 2018-10-25 LAB
ALBUMIN CSF: 63.5 MG/DL (ref 10–30)
ALBUMIN SERPL-MCNC: 2535 MG/DL (ref 3400–5000)
ANION GAP SERPL CALCULATED.3IONS-SCNC: 12 MMOL/L (ref 3–16)
ANISOCYTOSIS: ABNORMAL
APTT: 29.1 SEC (ref 26–36)
BANDED NEUTROPHILS RELATIVE PERCENT: 1 % (ref 0–7)
BASOPHILS ABSOLUTE: 0 K/UL (ref 0–0.2)
BASOPHILS RELATIVE PERCENT: 0 %
BUN BLDV-MCNC: 10 MG/DL (ref 7–20)
CALCIUM SERPL-MCNC: 8.3 MG/DL (ref 8.3–10.6)
CHLORIDE BLD-SCNC: 92 MMOL/L (ref 99–110)
CO2: 24 MMOL/L (ref 21–32)
CREAT SERPL-MCNC: <0.5 MG/DL (ref 0.9–1.3)
CREATININE URINE: 79.6 MG/DL (ref 39–259)
CSF INTERPRETATION: NORMAL
EOSINOPHILS ABSOLUTE: 0 K/UL (ref 0–0.6)
EOSINOPHILS RELATIVE PERCENT: 0 %
GFR AFRICAN AMERICAN: >60
GFR NON-AFRICAN AMERICAN: >60
GLUCOSE BLD-MCNC: 86 MG/DL (ref 70–99)
HCT VFR BLD CALC: 23.7 % (ref 40.5–52.5)
HEMOGLOBIN: 8.3 G/DL (ref 13.5–17.5)
IGG CSF: 3.3 MG/DL (ref 0–6)
IGG INDEX: 0.51 (ref 0.2–0.7)
IGG SYNTHESIS RATE CSF: 1.5 MG/DAY (ref -9.9–3.3)
IGG: 258 MG/DL (ref 700–1600)
LYMPHOCYTES ABSOLUTE: 0.3 K/UL (ref 1–5.1)
LYMPHOCYTES RELATIVE PERCENT: 4 %
MAGNESIUM: 1.5 MG/DL (ref 1.8–2.4)
MCH RBC QN AUTO: 31.4 PG (ref 26–34)
MCHC RBC AUTO-ENTMCNC: 35 G/DL (ref 31–36)
MCV RBC AUTO: 89.8 FL (ref 80–100)
MONOCYTES ABSOLUTE: 0.7 K/UL (ref 0–1.3)
MONOCYTES RELATIVE PERCENT: 10 %
NEUTROPHILS ABSOLUTE: 5.8 K/UL (ref 1.7–7.7)
NEUTROPHILS RELATIVE PERCENT: 85 %
PDW BLD-RTO: 19.7 % (ref 12.4–15.4)
PLATELET # BLD: 72 K/UL (ref 135–450)
PMV BLD AUTO: 7.2 FL (ref 5–10.5)
POLYCHROMASIA: ABNORMAL
POTASSIUM SERPL-SCNC: 3 MMOL/L (ref 3.5–5.1)
RBC # BLD: 2.64 M/UL (ref 4.2–5.9)
SODIUM BLD-SCNC: 128 MMOL/L (ref 136–145)
WBC # BLD: 6.8 K/UL (ref 4–11)

## 2018-10-25 PROCEDURE — 2060000000 HC ICU INTERMEDIATE R&B

## 2018-10-25 PROCEDURE — 99233 SBSQ HOSP IP/OBS HIGH 50: CPT | Performed by: INTERNAL MEDICINE

## 2018-10-25 PROCEDURE — 2580000003 HC RX 258: Performed by: INTERNAL MEDICINE

## 2018-10-25 PROCEDURE — 36591 DRAW BLOOD OFF VENOUS DEVICE: CPT

## 2018-10-25 PROCEDURE — 6360000002 HC RX W HCPCS: Performed by: INTERNAL MEDICINE

## 2018-10-25 PROCEDURE — 6360000002 HC RX W HCPCS: Performed by: NURSE PRACTITIONER

## 2018-10-25 PROCEDURE — 85025 COMPLETE CBC W/AUTO DIFF WBC: CPT

## 2018-10-25 PROCEDURE — 83735 ASSAY OF MAGNESIUM: CPT

## 2018-10-25 PROCEDURE — 2500000003 HC RX 250 WO HCPCS: Performed by: INTERNAL MEDICINE

## 2018-10-25 PROCEDURE — 6370000000 HC RX 637 (ALT 250 FOR IP): Performed by: NURSE PRACTITIONER

## 2018-10-25 PROCEDURE — 2580000003 HC RX 258: Performed by: NURSE PRACTITIONER

## 2018-10-25 PROCEDURE — 85730 THROMBOPLASTIN TIME PARTIAL: CPT

## 2018-10-25 PROCEDURE — 80048 BASIC METABOLIC PNL TOTAL CA: CPT

## 2018-10-25 PROCEDURE — 6370000000 HC RX 637 (ALT 250 FOR IP): Performed by: INTERNAL MEDICINE

## 2018-10-25 RX ORDER — MAGNESIUM SULFATE 1 G/100ML
1 INJECTION INTRAVENOUS ONCE
Status: COMPLETED | OUTPATIENT
Start: 2018-10-25 | End: 2018-10-25

## 2018-10-25 RX ORDER — POTASSIUM CHLORIDE 29.8 MG/ML
20 INJECTION INTRAVENOUS
Status: ACTIVE | OUTPATIENT
Start: 2018-10-25 | End: 2018-10-25

## 2018-10-25 RX ADMIN — MAGNESIUM SULFATE IN DEXTROSE 1 G: 10 INJECTION, SOLUTION INTRAVENOUS at 00:23

## 2018-10-25 RX ADMIN — CEFTRIAXONE SODIUM 2 G: 2 INJECTION, POWDER, FOR SOLUTION INTRAMUSCULAR; INTRAVENOUS at 03:28

## 2018-10-25 RX ADMIN — VANCOMYCIN HYDROCHLORIDE 1500 MG: 10 INJECTION, POWDER, LYOPHILIZED, FOR SOLUTION INTRAVENOUS at 17:24

## 2018-10-25 RX ADMIN — THIAMINE HYDROCHLORIDE 500 MG: 100 INJECTION, SOLUTION INTRAMUSCULAR; INTRAVENOUS at 08:52

## 2018-10-25 RX ADMIN — VANCOMYCIN HYDROCHLORIDE 1500 MG: 10 INJECTION, POWDER, LYOPHILIZED, FOR SOLUTION INTRAVENOUS at 04:16

## 2018-10-25 RX ADMIN — BUPROPION HYDROCHLORIDE 150 MG: 150 TABLET, FILM COATED, EXTENDED RELEASE ORAL at 08:52

## 2018-10-25 RX ADMIN — SODIUM BICARBONATE: 84 INJECTION, SOLUTION INTRAVENOUS at 17:24

## 2018-10-25 RX ADMIN — FLUOXETINE 40 MG: 20 CAPSULE ORAL at 08:52

## 2018-10-25 RX ADMIN — POTASSIUM CHLORIDE 20 MEQ: 400 INJECTION, SOLUTION INTRAVENOUS at 07:20

## 2018-10-25 RX ADMIN — ACYCLOVIR SODIUM 800 MG: 50 INJECTION, SOLUTION INTRAVENOUS at 21:53

## 2018-10-25 RX ADMIN — POTASSIUM CHLORIDE 20 MEQ: 400 INJECTION, SOLUTION INTRAVENOUS at 08:49

## 2018-10-25 RX ADMIN — THIAMINE HYDROCHLORIDE 500 MG: 100 INJECTION, SOLUTION INTRAMUSCULAR; INTRAVENOUS at 15:18

## 2018-10-25 RX ADMIN — ONDANSETRON 8 MG: 2 INJECTION INTRAMUSCULAR; INTRAVENOUS at 00:41

## 2018-10-25 RX ADMIN — Medication 10 ML: at 08:52

## 2018-10-25 RX ADMIN — ACYCLOVIR SODIUM 800 MG: 50 INJECTION, SOLUTION INTRAVENOUS at 13:23

## 2018-10-25 RX ADMIN — ONDANSETRON 8 MG: 2 INJECTION INTRAMUSCULAR; INTRAVENOUS at 15:18

## 2018-10-25 RX ADMIN — POTASSIUM CHLORIDE 20 MEQ: 400 INJECTION, SOLUTION INTRAVENOUS at 09:55

## 2018-10-25 RX ADMIN — ACETAMINOPHEN 650 MG: 325 TABLET, FILM COATED ORAL at 15:49

## 2018-10-25 RX ADMIN — MAGNESIUM SULFATE HEPTAHYDRATE 1 G: 1 INJECTION, SOLUTION INTRAVENOUS at 09:57

## 2018-10-25 RX ADMIN — SODIUM BICARBONATE: 84 INJECTION, SOLUTION INTRAVENOUS at 00:23

## 2018-10-25 RX ADMIN — ENOXAPARIN SODIUM 40 MG: 40 INJECTION SUBCUTANEOUS at 20:41

## 2018-10-25 RX ADMIN — CEFTRIAXONE SODIUM 2 G: 2 INJECTION, POWDER, FOR SOLUTION INTRAMUSCULAR; INTRAVENOUS at 15:19

## 2018-10-25 RX ADMIN — PANTOPRAZOLE SODIUM 40 MG: 40 TABLET, DELAYED RELEASE ORAL at 08:57

## 2018-10-25 RX ADMIN — FAMOTIDINE 20 MG: 20 TABLET ORAL at 08:52

## 2018-10-25 RX ADMIN — POTASSIUM CHLORIDE 20 MEQ: 400 INJECTION, SOLUTION INTRAVENOUS at 05:51

## 2018-10-25 RX ADMIN — ACYCLOVIR SODIUM 800 MG: 50 INJECTION, SOLUTION INTRAVENOUS at 05:46

## 2018-10-25 RX ADMIN — THIAMINE HYDROCHLORIDE 500 MG: 100 INJECTION, SOLUTION INTRAMUSCULAR; INTRAVENOUS at 21:53

## 2018-10-25 ASSESSMENT — PAIN SCALES - GENERAL
PAINLEVEL_OUTOF10: 0

## 2018-10-25 ASSESSMENT — PAIN SCALES - WONG BAKER: WONGBAKER_NUMERICALRESPONSE: 0

## 2018-10-25 NOTE — PLAN OF CARE
Problem: Falls - Risk of:  Goal: Will remain free from falls  Will remain free from falls    Outcome: Ongoing  Pt remains free of falls. Bed in lowest position, wheels locked, side rails up 2/4, bed alarm in place. Possessions and call light within reach; pt uses call light appropriately. Will continue to monitor.     Stable/In Isolation:  Pt with activity orders for up ad romina. Encouraged pt to be up OOB as much as possible throughout the day and for all meals. Encouraged frequent short naps as necessary to preserve energy but instructed that while awake, pt should be OOB. Pt in isolation and is unable to ambulate in halls d/t restrictions. Continued to encourage activity in room as much as possible. Pt is visualized to be OOB 0% of the time this shift. Will continue to encourage frequent activity.        Problem: Risk for Impaired Skin Integrity  Goal: Tissue integrity - skin and mucous membranes  Structural intactness and normal physiological function of skin and  mucous membranes. Outcome: Ongoing  Pt with LP site to lower back. Otherwise no new skin issues to report.      Problem: Injury - Risk of, Physical Injury:  Goal: Will remain free from falls  Will remain free from falls    Outcome: Ongoing  Pt remains free of falls. Bed in lowest position, wheels locked, side rails up 2/4, bed alarm in place. Possessions and call light within reach; pt uses call light appropriately. Will continue to monitor.     Stable/In Isolation:  Pt with activity orders for up ad romina. Encouraged pt to be up OOB as much as possible throughout the day and for all meals. Encouraged frequent short naps as necessary to preserve energy but instructed that while awake, pt should be OOB. Pt in isolation and is unable to ambulate in halls d/t restrictions. Continued to encourage activity in room as much as possible. Pt is visualized to be OOB 0% of the time this shift.   Will continue to encourage frequent activity.        Problem:

## 2018-10-25 NOTE — PROGRESS NOTES
Speech Language Pathology - attempt    Attempted to see patient for BSE. The patient was somnolent and unable to demonstrate arousal. No eye opening or command following but spontaneous movements noted. Patient had no response to ice chip placed in oral cavity and demonstrated mouth closure preventing full presentation of ice cream. Patient had labial seal on straw but no ability to draw liquid up. RN aware. Will re-attempt at a later date.      Odalys Akhtar M.A., [facility]  Speech-Language Pathologist

## 2018-10-25 NOTE — PROGRESS NOTES
Encounters:   10/24/18 184 lb 8.4 oz (83.7 kg)   09/30/18 188 lb 4.8 oz (85.4 kg)   09/04/18 174 lb 1.6 oz (79 kg)         General: Somnolent   HEENT: normocephalic, disconjugate gaze.   no scleral erythema or icterus, Oral mucosa moist and intact, throat clear  NECK: supple without palpable adenopathy  BACK: Straight negative CVAT  SKIN: warm dry and intact without lesions rashes or masses  CHEST: CTA bilaterally without use of accessory muscles  CV: Normal S1 S2, RRR, no MRG  ABD: NT ND normoactive BS, no palpable masses or hepatosplenomegaly  EXTREMITIES: without edema, denies calf tenderness  NEURO: CN II - XII grossly intact  CATHETER: Right PAC (IR , 6/21/18) - No erythema or tenderness    Data    CBC:   Recent Labs      10/23/18   0325  10/24/18   0300  10/25/18   0330   WBC  9.5  7.0  6.8   HGB  8.3*  8.3*  8.3*   HCT  23.6*  24.1*  23.7*   MCV  90.2  91.2  89.8   PLT  82*  79*  72*     BMP/Mag:  Recent Labs      10/23/18   0325  10/24/18   0300  10/25/18   0330   NA  130*  128*  128*   K  3.4*  3.6  3.0*   CL  96*  95*  92*   CO2  21  22  24   PHOS   --   3.5   --    BUN  12  13  10   CREATININE  <0.5*  <0.5*  <0.5*   MG  1.40*  1.30*  1.50*     LIVP:   Recent Labs      10/24/18   0300   AST  16   ALT  13   BILIDIR  <0.2   BILITOT  0.4   ALKPHOS  19*     Coags:   Recent Labs      10/24/18   0300  10/25/18   0330   PROTIME  15.0*   --    INR  1.32*   --    APTT   --   29.1     Uric Acid   Recent Labs      10/24/18   0300   LABURIC  1.4*       CXray 10/21     Streaky bilateral opacities likely relates to atelectasis and/or pneumonitis       CT A/P 10/21  No acute intra-abdominal or pelvic pathology.       Multiple enlarged mesenteric lymph nodes and an enlarged spleen similar to prior examination.       Small to moderate hiatal hernia       High density material in the gallbladder likely sludge and/or small stones.       Areas of consolidation in the lower lungs and right middle lobe which are new when compared to prior exam       MRI brain w/wo contrast 10/23/18  1. Markedly abnormal MR imaging appearance of the thalamic and brainstem structures, including involvement of the rtena, left greater than right midbrain and left greater than right thalamic regions. There is some subtle diffusion restriction suggested in   the left thalamus, and there is also some subtle enhancement, ill-defined, involving the left thalamus, left midbrain and left cerebral peduncle region.  Appearance is nonspecific, although the 2 main differential diagnoses would include posterior   reversible encephalopathy syndrome (CT ES), which can be associated with chemotherapeutic agents, with additional differential diagnostic consideration that of osmotic demyelination. Correlate with any pertinent electrolyte imbalances                 PROBLEM LIST:         1.  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 Depression / Anxiety  3.  GERD  4.  HTN  5. Neutropenic Fever (7/2/18)     6. Ototoxicity           TREATMENT:            1.  CHOEP   Cycle #1 - 6/22/18  Cycle #2 - 7/16/18   2.  DHAP  Cycle #1 - 8/31/18  Cycle #2 - 9/28/18      ASSESSMENT AND PLAN:           1. Refractory PTCL, Stage IVb w/ BM involvement:   - CHOEP x 2 cycles w/ persistent lymphadenopathy on CT scan (refractory disease) (8/22/18)   - EGD Wilhembenjamin Elias, 8/29/18) - Negative, no diagnostic alterations   -CT abd/pelvis (9/21/18): Interval decrease in multiple abdominal lymph nodes, persistent splenomegaly.     DHAP  S/p 2 cycles  Dose reduce cisplatin by 50% due to ototoxicity    CSF analysis pending - if lymphoma, will add MTX/ac-C       2. ID: Bilateral lower lobe pneumonia, POA;  Concern for meningitis, Afebrile  - Continue Ceftriaxone 2 gm Q 12 hours D2 (10/24)  -Continue Vancomycin 1500 mg IV Q 12 hours D2 (10/24)  - BC NGTD (10/20)  -TB Mantoux test pending and quantiferon gold test pending (10/24)  -LP 10/24:  Encephalitis panel PCR negative, Gram stain negative, crypto appreciate recommendations    8. Weakness:    -Consult PT/OT    - DVT Prophylaxis: Platelets >03,952 cells/dL, - daily lovenox prophylaxis ordered  Contraindications to pharmacologic prophylaxis: None  Contraindications to mechanical prophylaxis: None    - Disposition: Once off IV abx      KYLIE Alexander - CNP    Dory Rangel.  Hui Bee DO, MS

## 2018-10-25 NOTE — PROGRESS NOTES
INR  1.32*     UA:No results for input(s): Mercado Yoon, GLUCOSEU, BILIRUBINUR, Red Mendez, BLOODU, PHUR, PROTEINU, UROBILINOGEN, NITRU, LEUKOCYTESUR, Sarabjit Dura in the last 72 hours. Urine Microscopic: No results for input(s): LABCAST, BACTERIA, COMU, HYALCAST, WBCUA, RBCUA, EPIU in the last 72 hours. Urine Culture: No results for input(s): LABURIN in the last 72 hours. Urine Chemistry:   Recent Labs      10/24/18   2030   CLUR  208   PROTEINUR  30.20*   NAUR  179             IMAGING:  FL LUMBAR PUNCTURE DIAG   Final Result   1. Technically successful fluoroscopically guided diagnostic lumbar puncture at the L3-L4 level as described. Opening pressure is measured as 15 cm of water. This is normal.      MRI BRAIN W WO CONTRAST   Final Result      1. Markedly abnormal MR imaging appearance of the thalamic and brainstem structures, including involvement of the trena, left greater than right midbrain and left greater than right thalamic regions. There is some subtle diffusion restriction suggested in    the left thalamus, and there is also some subtle enhancement, ill-defined, involving the left thalamus, left midbrain and left cerebral peduncle region. Appearance is nonspecific, although the 2 main differential diagnoses would include posterior    reversible encephalopathy syndrome (RI ES), which can be associated with chemotherapeutic agents, with additional differential diagnostic consideration that of osmotic demyelination. Correlate with any pertinent electrolyte imbalances. Results are telephoned to the nurse caring for the patient, Homero Wallace, at 4:08 pm on 10/23/2018. XR CHEST STANDARD (2 VW)   Final Result      Streaky bilateral opacities likely relates to atelectasis and/or pneumonitis. CT ABDOMEN PELVIS W IV CONTRAST Additional Contrast? None   Final Result      No acute intra-abdominal or pelvic pathology.       Multiple enlarged mesenteric lymph nodes and an

## 2018-10-25 NOTE — PLAN OF CARE
Pain level will decrease  Pain level will decrease   Outcome: Ongoing  Pt denies pain throughout shift. Educated on pain control measures. Monitoring closely. Problem: Bleeding:  Goal: Will show no signs and symptoms of excessive bleeding  Will show no signs and symptoms of excessive bleeding   Outcome: Ongoing  Patient's hemoglobin this AM:   Recent Labs      10/25/18   0330   HGB  8.3*     Patient's platelet count this AM:   Recent Labs      10/25/18   0330   PLT  72*    Thrombocytopenia Precautions in place. Patient showing no signs or symptoms of active bleeding. Transfusion not indicated at this time. Patient verbalizes understanding of all instructions. Will continue to assess and implement POC. Call light within reach and hourly rounding in place.

## 2018-10-25 NOTE — PROGRESS NOTES
coverage.    -placing PPD given turnaround time of quant gold.         Case discussed with the patients daughter  who is currently at bedside.      A copy of this note was provided for Dr Joel Trevino, MD     [address] [place] [address]  [phone]  Evenings, weekends, and off weeks please discuss with the covering neurologist.

## 2018-10-25 NOTE — FLOWSHEET NOTE
10/25/18 0916   Encounter Summary   Services provided to: Patient   Volunteer Visit (10/24 andressa Loza)   Sacraments   Communion Patient received communion

## 2018-10-26 ENCOUNTER — APPOINTMENT (OUTPATIENT)
Dept: GENERAL RADIOLOGY | Age: 58
DRG: 177 | End: 2018-10-26
Payer: COMMERCIAL

## 2018-10-26 LAB
ALBUMIN SERPL-MCNC: 2.6 G/DL (ref 3.4–5)
ALP BLD-CCNC: 17 U/L (ref 40–129)
ALT SERPL-CCNC: 10 U/L (ref 10–40)
ANION GAP SERPL CALCULATED.3IONS-SCNC: 11 MMOL/L (ref 3–16)
ANION GAP SERPL CALCULATED.3IONS-SCNC: 9 MMOL/L (ref 3–16)
ANISOCYTOSIS: ABNORMAL
AST SERPL-CCNC: 14 U/L (ref 15–37)
BASOPHILS ABSOLUTE: 0 K/UL (ref 0–0.2)
BASOPHILS RELATIVE PERCENT: 0.3 %
BILIRUB SERPL-MCNC: 0.3 MG/DL (ref 0–1)
BILIRUBIN DIRECT: <0.2 MG/DL (ref 0–0.3)
BILIRUBIN, INDIRECT: ABNORMAL MG/DL (ref 0–1)
BLOOD CULTURE, ROUTINE: NORMAL
BUN BLDV-MCNC: 8 MG/DL (ref 7–20)
BUN BLDV-MCNC: 9 MG/DL (ref 7–20)
CALCIUM SERPL-MCNC: 8.2 MG/DL (ref 8.3–10.6)
CALCIUM SERPL-MCNC: 8.3 MG/DL (ref 8.3–10.6)
CHLORIDE BLD-SCNC: 88 MMOL/L (ref 99–110)
CHLORIDE BLD-SCNC: 89 MMOL/L (ref 99–110)
CO2: 24 MMOL/L (ref 21–32)
CO2: 24 MMOL/L (ref 21–32)
CREAT SERPL-MCNC: <0.5 MG/DL (ref 0.9–1.3)
CREAT SERPL-MCNC: <0.5 MG/DL (ref 0.9–1.3)
CULTURE, BLOOD 2: NORMAL
EOSINOPHILS ABSOLUTE: 0 K/UL (ref 0–0.6)
EOSINOPHILS RELATIVE PERCENT: 0.2 %
GFR AFRICAN AMERICAN: >60
GFR AFRICAN AMERICAN: >60
GFR NON-AFRICAN AMERICAN: >60
GFR NON-AFRICAN AMERICAN: >60
GLUCOSE BLD-MCNC: 84 MG/DL (ref 70–99)
GLUCOSE BLD-MCNC: 91 MG/DL (ref 70–99)
HCT VFR BLD CALC: 22.3 % (ref 40.5–52.5)
HEMOGLOBIN: 7.7 G/DL (ref 13.5–17.5)
INR BLD: 1.68 (ref 0.86–1.14)
LACTATE DEHYDROGENASE: 302 U/L (ref 100–190)
LYMPHOCYTES ABSOLUTE: 0.3 K/UL (ref 1–5.1)
LYMPHOCYTES RELATIVE PERCENT: 4.5 %
MAGNESIUM: 1.1 MG/DL (ref 1.8–2.4)
MCH RBC QN AUTO: 31.5 PG (ref 26–34)
MCHC RBC AUTO-ENTMCNC: 34.7 G/DL (ref 31–36)
MCV RBC AUTO: 91 FL (ref 80–100)
MONOCYTES ABSOLUTE: 0.7 K/UL (ref 0–1.3)
MONOCYTES RELATIVE PERCENT: 10.8 %
NEUTROPHILS ABSOLUTE: 5.1 K/UL (ref 1.7–7.7)
NEUTROPHILS RELATIVE PERCENT: 84.2 %
OLIGOCLONAL BANDS CSF: 4
OLIGOCLONAL BANDS: 4
PDW BLD-RTO: 19.2 % (ref 12.4–15.4)
PH UA: 8
PHOSPHORUS: 3.1 MG/DL (ref 2.5–4.9)
PLATELET # BLD: 66 K/UL (ref 135–450)
PMV BLD AUTO: 7.3 FL (ref 5–10.5)
POTASSIUM SERPL-SCNC: 3.3 MMOL/L (ref 3.5–5.1)
POTASSIUM SERPL-SCNC: 4.2 MMOL/L (ref 3.5–5.1)
PROTHROMBIN TIME: 19.1 SEC (ref 9.8–13)
RBC # BLD: 2.45 M/UL (ref 4.2–5.9)
SLIDE REVIEW: ABNORMAL
SODIUM BLD-SCNC: 122 MMOL/L (ref 136–145)
SODIUM BLD-SCNC: 123 MMOL/L (ref 136–145)
SODIUM BLD-SCNC: 123 MMOL/L (ref 136–145)
SODIUM BLD-SCNC: 124 MMOL/L (ref 136–145)
TOTAL PROTEIN: 4.9 G/DL (ref 6.4–8.2)
URIC ACID, SERUM: 1.8 MG/DL (ref 3.5–7.2)
VANCOMYCIN TROUGH: 10.5 UG/ML (ref 10–20)
WBC # BLD: 6.1 K/UL (ref 4–11)

## 2018-10-26 PROCEDURE — 2580000003 HC RX 258: Performed by: NURSE PRACTITIONER

## 2018-10-26 PROCEDURE — 84295 ASSAY OF SERUM SODIUM: CPT

## 2018-10-26 PROCEDURE — 83735 ASSAY OF MAGNESIUM: CPT

## 2018-10-26 PROCEDURE — 51702 INSERT TEMP BLADDER CATH: CPT

## 2018-10-26 PROCEDURE — 85610 PROTHROMBIN TIME: CPT

## 2018-10-26 PROCEDURE — 2580000003 HC RX 258: Performed by: INTERNAL MEDICINE

## 2018-10-26 PROCEDURE — 84550 ASSAY OF BLOOD/URIC ACID: CPT

## 2018-10-26 PROCEDURE — 71045 X-RAY EXAM CHEST 1 VIEW: CPT

## 2018-10-26 PROCEDURE — 6360000002 HC RX W HCPCS: Performed by: NURSE PRACTITIONER

## 2018-10-26 PROCEDURE — 6360000002 HC RX W HCPCS: Performed by: INTERNAL MEDICINE

## 2018-10-26 PROCEDURE — 2500000003 HC RX 250 WO HCPCS: Performed by: INTERNAL MEDICINE

## 2018-10-26 PROCEDURE — 36591 DRAW BLOOD OFF VENOUS DEVICE: CPT

## 2018-10-26 PROCEDURE — 85025 COMPLETE CBC W/AUTO DIFF WBC: CPT

## 2018-10-26 PROCEDURE — 80048 BASIC METABOLIC PNL TOTAL CA: CPT

## 2018-10-26 PROCEDURE — 99232 SBSQ HOSP IP/OBS MODERATE 35: CPT | Performed by: INTERNAL MEDICINE

## 2018-10-26 PROCEDURE — 83986 ASSAY PH BODY FLUID NOS: CPT

## 2018-10-26 PROCEDURE — 80202 ASSAY OF VANCOMYCIN: CPT

## 2018-10-26 PROCEDURE — S0028 INJECTION, FAMOTIDINE, 20 MG: HCPCS | Performed by: NURSE PRACTITIONER

## 2018-10-26 PROCEDURE — 80076 HEPATIC FUNCTION PANEL: CPT

## 2018-10-26 PROCEDURE — 84100 ASSAY OF PHOSPHORUS: CPT

## 2018-10-26 PROCEDURE — 83615 LACTATE (LD) (LDH) ENZYME: CPT

## 2018-10-26 PROCEDURE — 2500000003 HC RX 250 WO HCPCS: Performed by: NURSE PRACTITIONER

## 2018-10-26 PROCEDURE — 2060000000 HC ICU INTERMEDIATE R&B

## 2018-10-26 RX ORDER — DEXAMETHASONE SODIUM PHOSPHATE 4 MG/ML
6 INJECTION, SOLUTION INTRA-ARTICULAR; INTRALESIONAL; INTRAMUSCULAR; INTRAVENOUS; SOFT TISSUE EVERY 6 HOURS
Status: DISCONTINUED | OUTPATIENT
Start: 2018-10-27 | End: 2018-10-29

## 2018-10-26 RX ORDER — FUROSEMIDE 10 MG/ML
40 INJECTION INTRAMUSCULAR; INTRAVENOUS EVERY 12 HOURS
Status: DISCONTINUED | OUTPATIENT
Start: 2018-10-27 | End: 2018-10-30

## 2018-10-26 RX ORDER — BUPROPION HYDROCHLORIDE 75 MG/1
75 TABLET ORAL 2 TIMES DAILY
Status: DISCONTINUED | OUTPATIENT
Start: 2018-10-26 | End: 2018-10-29

## 2018-10-26 RX ORDER — LEUCOVORIN CALCIUM 50 MG/5ML
150 INJECTION, POWDER, LYOPHILIZED, FOR SOLUTION INTRAMUSCULAR; INTRAVENOUS PRN
Status: DISCONTINUED | OUTPATIENT
Start: 2018-10-27 | End: 2018-11-01

## 2018-10-26 RX ORDER — POTASSIUM CHLORIDE 29.8 MG/ML
40 INJECTION INTRAVENOUS PRN
Status: DISCONTINUED | OUTPATIENT
Start: 2018-10-26 | End: 2018-11-07 | Stop reason: HOSPADM

## 2018-10-26 RX ORDER — FUROSEMIDE 10 MG/ML
20 INJECTION INTRAMUSCULAR; INTRAVENOUS PRN
Status: DISPENSED | OUTPATIENT
Start: 2018-10-26 | End: 2018-10-27

## 2018-10-26 RX ORDER — PROCHLORPERAZINE MALEATE 10 MG
10 TABLET ORAL EVERY 4 HOURS PRN
Status: DISCONTINUED | OUTPATIENT
Start: 2018-10-26 | End: 2018-11-08 | Stop reason: HOSPADM

## 2018-10-26 RX ORDER — MAGNESIUM SULFATE IN WATER 40 MG/ML
2 INJECTION, SOLUTION INTRAVENOUS PRN
Status: DISCONTINUED | OUTPATIENT
Start: 2018-10-26 | End: 2018-11-07 | Stop reason: HOSPADM

## 2018-10-26 RX ORDER — DEXAMETHASONE SODIUM PHOSPHATE 4 MG/ML
10 INJECTION, SOLUTION INTRA-ARTICULAR; INTRALESIONAL; INTRAMUSCULAR; INTRAVENOUS; SOFT TISSUE ONCE
Status: COMPLETED | OUTPATIENT
Start: 2018-10-26 | End: 2018-10-26

## 2018-10-26 RX ORDER — ONDANSETRON 2 MG/ML
24 INJECTION INTRAMUSCULAR; INTRAVENOUS EVERY 24 HOURS
Status: COMPLETED | OUTPATIENT
Start: 2018-10-26 | End: 2018-10-28

## 2018-10-26 RX ORDER — LEUCOVORIN CALCIUM 100 MG/10ML
20 INJECTION, POWDER, LYOPHILIZED, FOR SOLUTION INTRAMUSCULAR; INTRAVENOUS EVERY 6 HOURS
Status: DISCONTINUED | OUTPATIENT
Start: 2018-10-27 | End: 2018-10-27

## 2018-10-26 RX ORDER — DEXAMETHASONE SODIUM PHOSPHATE 1 MG/ML
2 SOLUTION/ DROPS OPHTHALMIC EVERY 6 HOURS SCHEDULED
Status: DISCONTINUED | OUTPATIENT
Start: 2018-10-27 | End: 2018-11-01

## 2018-10-26 RX ADMIN — VANCOMYCIN HYDROCHLORIDE 1500 MG: 10 INJECTION, POWDER, LYOPHILIZED, FOR SOLUTION INTRAVENOUS at 03:31

## 2018-10-26 RX ADMIN — ACYCLOVIR SODIUM 800 MG: 50 INJECTION, SOLUTION INTRAVENOUS at 05:36

## 2018-10-26 RX ADMIN — POTASSIUM CHLORIDE 20 MEQ: 400 INJECTION, SOLUTION INTRAVENOUS at 05:37

## 2018-10-26 RX ADMIN — ONDANSETRON 24 MG: 2 INJECTION INTRAMUSCULAR; INTRAVENOUS at 23:05

## 2018-10-26 RX ADMIN — DEXAMETHASONE SODIUM PHOSPHATE 10 MG: 4 INJECTION, SOLUTION INTRAMUSCULAR; INTRAVENOUS at 23:05

## 2018-10-26 RX ADMIN — METHOTREXATE 6000 MG: 25 INJECTION, SOLUTION INTRA-ARTERIAL; INTRAMUSCULAR; INTRATHECAL; INTRAVENOUS at 23:53

## 2018-10-26 RX ADMIN — POTASSIUM CHLORIDE 20 MEQ: 400 INJECTION, SOLUTION INTRAVENOUS at 09:54

## 2018-10-26 RX ADMIN — SODIUM BICARBONATE 150 ML/HR: 84 INJECTION, SOLUTION INTRAVENOUS at 20:45

## 2018-10-26 RX ADMIN — POTASSIUM CHLORIDE 20 MEQ: 400 INJECTION, SOLUTION INTRAVENOUS at 06:39

## 2018-10-26 RX ADMIN — SODIUM BICARBONATE 250 ML/HR: 84 INJECTION, SOLUTION INTRAVENOUS at 16:58

## 2018-10-26 RX ADMIN — CEFTRIAXONE SODIUM 2 G: 2 INJECTION, POWDER, FOR SOLUTION INTRAMUSCULAR; INTRAVENOUS at 02:49

## 2018-10-26 RX ADMIN — Medication 10 ML: at 20:40

## 2018-10-26 RX ADMIN — METHOTREXATE 1000 MG: 25 INJECTION, SOLUTION INTRA-ARTERIAL; INTRAMUSCULAR; INTRATHECAL; INTRAVENOUS at 23:33

## 2018-10-26 RX ADMIN — SODIUM BICARBONATE: 84 INJECTION, SOLUTION INTRAVENOUS at 04:39

## 2018-10-26 RX ADMIN — Medication 20 MG: at 18:21

## 2018-10-26 RX ADMIN — THIAMINE HYDROCHLORIDE 500 MG: 100 INJECTION, SOLUTION INTRAMUSCULAR; INTRAVENOUS at 11:05

## 2018-10-26 RX ADMIN — Medication 10 ML: at 10:12

## 2018-10-26 RX ADMIN — POTASSIUM CHLORIDE 20 MEQ: 400 INJECTION, SOLUTION INTRAVENOUS at 04:39

## 2018-10-26 RX ADMIN — ENOXAPARIN SODIUM 40 MG: 40 INJECTION SUBCUTANEOUS at 20:39

## 2018-10-26 RX ADMIN — MAGNESIUM SULFATE HEPTAHYDRATE 4 G: 40 INJECTION, SOLUTION INTRAVENOUS at 04:39

## 2018-10-26 RX ADMIN — SODIUM CHLORIDE: 234 INJECTION INTRAMUSCULAR; INTRAVENOUS; SUBCUTANEOUS at 10:10

## 2018-10-26 ASSESSMENT — PAIN SCALES - GENERAL
PAINLEVEL_OUTOF10: 0

## 2018-10-26 NOTE — PLAN OF CARE
Will continue to assess and implement POC. Call light within reach and hourly rounding in place. Problem: Infection - Central Venous Catheter-Associated Bloodstream Infection:  Goal: Will show no infection signs and symptoms  Will show no infection signs and symptoms   Outcome: Ongoing  CVC site remains free of signs/symptoms of infection. No drainage, edema, erythema, pain, or warmth noted at site. Dressing changes continue per protocol and on an as needed basis - see flowsheet. Compliant with [facility] Bath Protocol:  Performed CHG bath today per [facility] protocol utilizing Bed bath with CHG wipes. CVC site cleansed with CHG wipe over dressing, skin surrounding dressing, and first 6\" of IV tubing. Pt tolerated well. Continued to encourage daily CHG bathing per [facility] protocol.     Problem: Venous Thromboembolism:  Goal: Will show no signs or symptoms of venous thromboembolism  Will show no signs or symptoms of venous thromboembolism   Outcome: Ongoing

## 2018-10-26 NOTE — CONSULTS
precautions, and RD unable to see. Speech therapy recommending NPO as pt has been lethargic. Per MD note, pt w/ brain stem encephalitis and PTCL now w/ CNS involvement. Will provide recommendations. · Nutrition-Focused Physical Findings: no edema; emesis 10/24; x4 BM ~24 hrs   · Wound Type: None  · Current Nutrition Therapies:  · Oral Diet Orders: General (speech recommending NPO)   · Oral Diet intake: 26-50%, 51-75% (prior to NPO rec)  · Oral Nutrition Supplement (ONS) Orders: Standard High Calorie Oral Supplement  · Tube Feeding (TF) Orders:   · Feeding Route: Nasogastric  · Formula: None  · Goal TF & Flush Orders Provides: RD Goal: Jevity 1.2 @ 75ml/hr x 24 hrs to provide 1800 TV; 2160 kcal; 100g protein; 1452 ml free water   · Additional Calories:    · Anthropometric Measures:  · Ht: 6' 1\" (185.4 cm)   · Current Body Wt: 181 lb 14.1 oz (82.5 kg)  · Admission Body Wt: 175 lb 11.3 oz (79.7 kg)  · Usual Body Wt: 230 lb (104.3 kg)  · % Weight Change: 18%,  x 4 months   · Ideal Body Wt: 184 lb (83.5 kg),   · BMI Classification: BMI 18.5 - 24.9 Normal Weight  · Comparative Standards (Estimated Nutrition Needs):  · Estimated Daily Total Kcal: 7207-0977  · Estimated Daily Protein (g):   · Estimated Daily Fluid (ml/day): 0601-6079    Estimated Intake vs Estimated Needs: Intake Less Than Needs    Nutrition Risk Level: High    Nutrition Interventions:   Start Tube Feeding  Continued Inpatient Monitoring    Nutrition Evaluation:   · Evaluation: Goals set   · Goals: Pt will tolerate EN until po diet can be advanced w/ consistent po intake of 75% or more of meals    · Monitoring: Diet Progression, TF Intake, TF Tolerance, Weight, Pertinent Labs    See Adult Nutrition Doc Flowsheet for more detail.      Electronically signed by Darío Mitchell RD, LD on 10/26/18 at 1:20 PM    SULMA ANTUNEZ, LD  Pager:  [phone]  Office:  [phone]

## 2018-10-26 NOTE — PROGRESS NOTES
Neurology Follow Up  Note    Updates:  Patient is somnolent, non-verbal this morning. No family at bedside. ROS:  Patient not responsive to questions. Exam:  Blood pressure 108/66, pulse 89, temperature 97.6 °F (36.4 °C), temperature source Axillary, resp. rate 16, height 6' 1\" (1.854 m), weight 181 lb 14.1 oz (82.5 kg), SpO2 95 %. Constitutional    Vital signs: BP, HR, and RR reviewed   General Somnolent , no distress, well-nourished  Eyes: fundoscopic exam not visualized. Cardiovascular: pulses symmetric in all 4 extremities. No peripheral edema. Psychiatric: unable to cooperate with examination fully , no  psychotic behavior noted. Neurologic  Mental status:   orientation does not respond to questions    General fund of knowledge does not respond to questions    Memory does not respond to questions    Attention does not respond to questions      Language does not respond to questions    Comprehension not following commands  Cranial nerves:   CN 3,4,6: EOM limitation of left abduction. CN5: facial sensation symmetric   CN7: unable to assess  CN8: unable to assess  CN9: unable to assess  CN11: unable to assess   WJ11: unable to illicit patient to protrude tongue. Strength: movies all extremities spontaneously   Deep tendon reflexes: hyporeflexic. Sensory: light touch intact in all 4 extremities. No sensory extinction on double simultaneous stimulation  Cerebellar/coordination: patient not following commands   Tone: normal in all 4 extremities. No myoclonus noted. Gait: deferred due to safety concerns. Labs:  Hepatic:   Recent Labs      10/24/18   0300  10/26/18   0332   AST  16  14*   ALT  13  10   BILITOT  0.4  0.3   ALKPHOS  19*  17*       INR:   Recent Labs      10/24/18   0300  10/26/18   0332   INR  1.32*  1.68*      Ref.  Range 10/25/2018 03:30   Sodium Latest Ref Range: 136 - 145 mmol/L 128 (L)   Potassium Latest Ref Range: 3.5 - 5.1 mmol/L 3.0 (L)   Chloride Latest Ref Range: structures, including involvement of the trena, left greater than right midbrain and left greater than right thalamic regions. There is some subtle diffusion restriction suggested in   the left thalamus, and there is also some subtle enhancement, ill-defined, involving the left thalamus, left midbrain and left cerebral peduncle region.       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 Left CN palsy  6th suggesting non-localizing 6th but mid/upper brainstem is worrisome as are ICP issues from meningitis (malignant as well as infectious). MRI with T2 hyperintensity throughout the brainstem into the left BG without much anita+. ddx is including nutritional/metabolic, infectious, and inflammatory with malignancy less likely but excluded. Infectious is most worrisome. 1. Brainstem Encephalitis  2. Diplopia  3. Pneumonia  4. T cell lymphoma    Recommendations:  - CSF cytology positive, noted plans to start MTX and cytarabine with CNS dosing of decadron today   - continue thiamine replacement  - replete electrolyte imbalances as per primary team   - flow cytometry, autoimmune panel, CSF viral, fungal, atypical bacteria (TB, listeria, whipples,ect), paraneoplastic panel, MARIELA virus pending   - check Quantiferon gold, collected yesterday    - TB Mantoux test pending   - off all antibiotics and acyclovir 10/26        Phoebe Rodriguez, PGY-2  Internal Medicine Resident    Patient will be staffed with the attending physician.

## 2018-10-26 NOTE — PROGRESS NOTES
(CATHFLO) injection 2 mg PRN           Physical exam:     Vitals:  /72   Pulse 83   Temp 99.7 °F (37.6 °C) (Axillary)   Resp 26   Ht 6' 1\" (1.854 m)   Wt 181 lb 14.1 oz (82.5 kg)   SpO2 94%   BMI 24.00 kg/m²   Constitutional:  AMS   Skin: no rash, turgor wnl  Heent:  eomi, mmm  Neck: no bruits or jvd noted  Cardiovascular:  S1, S2 without m/r/g  Respiratory: CTA B without w/r/r  Abdomen:  +bs, soft, nt, nd  Ext: no lower extremity edema  Psychiatric: mood and affect flat  Musculoskeletal:  Rom, muscular strength dec     Data:   Labs:  CBC:   Recent Labs      10/24/18   0300  10/25/18   0330  10/26/18   0332   WBC  7.0  6.8  6.1   HGB  8.3*  8.3*  7.7*   PLT  79*  72*  66*     BMP:    Recent Labs      10/24/18   0300  10/25/18   0330  10/26/18   0332   NA  128*  128*  123*   K  3.6  3.0*  3.3*   CL  95*  92*  88*   CO2  22  24  24   BUN  13  10  9   CREATININE  <0.5*  <0.5*  <0.5*   GLUCOSE  87  86  91     Ca/Mg/Phos:   Recent Labs      10/24/18   0300  10/25/18   0330  10/26/18   0332   CALCIUM  8.4  8.3  8.2*   MG  1.30*  1.50*  1.10*   PHOS  3.5   --   3.1     Hepatic:   Recent Labs      10/24/18   0300  10/26/18   0332   AST  16  14*   ALT  13  10   BILITOT  0.4  0.3   ALKPHOS  19*  17*     Troponin: No results for input(s): TROPONINI in the last 72 hours. BNP: No results for input(s): BNP in the last 72 hours. Lipids: No results for input(s): CHOL, TRIG, HDL, LDLCALC, LABVLDL in the last 72 hours. ABGs: No results for input(s): PHART, PO2ART, NTH9FXO in the last 72 hours. INR:   Recent Labs      10/24/18   0300  10/26/18   0332   INR  1.32*  1.68*     UA:No results for input(s): Hiren Ishihara, GLUCOSEU, BILIRUBINUR, KETUA, SPECGRAV, BLOODU, PHUR, PROTEINU, UROBILINOGEN, NITRU, LEUKOCYTESUR, Candice Corner in the last 72 hours. Urine Microscopic: No results for input(s): LABCAST, BACTERIA, COMU, HYALCAST, WBCUA, RBCUA, EPIU in the last 72 hours.   Urine Culture: No results for input(s):

## 2018-10-27 LAB
ANION GAP SERPL CALCULATED.3IONS-SCNC: 10 MMOL/L (ref 3–16)
ANION GAP SERPL CALCULATED.3IONS-SCNC: 11 MMOL/L (ref 3–16)
BANDED NEUTROPHILS RELATIVE PERCENT: 2 % (ref 0–7)
BASOPHILS ABSOLUTE: 0 K/UL (ref 0–0.2)
BASOPHILS RELATIVE PERCENT: 0 %
BUN BLDV-MCNC: 8 MG/DL (ref 7–20)
BUN BLDV-MCNC: 8 MG/DL (ref 7–20)
CALCIUM SERPL-MCNC: 8.4 MG/DL (ref 8.3–10.6)
CALCIUM SERPL-MCNC: 8.4 MG/DL (ref 8.3–10.6)
CHLORIDE BLD-SCNC: 91 MMOL/L (ref 99–110)
CHLORIDE BLD-SCNC: 92 MMOL/L (ref 99–110)
CO2: 24 MMOL/L (ref 21–32)
CO2: 24 MMOL/L (ref 21–32)
CREAT SERPL-MCNC: <0.5 MG/DL (ref 0.9–1.3)
CREAT SERPL-MCNC: <0.5 MG/DL (ref 0.9–1.3)
CYTOMEGALOVIRUS PCR DETECTION: NOT DETECTED
CYTOMEGALOVIRUS SOURCE: NORMAL
EOSINOPHILS ABSOLUTE: 0 K/UL (ref 0–0.6)
EOSINOPHILS RELATIVE PERCENT: 0 %
GFR AFRICAN AMERICAN: >60
GFR AFRICAN AMERICAN: >60
GFR NON-AFRICAN AMERICAN: >60
GFR NON-AFRICAN AMERICAN: >60
GLUCOSE BLD-MCNC: 101 MG/DL (ref 70–99)
GLUCOSE BLD-MCNC: 102 MG/DL (ref 70–99)
HCT VFR BLD CALC: 22.3 % (ref 40.5–52.5)
HEMOGLOBIN: 7.8 G/DL (ref 13.5–17.5)
HERPES SIMPLEX VIRUS BY PCR: NOT DETECTED
HERPES SIMPLEX VIRUS BY PCR: NOT DETECTED
HSV SOURCE: NORMAL
HSV SOURCE: NORMAL
LYMPHOCYTES ABSOLUTE: 0.2 K/UL (ref 1–5.1)
LYMPHOCYTES RELATIVE PERCENT: 3 %
MAGNESIUM: 1.5 MG/DL (ref 1.8–2.4)
MCH RBC QN AUTO: 31.6 PG (ref 26–34)
MCHC RBC AUTO-ENTMCNC: 34.9 G/DL (ref 31–36)
MCV RBC AUTO: 90.6 FL (ref 80–100)
METAMYELOCYTES RELATIVE PERCENT: 2 %
MONOCYTES ABSOLUTE: 0.3 K/UL (ref 0–1.3)
MONOCYTES RELATIVE PERCENT: 5 %
NEUTROPHILS ABSOLUTE: 4.7 K/UL (ref 1.7–7.7)
NEUTROPHILS RELATIVE PERCENT: 88 %
PDW BLD-RTO: 19.4 % (ref 12.4–15.4)
PH UA: 7
PH UA: 7.5
PH UA: 8.5
PLATELET # BLD: 69 K/UL (ref 135–450)
PMV BLD AUTO: 7.6 FL (ref 5–10.5)
POTASSIUM SERPL-SCNC: 4.4 MMOL/L (ref 3.5–5.1)
POTASSIUM SERPL-SCNC: 4.5 MMOL/L (ref 3.5–5.1)
RBC # BLD: 2.46 M/UL (ref 4.2–5.9)
SODIUM BLD-SCNC: 125 MMOL/L (ref 136–145)
SODIUM BLD-SCNC: 127 MMOL/L (ref 136–145)
SODIUM BLD-SCNC: 131 MMOL/L (ref 136–145)
SODIUM BLD-SCNC: 132 MMOL/L (ref 136–145)
SODIUM BLD-SCNC: 132 MMOL/L (ref 136–145)
VDRL CSF SCREEN: NON REACTIVE
WBC # BLD: 5.1 K/UL (ref 4–11)

## 2018-10-27 PROCEDURE — 85025 COMPLETE CBC W/AUTO DIFF WBC: CPT

## 2018-10-27 PROCEDURE — 2580000003 HC RX 258: Performed by: NURSE PRACTITIONER

## 2018-10-27 PROCEDURE — 2500000003 HC RX 250 WO HCPCS: Performed by: INTERNAL MEDICINE

## 2018-10-27 PROCEDURE — 2500000003 HC RX 250 WO HCPCS: Performed by: NURSE PRACTITIONER

## 2018-10-27 PROCEDURE — 6360000002 HC RX W HCPCS: Performed by: INTERNAL MEDICINE

## 2018-10-27 PROCEDURE — 83735 ASSAY OF MAGNESIUM: CPT

## 2018-10-27 PROCEDURE — 6360000002 HC RX W HCPCS: Performed by: NURSE PRACTITIONER

## 2018-10-27 PROCEDURE — 96413 CHEMO IV INFUSION 1 HR: CPT

## 2018-10-27 PROCEDURE — 2060000000 HC ICU INTERMEDIATE R&B

## 2018-10-27 PROCEDURE — 2580000003 HC RX 258: Performed by: INTERNAL MEDICINE

## 2018-10-27 PROCEDURE — S0028 INJECTION, FAMOTIDINE, 20 MG: HCPCS | Performed by: NURSE PRACTITIONER

## 2018-10-27 PROCEDURE — 6370000000 HC RX 637 (ALT 250 FOR IP): Performed by: INTERNAL MEDICINE

## 2018-10-27 PROCEDURE — 36591 DRAW BLOOD OFF VENOUS DEVICE: CPT

## 2018-10-27 PROCEDURE — 83986 ASSAY PH BODY FLUID NOS: CPT

## 2018-10-27 PROCEDURE — 84295 ASSAY OF SERUM SODIUM: CPT

## 2018-10-27 PROCEDURE — 80048 BASIC METABOLIC PNL TOTAL CA: CPT

## 2018-10-27 RX ORDER — LEUCOVORIN CALCIUM 100 MG/10ML
20 INJECTION, POWDER, LYOPHILIZED, FOR SOLUTION INTRAMUSCULAR; INTRAVENOUS EVERY 6 HOURS
Status: DISCONTINUED | OUTPATIENT
Start: 2018-10-27 | End: 2018-11-01

## 2018-10-27 RX ADMIN — DEXAMETHASONE SODIUM PHOSPHATE 6 MG: 4 INJECTION, SOLUTION INTRAMUSCULAR; INTRAVENOUS at 05:07

## 2018-10-27 RX ADMIN — SODIUM CHLORIDE: 234 INJECTION INTRAMUSCULAR; INTRAVENOUS; SUBCUTANEOUS at 15:31

## 2018-10-27 RX ADMIN — ENOXAPARIN SODIUM 40 MG: 40 INJECTION SUBCUTANEOUS at 21:09

## 2018-10-27 RX ADMIN — SODIUM CHLORIDE: 234 INJECTION INTRAMUSCULAR; INTRAVENOUS; SUBCUTANEOUS at 03:06

## 2018-10-27 RX ADMIN — DEXAMETHASONE SODIUM PHOSPHATE 6 MG: 4 INJECTION, SOLUTION INTRAMUSCULAR; INTRAVENOUS at 12:21

## 2018-10-27 RX ADMIN — SODIUM CHLORIDE 15 ML: 900 IRRIGANT IRRIGATION at 21:12

## 2018-10-27 RX ADMIN — DEXAMETHASONE SODIUM PHOSPHATE 2 DROP: 1 SOLUTION/ DROPS OPHTHALMIC at 18:02

## 2018-10-27 RX ADMIN — ONDANSETRON 24 MG: 2 INJECTION INTRAMUSCULAR; INTRAVENOUS at 23:00

## 2018-10-27 RX ADMIN — Medication 10 ML: at 21:09

## 2018-10-27 RX ADMIN — MAGNESIUM SULFATE HEPTAHYDRATE 2 G: 40 INJECTION, SOLUTION INTRAVENOUS at 05:07

## 2018-10-27 RX ADMIN — DEXAMETHASONE SODIUM PHOSPHATE 2 DROP: 1 SOLUTION/ DROPS OPHTHALMIC at 23:41

## 2018-10-27 RX ADMIN — SODIUM BICARBONATE 150 ML/HR: 84 INJECTION, SOLUTION INTRAVENOUS at 06:51

## 2018-10-27 RX ADMIN — LEUCOVORIN CALCIUM 20 MG: 100 INJECTION, POWDER, LYOPHILIZED, FOR SOLUTION INTRAMUSCULAR; INTRAVENOUS at 23:30

## 2018-10-27 RX ADMIN — DEXAMETHASONE SODIUM PHOSPHATE 6 MG: 4 INJECTION, SOLUTION INTRAMUSCULAR; INTRAVENOUS at 23:00

## 2018-10-27 RX ADMIN — DEXAMETHASONE SODIUM PHOSPHATE 6 MG: 4 INJECTION, SOLUTION INTRAMUSCULAR; INTRAVENOUS at 18:00

## 2018-10-27 RX ADMIN — SODIUM BICARBONATE 150 ML/HR: 84 INJECTION, SOLUTION INTRAVENOUS at 15:31

## 2018-10-27 RX ADMIN — SODIUM BICARBONATE 150 ML/HR: 84 INJECTION, SOLUTION INTRAVENOUS at 21:09

## 2018-10-27 RX ADMIN — DEXAMETHASONE SODIUM PHOSPHATE 2 DROP: 1 SOLUTION/ DROPS OPHTHALMIC at 12:23

## 2018-10-27 RX ADMIN — THIAMINE HYDROCHLORIDE 100 MG: 100 INJECTION, SOLUTION INTRAMUSCULAR; INTRAVENOUS at 11:00

## 2018-10-27 RX ADMIN — SODIUM CHLORIDE 250 ML: 9 INJECTION, SOLUTION INTRAVENOUS at 22:58

## 2018-10-27 RX ADMIN — Medication 20 MG: at 10:04

## 2018-10-27 RX ADMIN — CYTARABINE 4000 MG: 100 INJECTION, SOLUTION INTRATHECAL; INTRAVENOUS; SUBCUTANEOUS at 23:34

## 2018-10-27 RX ADMIN — Medication 10 ML: at 10:04

## 2018-10-27 RX ADMIN — FUROSEMIDE 20 MG: 10 INJECTION, SOLUTION INTRAMUSCULAR; INTRAVENOUS at 16:27

## 2018-10-27 RX ADMIN — Medication 20 MG: at 21:09

## 2018-10-27 ASSESSMENT — PAIN SCALES - PAIN ASSESSMENT IN ADVANCED DEMENTIA (PAINAD)
FACIALEXPRESSION: 0
TOTALSCORE: 0
BREATHING: 0
TOTALSCORE: 0
FACIALEXPRESSION: 0
CONSOLABILITY: 0
CONSOLABILITY: 0
BODYLANGUAGE: 0
CONSOLABILITY: 0
TOTALSCORE: 0
BODYLANGUAGE: 0
CONSOLABILITY: 0
NEGVOCALIZATION: 0
BODYLANGUAGE: 0
BREATHING: 0
FACIALEXPRESSION: 0
FACIALEXPRESSION: 0
BREATHING: 0
BREATHING: 0
NEGVOCALIZATION: 0
BREATHING: 0
FACIALEXPRESSION: 0
TOTALSCORE: 0
NEGVOCALIZATION: 0
CONSOLABILITY: 0
NEGVOCALIZATION: 0
FACIALEXPRESSION: 0
BREATHING: 0
TOTALSCORE: 0
NEGVOCALIZATION: 0
TOTALSCORE: 0
NEGVOCALIZATION: 0
CONSOLABILITY: 0

## 2018-10-27 ASSESSMENT — PAIN SCALES - GENERAL
PAINLEVEL_OUTOF10: 0
PAINLEVEL_OUTOF10: 0

## 2018-10-27 NOTE — PLAN OF CARE
for assistance. Will continue with hourly rounds for PO intake, pain needs, toileting and repositioning as needed. Problem: Bleeding:  Goal: Will show no signs and symptoms of excessive bleeding  Will show no signs and symptoms of excessive bleeding   Outcome: Ongoing  Patient's hemoglobin this AM:   Recent Labs      10/27/18   0310   HGB  7.8*     Patient's platelet count this AM:   Recent Labs      10/27/18   0310   PLT  69*     Patient showing no signs or symptoms of active bleeding. Transfusion is not indicated at this time. Patient verbalizes understanding of all instructions. Will continue to assess and implement POC. Call light within reach and hourly rounding in place. Problem: Pain:  Goal: Pain level will decrease  Pain level will decrease   Outcome: Ongoing  Pt denied any pain this shift. Will continue to assess for pain and monitor. Problem: Infection - Central Venous Catheter-Associated Bloodstream Infection:  Goal: Will show no infection signs and symptoms  Will show no infection signs and symptoms   Outcome: Ongoing  CVC site remains free of signs/symptoms of infection. No drainage, edema, erythema, pain, or warmth noted at site. Dressing changes continue per protocol and on an as needed basis - see flowsheet. Problem: Nausea/Vomiting:  Goal: Absence of nausea/vomiting  Absence of nausea/vomiting   Outcome: Ongoing  Pt denied any nausea this shift. Will continue to monitor. Problem: Venous Thromboembolism:  Goal: Will show no signs or symptoms of venous thromboembolism  Will show no signs or symptoms of venous thromboembolism   Outcome: Ongoing  Adherent with DVT Prevention: Pt is at risk for DVT d/t decreased mobility and cancer treatment. Pt educated on importance of activity. Pt has orders for prophylactic Lovenox. Pt verbalizes understanding of need for prophylaxis while inpatient.      Problem: PROTECTIVE PRECAUTIONS  Goal: Patient will remain free of nosocomial

## 2018-10-27 NOTE — PROGRESS NOTES
[facility] Progress Note    10/27/2018     Kera Collins    MRN: [id number]    : 1960    Referring MD: Stephie Street  [facility], [facility] [place]    SUBJECTIVE:  As below     ECOG PS:(4) Completely disabled, unable to carry out self-care and confined to bed or chair    Isolation: Airborne    Medications    Scheduled Meds:   leucovorin calcium  20 mg Intravenous Q6H    famotidine (PEPCID) injection  20 mg Intravenous BID    buPROPion  75 mg PEG Tube BID    furosemide  40 mg Intravenous Q12H    ondansetron  24 mg Intravenous Q24H    dexamethasone  6 mg Intravenous Q6H    dexamethasone  2 drop Both Eyes 4 times per day    cytarabine (CYTOSAR) chemo IVPB  4,000 mg Intravenous 2 times per day    enoxaparin  40 mg Subcutaneous Nightly    thiamine (VITAMIN B1) IVPB  100 mg Intravenous Q24H    FLUoxetine  40 mg Oral Daily    sodium chloride flush  10 mL Intravenous 2 times per day    Saline Mouthwash  15 mL Swish & Spit 4x Daily AC & HS     Continuous Infusions:   IV infusion builder Stopped (10/27/18 1300)    IV infusion builder 150 mL/hr (10/27/18 0651)    sodium chloride       PRN Meds:.potassium chloride, magnesium sulfate, furosemide, prochlorperazine **OR** prochlorperazine, sodium bicarbonate, leucovorin calcium, acetaminophen, ondansetron **OR** ondansetron, sodium chloride, sodium chloride flush, potassium chloride, magnesium sulfate, magnesium hydroxide, Saline Mouthwash, alteplase    ROS:  Unable to assess d/t AMS changes    Physical Exam:     I&O:      Intake/Output Summary (Last 24 hours) at 10/27/18 1349  Last data filed at 10/27/18 1305   Gross per 24 hour   Intake             4312 ml   Output             3325 ml   Net              987 ml       Vital Signs:  /81   Pulse 80   Temp 97.8 °F (36.6 °C) (Tympanic)   Resp 20   Ht 6' 1\" (1.854 m)   Wt 178 lb 5.6 oz (80.9 kg)   SpO2 96%   BMI 23.53 kg/m²     Weight:    Wt Readings from Last 3

## 2018-10-27 NOTE — PROGRESS NOTES
Speech Language Pathology  Not Seen Note    Reviewed chart and discussed with CAROLINE Elmore) and MD Aaron Anthony MD). Patient presents with encephalitis and at this time is too lethargic/confused to participate in Bedside Swallowing Evaluation. Will re-attempt for swallowing evaluation on 10/28 as appropriate.     Electronically Signed by:  David Addison, [facility] Certified Clinician  FEES Certified Clinician  Speech-Language Pathologist  [phone]  Pager #[phone]

## 2018-10-27 NOTE — PROGRESS NOTES
Nephrology Progress Note    Sodium level improved to 127   Bicarb 24   Cont to have  Altered mental status      Past Medical History:   Diagnosis Date    Anxiety     Cancer ([address] 06/2018    Peripheral T - Cell Lymphoma, Stage IVb    GERD (gastroesophageal reflux disease)     Hiatal hernia     History of blood transfusion     HTN (hypertension)        Past Surgical History:   Procedure Laterality Date    APPENDECTOMY      INGUINAL HERNIA REPAIR Right     UPPER GASTROINTESTINAL ENDOSCOPY N/A 8/29/2018    EGD BIOPSY performed by Gerhardt Burlington, MD at [facility] ENDOSCOPY       Family History   Problem Relation Age of Onset    Dementia Father          Current Medications:      leucovorin calcium (WELLCOVORIN) injection 20 mg Q6H   2 % Sodium Chloride Infusion 1000 ml Continuous   potassium chloride 20 mEq/50 mL IVPB (Central Line) PRN   magnesium sulfate 2 g in 50 mL IVPB premix PRN   famotidine (PEPCID) injection 20 mg BID   buPROPion (WELLBUTRIN) tablet 75 mg BID   sodium bicarbonate 75 mEq in sodium chloride 0.45 % 1,000 mL infusion Continuous   furosemide (LASIX) injection 20 mg PRN   prochlorperazine (COMPAZINE) tablet 10 mg Q4H PRN   Or    prochlorperazine (COMPAZINE) injection 10 mg Q4H PRN   sodium bicarbonate 8.4 % injection 50 mEq PRN   leucovorin calcium (WELLCOVORIN) injection 150 mg PRN   furosemide (LASIX) injection 40 mg Q12H   ondansetron (ZOFRAN) injection 24 mg Q24H   dexamethasone (DECADRON) injection 6 mg Q6H   dexamethasone (DECADRON) 0.1 % ophthalmic solution 2 drop 4 times per day   cytarabine (PF) (CYTARABINE) 4,000 mg in sodium chloride 0.9 % 250 mL chemo infusion 2 times per day   enoxaparin (LOVENOX) injection 40 mg Nightly   thiamine (B-1) 100 mg in sodium chloride 0.9 % 100 mL IVPB Q24H   acetaminophen (TYLENOL) tablet 650 mg Q4H PRN   ondansetron (ZOFRAN) injection 8 mg Q8H PRN   Or    ondansetron 10   BILITOT  0.3   ALKPHOS  17*     Troponin: No results for input(s): TROPONINI in the last 72 hours. BNP: No results for input(s): BNP in the last 72 hours. Lipids: No results for input(s): CHOL, TRIG, HDL, LDLCALC, LABVLDL in the last 72 hours. ABGs: No results for input(s): PHART, PO2ART, ASD9GLK in the last 72 hours. INR:   Recent Labs      10/26/18   0332   INR  1.68*     UA:  Recent Labs      10/27/18   1158   PHUR  7.5      Urine Microscopic: No results for input(s): LABCAST, BACTERIA, COMU, HYALCAST, WBCUA, RBCUA, EPIU in the last 72 hours. Urine Culture: No results for input(s): LABURIN in the last 72 hours. Urine Chemistry:   Recent Labs      10/24/18   2030   CLUR  208   LABCREA  79.6   PROTEINUR  30.20*   NAUR  179             IMAGING:  XR CHEST PORTABLE   Final Result      1. No acute disease. FL LUMBAR PUNCTURE DIAG   Final Result   1. Technically successful fluoroscopically guided diagnostic lumbar puncture at the L3-L4 level as described. Opening pressure is measured as 15 cm of water. This is normal.      MRI BRAIN W WO CONTRAST   Final Result      1. Markedly abnormal MR imaging appearance of the thalamic and brainstem structures, including involvement of the trena, left greater than right midbrain and left greater than right thalamic regions. There is some subtle diffusion restriction suggested in    the left thalamus, and there is also some subtle enhancement, ill-defined, involving the left thalamus, left midbrain and left cerebral peduncle region. Appearance is nonspecific, although the 2 main differential diagnoses would include posterior    reversible encephalopathy syndrome (MS ES), which can be associated with chemotherapeutic agents, with additional differential diagnostic consideration that of osmotic demyelination. Correlate with any pertinent electrolyte imbalances. Results are telephoned to the nurse caring for the patient, Erika Barragan, at 4:08 pm on 10/23/2018. XR CHEST STANDARD (2 VW)   Final Result      Streaky bilateral opacities likely relates to atelectasis and/or pneumonitis. CT ABDOMEN PELVIS W IV CONTRAST Additional Contrast? None   Final Result      No acute intra-abdominal or pelvic pathology. Multiple enlarged mesenteric lymph nodes and an enlarged spleen similar to prior examination. Small to moderate hiatal hernia      High density material in the gallbladder likely sludge and/or small stones. Areas of consolidation in the lower lungs and right middle lobe which are new when compared to prior exam                Assessment/Plan   1. 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 ABn MRI finding - PRES vs OD     3. Refractory PTCL, Stage IVb w/ BM involvement:     4. Chemo - DHAP  S/p 2 cycles - with cisplatin     5. Hyponatremia     Plan   - Ur studies show SIADH sec to CNS involvement (abn MRI)  - Salt wasting sec to Cisplatin is also possible but less likely   - on  2 % saline   - check sodium every 4 hrs  - Monitor renal function on Acyclovir and vanc  - Monitor Vanc levels   - IVF   - replace K and Mag as needed - loss is sec to cisplatin use     Will adjust dose of 2 % based on sodium levels.              Thank you for allowing us to participate in care of Vasquez Tirado MD  Feel free to contact me   [facility] [address]  Office : [phone]  Fax :[phone]

## 2018-10-27 NOTE — PLAN OF CARE
Problem: Falls - Risk of:  Goal: Will remain free from falls  Will remain free from falls    Outcome: Met This Shift   Pt is a High fall risk. See Shubham Horns Fall Score and ABCDS Injury Risk assessments.   + Screening for Orthostasis and/or + High Fall Risk per CASTORENA/ABCDS: Explained fall risk precautions to pt and family and rationale behind their use to keep the patient safe. Pt bed is in low position, side rails up, call light and belongings are in reach. Fall wristband applied and present on pts wrist.  Bed alarm on. Pt encouraged to call for assistance. Will continue with hourly rounds for PO intake, pain needs, toileting and repositioning as needed. Problem: Risk for Impaired Skin Integrity  Goal: Tissue integrity - skin and mucous membranes  Structural intactness and normal physiological function of skin and  mucous membranes. Outcome: Met This Shift  No skin breakdown noted this shift. Pt turned side to side q2h with pillow support. Problem: Confusion - Acute:  Goal: Absence of continued neurological deterioration signs and symptoms  Absence of continued neurological deterioration signs and symptoms   Outcome: Ongoing  See neuro assessment for details. Pt continues to be non-verbal this shift. Will occasionally follow commands. Problem: Injury - Risk of, Physical Injury:  Goal: Will remain free from falls  Will remain free from falls    Outcome: Met This Shift   Pt is a High fall risk. See Shubham Horns Fall Score and ABCDS Injury Risk assessments.   + Screening for Orthostasis and/or + High Fall Risk per CASTORENA/ABCDS: Explained fall risk precautions to pt and family and rationale behind their use to keep the patient safe. Pt bed is in low position, side rails up, call light and belongings are in reach. Fall wristband applied and present on pts wrist.  Bed alarm on. Pt encouraged to call for assistance. Will continue with hourly rounds for PO intake, pain needs, toileting and repositioning as needed.

## 2018-10-28 LAB
ALBUMIN SERPL-MCNC: 2.5 G/DL (ref 3.4–5)
ALP BLD-CCNC: 20 U/L (ref 40–129)
ALT SERPL-CCNC: 14 U/L (ref 10–40)
ANION GAP SERPL CALCULATED.3IONS-SCNC: 13 MMOL/L (ref 3–16)
AST SERPL-CCNC: 24 U/L (ref 15–37)
BANDED NEUTROPHILS RELATIVE PERCENT: 3 % (ref 0–7)
BASOPHILS ABSOLUTE: 0 K/UL (ref 0–0.2)
BASOPHILS RELATIVE PERCENT: 0 %
BILIRUB SERPL-MCNC: 0.4 MG/DL (ref 0–1)
BILIRUBIN DIRECT: <0.2 MG/DL (ref 0–0.3)
BILIRUBIN, INDIRECT: ABNORMAL MG/DL (ref 0–1)
BUN BLDV-MCNC: 17 MG/DL (ref 7–20)
CALCIUM SERPL-MCNC: 8.3 MG/DL (ref 8.3–10.6)
CHLORIDE BLD-SCNC: 93 MMOL/L (ref 99–110)
CO2: 24 MMOL/L (ref 21–32)
CREAT SERPL-MCNC: <0.5 MG/DL (ref 0.9–1.3)
EOSINOPHILS ABSOLUTE: 0 K/UL (ref 0–0.6)
EOSINOPHILS RELATIVE PERCENT: 0 %
GFR AFRICAN AMERICAN: >60
GFR NON-AFRICAN AMERICAN: >60
GLUCOSE BLD-MCNC: 121 MG/DL (ref 70–99)
HCT VFR BLD CALC: 22.7 % (ref 40.5–52.5)
HEMOGLOBIN: 7.8 G/DL (ref 13.5–17.5)
LYMPHOCYTES ABSOLUTE: 0.2 K/UL (ref 1–5.1)
LYMPHOCYTES RELATIVE PERCENT: 5 %
MAGNESIUM: 1.6 MG/DL (ref 1.8–2.4)
MCH RBC QN AUTO: 31.4 PG (ref 26–34)
MCHC RBC AUTO-ENTMCNC: 34.2 G/DL (ref 31–36)
MCV RBC AUTO: 91.6 FL (ref 80–100)
METAMYELOCYTES RELATIVE PERCENT: 1 %
METHOTREXATE LEVEL: 3 UMOL/L
MISCELLANEOUS LAB TEST ORDER: NORMAL
MISCELLANEOUS LAB TEST ORDER: NORMAL
MONOCYTES ABSOLUTE: 0.2 K/UL (ref 0–1.3)
MONOCYTES RELATIVE PERCENT: 4 %
MYELOCYTE PERCENT: 1 %
NEUTROPHILS ABSOLUTE: 4.4 K/UL (ref 1.7–7.7)
NEUTROPHILS RELATIVE PERCENT: 86 %
PDW BLD-RTO: 19.4 % (ref 12.4–15.4)
PH UA: 7
PH UA: 7.5
PH UA: 7.5
PH UA: 8
PHOSPHORUS: 4.4 MG/DL (ref 2.5–4.9)
PLATELET # BLD: 73 K/UL (ref 135–450)
PMV BLD AUTO: 7.5 FL (ref 5–10.5)
POTASSIUM SERPL-SCNC: 4.2 MMOL/L (ref 3.5–5.1)
RBC # BLD: 2.48 M/UL (ref 4.2–5.9)
SODIUM BLD-SCNC: 130 MMOL/L (ref 136–145)
SODIUM BLD-SCNC: 131 MMOL/L (ref 136–145)
SODIUM BLD-SCNC: 133 MMOL/L (ref 136–145)
TOTAL PROTEIN: 4.9 G/DL (ref 6.4–8.2)
URIC ACID, SERUM: 4.9 MG/DL (ref 3.5–7.2)
WBC # BLD: 4.8 K/UL (ref 4–11)

## 2018-10-28 PROCEDURE — 83520 IMMUNOASSAY QUANT NOS NONAB: CPT

## 2018-10-28 PROCEDURE — 80048 BASIC METABOLIC PNL TOTAL CA: CPT

## 2018-10-28 PROCEDURE — 2580000003 HC RX 258: Performed by: NURSE PRACTITIONER

## 2018-10-28 PROCEDURE — 6360000002 HC RX W HCPCS: Performed by: NURSE PRACTITIONER

## 2018-10-28 PROCEDURE — 83986 ASSAY PH BODY FLUID NOS: CPT

## 2018-10-28 PROCEDURE — 96413 CHEMO IV INFUSION 1 HR: CPT

## 2018-10-28 PROCEDURE — 2580000003 HC RX 258: Performed by: INTERNAL MEDICINE

## 2018-10-28 PROCEDURE — 6360000002 HC RX W HCPCS: Performed by: INTERNAL MEDICINE

## 2018-10-28 PROCEDURE — 96415 CHEMO IV INFUSION ADDL HR: CPT

## 2018-10-28 PROCEDURE — 83735 ASSAY OF MAGNESIUM: CPT

## 2018-10-28 PROCEDURE — 84550 ASSAY OF BLOOD/URIC ACID: CPT

## 2018-10-28 PROCEDURE — 2500000003 HC RX 250 WO HCPCS: Performed by: INTERNAL MEDICINE

## 2018-10-28 PROCEDURE — 2500000003 HC RX 250 WO HCPCS: Performed by: NURSE PRACTITIONER

## 2018-10-28 PROCEDURE — 84295 ASSAY OF SERUM SODIUM: CPT

## 2018-10-28 PROCEDURE — 2060000000 HC ICU INTERMEDIATE R&B

## 2018-10-28 PROCEDURE — 85025 COMPLETE CBC W/AUTO DIFF WBC: CPT

## 2018-10-28 PROCEDURE — S0028 INJECTION, FAMOTIDINE, 20 MG: HCPCS | Performed by: NURSE PRACTITIONER

## 2018-10-28 PROCEDURE — 80076 HEPATIC FUNCTION PANEL: CPT

## 2018-10-28 PROCEDURE — 36591 DRAW BLOOD OFF VENOUS DEVICE: CPT

## 2018-10-28 PROCEDURE — 84100 ASSAY OF PHOSPHORUS: CPT

## 2018-10-28 RX ADMIN — DEXAMETHASONE SODIUM PHOSPHATE 6 MG: 4 INJECTION, SOLUTION INTRAMUSCULAR; INTRAVENOUS at 23:01

## 2018-10-28 RX ADMIN — LEUCOVORIN CALCIUM 20 MG: 100 INJECTION, POWDER, LYOPHILIZED, FOR SOLUTION INTRAMUSCULAR; INTRAVENOUS at 11:47

## 2018-10-28 RX ADMIN — LEUCOVORIN CALCIUM 20 MG: 100 INJECTION, POWDER, LYOPHILIZED, FOR SOLUTION INTRAMUSCULAR; INTRAVENOUS at 17:34

## 2018-10-28 RX ADMIN — Medication 10 ML: at 08:53

## 2018-10-28 RX ADMIN — CYTARABINE 4000 MG: 100 INJECTION, SOLUTION INTRATHECAL; INTRAVENOUS; SUBCUTANEOUS at 12:26

## 2018-10-28 RX ADMIN — PROCHLORPERAZINE EDISYLATE 10 MG: 5 INJECTION INTRAMUSCULAR; INTRAVENOUS at 00:48

## 2018-10-28 RX ADMIN — DEXAMETHASONE SODIUM PHOSPHATE 2 DROP: 1 SOLUTION/ DROPS OPHTHALMIC at 23:12

## 2018-10-28 RX ADMIN — SODIUM BICARBONATE 150 ML/HR: 84 INJECTION, SOLUTION INTRAVENOUS at 11:44

## 2018-10-28 RX ADMIN — ENOXAPARIN SODIUM 40 MG: 40 INJECTION SUBCUTANEOUS at 21:18

## 2018-10-28 RX ADMIN — LEUCOVORIN CALCIUM 20 MG: 100 INJECTION, POWDER, LYOPHILIZED, FOR SOLUTION INTRAMUSCULAR; INTRAVENOUS at 23:30

## 2018-10-28 RX ADMIN — SODIUM BICARBONATE 150 ML/HR: 84 INJECTION, SOLUTION INTRAVENOUS at 03:09

## 2018-10-28 RX ADMIN — SODIUM CHLORIDE 15 ML: 900 IRRIGANT IRRIGATION at 05:55

## 2018-10-28 RX ADMIN — THIAMINE HYDROCHLORIDE 100 MG: 100 INJECTION, SOLUTION INTRAMUSCULAR; INTRAVENOUS at 08:45

## 2018-10-28 RX ADMIN — SODIUM BICARBONATE 150 ML/HR: 84 INJECTION, SOLUTION INTRAVENOUS at 20:38

## 2018-10-28 RX ADMIN — DEXAMETHASONE SODIUM PHOSPHATE 2 DROP: 1 SOLUTION/ DROPS OPHTHALMIC at 11:42

## 2018-10-28 RX ADMIN — CYTARABINE 4000 MG: 100 INJECTION, SOLUTION INTRATHECAL; INTRAVENOUS; SUBCUTANEOUS at 23:33

## 2018-10-28 RX ADMIN — DEXAMETHASONE SODIUM PHOSPHATE 6 MG: 4 INJECTION, SOLUTION INTRAMUSCULAR; INTRAVENOUS at 05:24

## 2018-10-28 RX ADMIN — Medication 10 ML: at 21:19

## 2018-10-28 RX ADMIN — LEUCOVORIN CALCIUM 20 MG: 100 INJECTION, POWDER, LYOPHILIZED, FOR SOLUTION INTRAMUSCULAR; INTRAVENOUS at 05:30

## 2018-10-28 RX ADMIN — Medication 20 MG: at 08:47

## 2018-10-28 RX ADMIN — DEXAMETHASONE SODIUM PHOSPHATE 2 DROP: 1 SOLUTION/ DROPS OPHTHALMIC at 05:24

## 2018-10-28 RX ADMIN — DEXAMETHASONE SODIUM PHOSPHATE 6 MG: 4 INJECTION, SOLUTION INTRAMUSCULAR; INTRAVENOUS at 12:17

## 2018-10-28 RX ADMIN — MAGNESIUM SULFATE HEPTAHYDRATE 2 G: 40 INJECTION, SOLUTION INTRAVENOUS at 05:30

## 2018-10-28 RX ADMIN — SODIUM CHLORIDE 15 ML: 900 IRRIGANT IRRIGATION at 21:19

## 2018-10-28 RX ADMIN — DEXAMETHASONE SODIUM PHOSPHATE 2 DROP: 1 SOLUTION/ DROPS OPHTHALMIC at 18:33

## 2018-10-28 RX ADMIN — ONDANSETRON 24 MG: 2 INJECTION INTRAMUSCULAR; INTRAVENOUS at 23:01

## 2018-10-28 RX ADMIN — DEXAMETHASONE SODIUM PHOSPHATE 6 MG: 4 INJECTION, SOLUTION INTRAMUSCULAR; INTRAVENOUS at 17:36

## 2018-10-28 RX ADMIN — Medication 20 MG: at 21:19

## 2018-10-28 ASSESSMENT — PAIN SCALES - GENERAL
PAINLEVEL_OUTOF10: 0

## 2018-10-28 ASSESSMENT — PAIN SCALES - PAIN ASSESSMENT IN ADVANCED DEMENTIA (PAINAD)
NEGVOCALIZATION: 0
NEGVOCALIZATION: 0
TOTALSCORE: 0
BODYLANGUAGE: 0
TOTALSCORE: 0
TOTALSCORE: 0
FACIALEXPRESSION: 0
NEGVOCALIZATION: 0
TOTALSCORE: 0
BODYLANGUAGE: 0
BODYLANGUAGE: 0
NEGVOCALIZATION: 0
BODYLANGUAGE: 0
CONSOLABILITY: 0
CONSOLABILITY: 0
BODYLANGUAGE: 0
BODYLANGUAGE: 0
FACIALEXPRESSION: 0
CONSOLABILITY: 0
CONSOLABILITY: 0
BREATHING: 0
TOTALSCORE: 0
NEGVOCALIZATION: 0
FACIALEXPRESSION: 0
FACIALEXPRESSION: 0
CONSOLABILITY: 0
NEGVOCALIZATION: 0
BREATHING: 0
BREATHING: 0
CONSOLABILITY: 0
FACIALEXPRESSION: 0
BREATHING: 0
TOTALSCORE: 0
BREATHING: 0
TOTALSCORE: 0
FACIALEXPRESSION: 0
BREATHING: 0
BODYLANGUAGE: 0
BREATHING: 0
CONSOLABILITY: 0
FACIALEXPRESSION: 0
NEGVOCALIZATION: 0

## 2018-10-28 NOTE — PLAN OF CARE
Problem: Falls - Risk of:  Goal: Will remain free from falls  Will remain free from falls    Outcome: Met This Shift   Pt is a High fall risk. See Isis Pond Fall Score and ABCDS Injury Risk assessments.   + Screening for Orthostasis and/or + High Fall Risk per CASTORENA/ABCDS: Explained fall risk precautions to pt and family and rationale behind their use to keep the patient safe. Pt bed is in low position, side rails up, call light and belongings are in reach. Fall wristband applied and present on pts wrist.  Bed alarm on.  Pt encouraged to call for assistance. Will continue with hourly rounds for PO intake, pain needs, toileting and repositioning as needed. Problem: Risk for Impaired Skin Integrity  Goal: Tissue integrity - skin and mucous membranes  Structural intactness and normal physiological function of skin and  mucous membranes. Outcome: Met This Shift  No skin breakdown noted this shift. Pt turned side to side q2h with pillow support. Pt also able to turn self in bed well.     Problem: Confusion - Acute:  Goal: Absence of continued neurological deterioration signs and symptoms  Absence of continued neurological deterioration signs and symptoms   Outcome: Ongoing  See neuro assessment for details. Pt will occasionally follow commands. Pt more alert this shift. Pt is oriented only to self.     Problem: Injury - Risk of, Physical Injury:  Goal: Will remain free from falls  Will remain free from falls    Outcome: Met This Shift   Pt is a High fall risk. See Isis Pond Fall Score and ABCDS Injury Risk assessments.   + Screening for Orthostasis and/or + High Fall Risk per CASTORENA/ABCDS: Explained fall risk precautions to pt and family and rationale behind their use to keep the patient safe. Pt bed is in low position, side rails up, call light and belongings are in reach. Fall wristband applied and present on pts wrist.  Bed alarm on.  Pt encouraged to call for assistance.  Will continue with hourly rounds for PO intake,

## 2018-10-28 NOTE — PROGRESS NOTES
Nephrology Progress Note    Sodium level improved to 130  Bicarb 24   Improvement in mental status.        Past Medical History:   Diagnosis Date    Anxiety     [address] [address] 06/2018    Peripheral T - Cell Lymphoma, Stage IVb    GERD (gastroesophageal reflux disease)     Hiatal hernia     History of blood transfusion     HTN (hypertension)        Past Surgical History:   Procedure Laterality Date    APPENDECTOMY      INGUINAL HERNIA REPAIR Right     UPPER GASTROINTESTINAL ENDOSCOPY N/A 8/29/2018    EGD BIOPSY performed by Arvind Whitfield MD at [facility] ENDOSCOPY       Family History   Problem Relation Age of Onset    Dementia Father          Current Medications:      leucovorin calcium (WELLCOVORIN) injection 20 mg Q6H   cytarabine (PF) (CYTARABINE) 4,000 mg in sodium chloride 0.9 % 250 mL chemo infusion 2 times per day   2 % Sodium Chloride Infusion 1000 ml Continuous   potassium chloride 20 mEq/50 mL IVPB (Central Line) PRN   magnesium sulfate 2 g in 50 mL IVPB premix PRN   famotidine (PEPCID) injection 20 mg BID   buPROPion (WELLBUTRIN) tablet 75 mg BID   sodium bicarbonate 75 mEq in sodium chloride 0.45 % 1,000 mL infusion Continuous   prochlorperazine (COMPAZINE) tablet 10 mg Q4H PRN   Or    prochlorperazine (COMPAZINE) injection 10 mg Q4H PRN   sodium bicarbonate 8.4 % injection 50 mEq PRN   leucovorin calcium (WELLCOVORIN) injection 150 mg PRN   furosemide (LASIX) injection 40 mg Q12H   ondansetron (ZOFRAN) injection 24 mg Q24H   dexamethasone (DECADRON) injection 6 mg Q6H   dexamethasone (DECADRON) 0.1 % ophthalmic solution 2 drop 4 times per day   enoxaparin (LOVENOX) injection 40 mg Nightly   thiamine (B-1) 100 mg in sodium chloride 0.9 % 100 mL IVPB Q24H   acetaminophen (TYLENOL) tablet 650 mg Q4H PRN   ondansetron (ZOFRAN) injection 8 mg Q8H PRN   Or    ondansetron (ZOFRAN) tablet 8 mg Q8H PRN   FLUoxetine (PROZAC) capsule 40 mg Daily   0.9 % sodium chloride infusion Continuous PRN   sodium chloride flush 0.9 % injection 10 mL 2 times per day   sodium chloride flush 0.9 % injection 10 mL PRN   potassium chloride 20 mEq/50 mL IVPB (Central Line) PRN   magnesium sulfate 4 g in 100 mL IVPB premix PRN   magnesium hydroxide (MILK OF MAGNESIA) 400 MG/5ML suspension 30 mL Daily PRN   Saline Mouthwash 15 mL 4x Daily AC & HS   Saline Mouthwash 15 mL Q4H PRN   alteplase (CATHFLO) injection 2 mg PRN           Physical exam:     Vitals:  /85   Pulse 62   Temp 97.4 °F (36.3 °C) (Temporal)   Resp 16   Ht 6' 1\" (1.854 m)   Wt 178 lb 5.6 oz (80.9 kg)   SpO2 98%   BMI 23.53 kg/m²   Constitutional:  AMS   Skin: no rash, turgor wnl  Heent:  eomi, mmm  Neck: no bruits or jvd noted  Cardiovascular:  S1, S2 without m/r/g  Respiratory: CTA B without w/r/r  Abdomen:  +bs, soft, nt, nd  Ext: no lower extremity edema  Psychiatric: mood and affect flat  Musculoskeletal:  Rom, muscular strength dec     Data:   Labs:  CBC:   Recent Labs      10/26/18   0332  10/27/18   0310  10/28/18   0300   WBC  6.1  5.1  4.8   HGB  7.7*  7.8*  7.8*   PLT  66*  69*  73*     BMP:    Recent Labs      10/26/18   1300   10/27/18   0310   10/27/18   1620  10/27/18   2140  10/28/18   0300   NA  122*   < >  127*  125*   < >  131*  132*  131*  130*   K  4.2   --   4.4  4.5   --    --    --   4.2   CL  89*   --   92*  91*   --    --    --   93*   CO2  24   --   24  24   --    --    --   24   BUN  8   --   8  8   --    --    --   17   CREATININE  <0.5*   --   <0.5*  <0.5*   --    --    --   <0.5*   GLUCOSE  84   --   101*  102*   --    --    --   121*    < > = values in this interval not displayed.      Ca/Mg/Phos:   Recent Labs      10/26/18   0332  10/26/18   1300  10/27/18   0310  10/28/18   0300   CALCIUM  8.2*  8.3  8.4  8.4  8.3   MG  1.10*   --   1.50*  1.60*   PHOS  3.1   --    --   4.4     Hepatic:   Recent Labs      10/26/18   0403

## 2018-10-28 NOTE — PLAN OF CARE
Problem: Falls - Risk of:  Goal: Will remain free from falls  Will remain free from falls    Outcome: Ongoing  High Fall Risk per CASTORENA/ABCDS: Explained fall risk precautions to pt and family and rationale behind their use to keep the patient safe. Pt bed is in low position, side rails up, call light and belongings are in reach. Fall wristband applied and present on pts wrist. Bed alarm on. Pt encouraged to call for assistance. Will continue with hourly rounds for PO intake, pain needs, toileting and repositioning as needed. Problem: Risk for Impaired Skin Integrity  Goal: Tissue integrity - skin and mucous membranes  Structural intactness and normal physiological function of skin and  mucous membranes. Outcome: Ongoing  Pt continues with a Miramontes catheter in place. Pt denies any discomfort at the insertion site. The insertion site is free from any s/s of infection. Pt repositioned q2h. Pillow support provided. Pt continues on a specialty low air loss mattress. No new skin issues noted. Will continue to monitor. Problem: Confusion - Acute:  Goal: Absence of continued neurological deterioration signs and symptoms  Absence of continued neurological deterioration signs and symptoms   Outcome: Ongoing  Pt continues to be lethargic, however pt is easier to arouse. When pt is awake, pt able to answer yes and no questions. Pt oriented to person only. RN continues to attempt to re-orient pt. See neuro assessment for further details. Bed alarm on. Pt makes no attempts to get OOB. Will continue to monitor closely. Problem: Injury - Risk of, Physical Injury:  Goal: Will remain free from falls  Will remain free from falls    Outcome: Ongoing  High Fall Risk per CASTORENA/ABCDS: Explained fall risk precautions to pt and family and rationale behind their use to keep the patient safe. Pt bed is in low position, side rails up, call light and belongings are in reach.  Fall wristband applied and present on pts wrist. Bed alarm on.  Pt encouraged to call for assistance. Will continue with hourly rounds for PO intake, pain needs, toileting and repositioning as needed. Problem: Bleeding:  Goal: Will show no signs and symptoms of excessive bleeding  Will show no signs and symptoms of excessive bleeding   Outcome: Ongoing  Patient's hemoglobin this AM:   Recent Labs      10/28/18   0300   HGB  7.8*     Patient's platelet count this AM:   Recent Labs      10/28/18   0300   PLT  73*     Patient showing no signs or symptoms of active bleeding. Transfusion is not indicated at this time. Patient verbalizes understanding of all instructions. Will continue to assess and implement POC. Call light within reach and hourly rounding in place. Problem: Pain:  Goal: Pain level will decrease  Pain level will decrease   Outcome: Ongoing  Pt denied any pain this shift. Will continue to assess for pain and monitor. Problem: Infection - Central Venous Catheter-Associated Bloodstream Infection:  Goal: Will show no infection signs and symptoms  Will show no infection signs and symptoms   Outcome: Ongoing  CVC site remains free of signs/symptoms of infection. No drainage, edema, erythema, pain, or warmth noted at site. Dressing changes continue per protocol and on an as needed basis - see flowsheet. Problem: Nausea/Vomiting:  Goal: Absence of nausea/vomiting  Absence of nausea/vomiting   Outcome: Ongoing  Pt did c/o nausea with dry heaving this shift. Pt continues on scheduled IV Zofran prior to chemotherapy. IV Compazine also given once this shift per PRN order and per pt request. Pt stated relief after medication administration. Will continue to monitor. Problem: Venous Thromboembolism:  Goal: Will show no signs or symptoms of venous thromboembolism  Will show no signs or symptoms of venous thromboembolism   Outcome: Ongoing  Adherent with DVT Prevention: Pt is at risk for DVT d/t decreased mobility and cancer treatment.   Pt educated on

## 2018-10-28 NOTE — PROGRESS NOTES
Administration: Chemotherapy drug Cytarabine independently verified with Steen Goodell RN prior to administration. Acknowledgement of informed consent for chemotherapy administration verified. Original order, appropriateness of regimen, drug supplied, height, weight, BSA, dose calculations, expiration dates/times, drug appearance, and two patient identifiers were verified by both RNs. Drug checked for vesicant/irritant status and for risk of hypersensitivity. Most recent laboratory values and allergies, were reviewed. Positive, brisk blood return via CVC was confirmed prior to administration. Chest x-ray for correct line placement reviewed. Leonor Gonzáles and Steen Goodell RN verified correct rate of chemotherapy and maintenance IV fluids. Patient was educated on chemotherapy regimen prior to administration including indication for treatment related to disease & side effects of chemotherapy drug. Patient verbalizes understanding of all instructions. Completion of Chemotherapy: Monitoring during infusion done per policy, see Flowsheets. Blood return verified before, during, and after infusion per policy; no signs of extravasation. Pt tolerated chemotherapy well and without incident. Chemotherapy infusion end time on the [facility] Will continue to monitor.

## 2018-10-29 ENCOUNTER — APPOINTMENT (OUTPATIENT)
Dept: GENERAL RADIOLOGY | Age: 58
DRG: 177 | End: 2018-10-29
Payer: COMMERCIAL

## 2018-10-29 LAB
ALBUMIN SERPL-MCNC: 2.6 G/DL (ref 3.4–5)
ALP BLD-CCNC: 21 U/L (ref 40–129)
ALT SERPL-CCNC: 26 U/L (ref 10–40)
ANION GAP SERPL CALCULATED.3IONS-SCNC: 13 MMOL/L (ref 3–16)
APTT: 36.3 SEC (ref 26–36)
AST SERPL-CCNC: 38 U/L (ref 15–37)
BASOPHILS ABSOLUTE: 0 K/UL (ref 0–0.2)
BASOPHILS RELATIVE PERCENT: 0 %
BILIRUB SERPL-MCNC: 0.4 MG/DL (ref 0–1)
BILIRUBIN DIRECT: <0.2 MG/DL (ref 0–0.3)
BILIRUBIN, INDIRECT: ABNORMAL MG/DL (ref 0–1)
BUN BLDV-MCNC: 15 MG/DL (ref 7–20)
CALCIUM SERPL-MCNC: 8.2 MG/DL (ref 8.3–10.6)
CHLORIDE BLD-SCNC: 97 MMOL/L (ref 99–110)
CO2: 24 MMOL/L (ref 21–32)
CREAT SERPL-MCNC: <0.5 MG/DL (ref 0.9–1.3)
EOSINOPHILS ABSOLUTE: 0 K/UL (ref 0–0.6)
EOSINOPHILS RELATIVE PERCENT: 0 %
GFR AFRICAN AMERICAN: >60
GFR NON-AFRICAN AMERICAN: >60
GLUCOSE BLD-MCNC: 114 MG/DL (ref 70–99)
HCT VFR BLD CALC: 22.1 % (ref 40.5–52.5)
HEMOGLOBIN: 7.6 G/DL (ref 13.5–17.5)
INR BLD: 2.17 (ref 0.86–1.14)
LACTATE DEHYDROGENASE: 339 U/L (ref 100–190)
LYMPHOCYTES ABSOLUTE: 0.2 K/UL (ref 1–5.1)
LYMPHOCYTES RELATIVE PERCENT: 5 %
MAGNESIUM: 1.7 MG/DL (ref 1.8–2.4)
MCH RBC QN AUTO: 31.7 PG (ref 26–34)
MCHC RBC AUTO-ENTMCNC: 34.6 G/DL (ref 31–36)
MCV RBC AUTO: 91.8 FL (ref 80–100)
METHOTREXATE LEVEL: 0.69 UMOL/L
MONOCYTES ABSOLUTE: 0.1 K/UL (ref 0–1.3)
MONOCYTES RELATIVE PERCENT: 2 %
NEUTROPHILS ABSOLUTE: 3.7 K/UL (ref 1.7–7.7)
NEUTROPHILS RELATIVE PERCENT: 93 %
PDW BLD-RTO: 19.1 % (ref 12.4–15.4)
PH UA: 8
PHOSPHORUS: 3.7 MG/DL (ref 2.5–4.9)
PLATELET # BLD: 86 K/UL (ref 135–450)
PMV BLD AUTO: 7.3 FL (ref 5–10.5)
POTASSIUM SERPL-SCNC: 3.9 MMOL/L (ref 3.5–5.1)
PROTHROMBIN TIME: 24.7 SEC (ref 9.8–13)
RBC # BLD: 2.4 M/UL (ref 4.2–5.9)
SODIUM BLD-SCNC: 132 MMOL/L (ref 136–145)
SODIUM BLD-SCNC: 134 MMOL/L (ref 136–145)
TOTAL PROTEIN: 4.6 G/DL (ref 6.4–8.2)
URIC ACID, SERUM: 5.1 MG/DL (ref 3.5–7.2)
WBC # BLD: 4 K/UL (ref 4–11)

## 2018-10-29 PROCEDURE — 83735 ASSAY OF MAGNESIUM: CPT

## 2018-10-29 PROCEDURE — 85025 COMPLETE CBC W/AUTO DIFF WBC: CPT

## 2018-10-29 PROCEDURE — 6360000002 HC RX W HCPCS: Performed by: INTERNAL MEDICINE

## 2018-10-29 PROCEDURE — 2580000003 HC RX 258: Performed by: INTERNAL MEDICINE

## 2018-10-29 PROCEDURE — 6360000002 HC RX W HCPCS: Performed by: NURSE PRACTITIONER

## 2018-10-29 PROCEDURE — 97530 THERAPEUTIC ACTIVITIES: CPT

## 2018-10-29 PROCEDURE — 71045 X-RAY EXAM CHEST 1 VIEW: CPT

## 2018-10-29 PROCEDURE — 83615 LACTATE (LD) (LDH) ENZYME: CPT

## 2018-10-29 PROCEDURE — 99221 1ST HOSP IP/OBS SF/LOW 40: CPT | Performed by: NURSE PRACTITIONER

## 2018-10-29 PROCEDURE — 80076 HEPATIC FUNCTION PANEL: CPT

## 2018-10-29 PROCEDURE — G8997 SWALLOW GOAL STATUS: HCPCS

## 2018-10-29 PROCEDURE — 97166 OT EVAL MOD COMPLEX 45 MIN: CPT

## 2018-10-29 PROCEDURE — S0028 INJECTION, FAMOTIDINE, 20 MG: HCPCS | Performed by: NURSE PRACTITIONER

## 2018-10-29 PROCEDURE — 2500000003 HC RX 250 WO HCPCS: Performed by: NURSE PRACTITIONER

## 2018-10-29 PROCEDURE — 36591 DRAW BLOOD OFF VENOUS DEVICE: CPT

## 2018-10-29 PROCEDURE — G8996 SWALLOW CURRENT STATUS: HCPCS

## 2018-10-29 PROCEDURE — G8979 MOBILITY GOAL STATUS: HCPCS

## 2018-10-29 PROCEDURE — 2500000003 HC RX 250 WO HCPCS: Performed by: INTERNAL MEDICINE

## 2018-10-29 PROCEDURE — 97163 PT EVAL HIGH COMPLEX 45 MIN: CPT

## 2018-10-29 PROCEDURE — 84100 ASSAY OF PHOSPHORUS: CPT

## 2018-10-29 PROCEDURE — 80048 BASIC METABOLIC PNL TOTAL CA: CPT

## 2018-10-29 PROCEDURE — 92610 EVALUATE SWALLOWING FUNCTION: CPT

## 2018-10-29 PROCEDURE — 83520 IMMUNOASSAY QUANT NOS NONAB: CPT

## 2018-10-29 PROCEDURE — 83986 ASSAY PH BODY FLUID NOS: CPT

## 2018-10-29 PROCEDURE — G8987 SELF CARE CURRENT STATUS: HCPCS

## 2018-10-29 PROCEDURE — 85730 THROMBOPLASTIN TIME PARTIAL: CPT

## 2018-10-29 PROCEDURE — 96413 CHEMO IV INFUSION 1 HR: CPT

## 2018-10-29 PROCEDURE — 2060000000 HC ICU INTERMEDIATE R&B

## 2018-10-29 PROCEDURE — G8988 SELF CARE GOAL STATUS: HCPCS

## 2018-10-29 PROCEDURE — 84295 ASSAY OF SERUM SODIUM: CPT

## 2018-10-29 PROCEDURE — 2580000003 HC RX 258: Performed by: NURSE PRACTITIONER

## 2018-10-29 PROCEDURE — G8978 MOBILITY CURRENT STATUS: HCPCS

## 2018-10-29 PROCEDURE — 97535 SELF CARE MNGMENT TRAINING: CPT

## 2018-10-29 PROCEDURE — 85610 PROTHROMBIN TIME: CPT

## 2018-10-29 PROCEDURE — 84550 ASSAY OF BLOOD/URIC ACID: CPT

## 2018-10-29 RX ORDER — DEXAMETHASONE SODIUM PHOSPHATE 4 MG/ML
4 INJECTION, SOLUTION INTRA-ARTICULAR; INTRALESIONAL; INTRAMUSCULAR; INTRAVENOUS; SOFT TISSUE EVERY 6 HOURS
Status: DISCONTINUED | OUTPATIENT
Start: 2018-10-29 | End: 2018-11-07 | Stop reason: HOSPADM

## 2018-10-29 RX ORDER — ONDANSETRON 2 MG/ML
4 INJECTION INTRAMUSCULAR; INTRAVENOUS DAILY
Status: DISCONTINUED | OUTPATIENT
Start: 2018-10-29 | End: 2018-11-01

## 2018-10-29 RX ADMIN — Medication 10 ML: at 20:00

## 2018-10-29 RX ADMIN — MAGNESIUM SULFATE HEPTAHYDRATE 2 G: 40 INJECTION, SOLUTION INTRAVENOUS at 09:59

## 2018-10-29 RX ADMIN — DEXAMETHASONE SODIUM PHOSPHATE 4 MG: 4 INJECTION, SOLUTION INTRAMUSCULAR; INTRAVENOUS at 23:41

## 2018-10-29 RX ADMIN — THIAMINE HYDROCHLORIDE 100 MG: 100 INJECTION, SOLUTION INTRAMUSCULAR; INTRAVENOUS at 09:02

## 2018-10-29 RX ADMIN — SODIUM BICARBONATE 150 ML/HR: 84 INJECTION, SOLUTION INTRAVENOUS at 13:16

## 2018-10-29 RX ADMIN — CYTARABINE 4000 MG: 100 INJECTION, SOLUTION INTRATHECAL; INTRAVENOUS; SUBCUTANEOUS at 11:28

## 2018-10-29 RX ADMIN — DEXAMETHASONE SODIUM PHOSPHATE 2 DROP: 1 SOLUTION/ DROPS OPHTHALMIC at 11:43

## 2018-10-29 RX ADMIN — LEUCOVORIN CALCIUM 20 MG: 100 INJECTION, POWDER, LYOPHILIZED, FOR SOLUTION INTRAMUSCULAR; INTRAVENOUS at 11:43

## 2018-10-29 RX ADMIN — DEXAMETHASONE SODIUM PHOSPHATE 4 MG: 4 INJECTION, SOLUTION INTRAMUSCULAR; INTRAVENOUS at 17:28

## 2018-10-29 RX ADMIN — SODIUM BICARBONATE 150 ML/HR: 84 INJECTION, SOLUTION INTRAVENOUS at 20:15

## 2018-10-29 RX ADMIN — SODIUM CHLORIDE: 234 INJECTION INTRAMUSCULAR; INTRAVENOUS; SUBCUTANEOUS at 03:42

## 2018-10-29 RX ADMIN — ONDANSETRON 8 MG: 2 INJECTION INTRAMUSCULAR; INTRAVENOUS at 15:05

## 2018-10-29 RX ADMIN — Medication 10 ML: at 09:27

## 2018-10-29 RX ADMIN — Medication 20 MG: at 09:27

## 2018-10-29 RX ADMIN — LEUCOVORIN CALCIUM 20 MG: 100 INJECTION, POWDER, LYOPHILIZED, FOR SOLUTION INTRAMUSCULAR; INTRAVENOUS at 17:28

## 2018-10-29 RX ADMIN — DEXAMETHASONE SODIUM PHOSPHATE 6 MG: 4 INJECTION, SOLUTION INTRAMUSCULAR; INTRAVENOUS at 05:29

## 2018-10-29 RX ADMIN — DEXAMETHASONE SODIUM PHOSPHATE 4 MG: 4 INJECTION, SOLUTION INTRAMUSCULAR; INTRAVENOUS at 11:44

## 2018-10-29 RX ADMIN — LEUCOVORIN CALCIUM 20 MG: 100 INJECTION, POWDER, LYOPHILIZED, FOR SOLUTION INTRAMUSCULAR; INTRAVENOUS at 23:41

## 2018-10-29 RX ADMIN — DEXAMETHASONE SODIUM PHOSPHATE 2 DROP: 1 SOLUTION/ DROPS OPHTHALMIC at 05:34

## 2018-10-29 RX ADMIN — POTASSIUM CHLORIDE 20 MEQ: 29.8 INJECTION, SOLUTION INTRAVENOUS at 06:40

## 2018-10-29 RX ADMIN — POTASSIUM CHLORIDE 20 MEQ: 29.8 INJECTION, SOLUTION INTRAVENOUS at 05:40

## 2018-10-29 RX ADMIN — ENOXAPARIN SODIUM 40 MG: 40 INJECTION SUBCUTANEOUS at 20:00

## 2018-10-29 RX ADMIN — SODIUM BICARBONATE 150 ML/HR: 84 INJECTION, SOLUTION INTRAVENOUS at 03:42

## 2018-10-29 RX ADMIN — LEUCOVORIN CALCIUM 20 MG: 100 INJECTION, POWDER, LYOPHILIZED, FOR SOLUTION INTRAMUSCULAR; INTRAVENOUS at 05:30

## 2018-10-29 RX ADMIN — DEXAMETHASONE SODIUM PHOSPHATE 2 DROP: 1 SOLUTION/ DROPS OPHTHALMIC at 17:29

## 2018-10-29 RX ADMIN — DEXAMETHASONE SODIUM PHOSPHATE 2 DROP: 1 SOLUTION/ DROPS OPHTHALMIC at 23:41

## 2018-10-29 RX ADMIN — Medication 20 MG: at 20:00

## 2018-10-29 ASSESSMENT — PAIN SCALES - PAIN ASSESSMENT IN ADVANCED DEMENTIA (PAINAD)
CONSOLABILITY: 0
BREATHING: 0
CONSOLABILITY: 0
NEGVOCALIZATION: 0
BODYLANGUAGE: 0
TOTALSCORE: 0
BREATHING: 0
CONSOLABILITY: 0
BODYLANGUAGE: 0
BODYLANGUAGE: 0
FACIALEXPRESSION: 0
BREATHING: 0
NEGVOCALIZATION: 0
TOTALSCORE: 0
FACIALEXPRESSION: 0
FACIALEXPRESSION: 0
NEGVOCALIZATION: 0
TOTALSCORE: 0

## 2018-10-29 ASSESSMENT — PAIN SCALES - GENERAL
PAINLEVEL_OUTOF10: 0

## 2018-10-29 NOTE — CONSULTS
[facility]  Palliative Medicine Consultation Note      Date Of Admission:10/20/2018  Date of consult: 10/29/18  Seen by [facility] in the past:  No    Recommendations:        Introduced palliative care to pt's wife Georgiana Bray. Pt answered some simple questions, denied pain or sob, c/o nausea. RN giving Gildardo Harmon now. Will continue to follow. 1. Goals of Care/Advanced Care planning/Code status: Full Code, did not discuss code status or goals of care today. 2. Pain: pt denies pain and wife reports not c/o pain. 3. SOB: pt denies sob and appears comfortable on room air. 4. Altered mental status: he is unable to answer orientation questions but can name his wife. He is able to answer some simple questions. Wife reports that his mental status is improved today. 5. Nausea/vomiting: pt c/o nausea and is gagging. Called RN to give antiemetic. Wife requests that we give an antiemetic daily since he cannot request it. Ordered zofran daily. 6. Disposition: to be determined    Reason for Consult:         __x___ Goals of Care  __x___ Code Status Discussion/Advanced Care Planning   __x___ Psychosocial/Family Support  _____ Symptom Management:   _____ Other Zackary Mosley)    Requesting Physician: Dr. Jessika ESPINAL    CHIEF COMPLAINT: Nausea    History Obtained From:  spouse, electronic medical record      History of Present Illness:         Mr. Rosamaria Campos is a 61year old male with PMH of anxiety and peripheral T-cell lymphoma stage IVb (diagnosed 5/2018, underwent CHOEP therapy 6/2018 and second cycle 7/2018, with CT showing persistent lymphadenopathy after second cycle) who presented 10/20 with nausea/vomiting and found to have bilateral LL infiltrates concerning for pneumonia. N/V improved after pt had a large BM. Neurology was consulted for vision changes, encephalopathy, and myoclonus and ID is following as well. He received cytarabine yesterday.  Pt has been lethargic and unable to take po today.    Subjective:                     Past Medical History:        Diagnosis Date    Anxiety     Cancer ([facility] [address] 2018    Peripheral T - Cell Lymphoma, Stage IVb    GERD (gastroesophageal reflux disease)     Hiatal hernia     History of blood transfusion     HTN (hypertension)        Past Surgical History:        Procedure Laterality Date    APPENDECTOMY      INGUINAL HERNIA REPAIR Right     UPPER GASTROINTESTINAL ENDOSCOPY N/A 2018    EGD BIOPSY performed by Ke Mijares MD at [facility] ENDOSCOPY       Current Medications:    Current Facility-Administered Medications: dexamethasone (DECADRON) injection 4 mg, 4 mg, Intravenous, Q6H  leucovorin calcium (WELLCOVORIN) injection 20 mg, 20 mg, Intravenous, Q6H  2 % Sodium Chloride Infusion 1000 ml, , Intravenous, Continuous  potassium chloride 20 mEq/50 mL IVPB (Central Line), 40 mEq, Intravenous, PRN  magnesium sulfate 2 g in 50 mL IVPB premix, 2 g, Intravenous, PRN  famotidine (PEPCID) injection 20 mg, 20 mg, Intravenous, BID  [] sodium bicarbonate 150 mEq in dextrose 5 % 1,000 mL infusion, 250 mL/hr, Intravenous, Continuous **FOLLOWED BY** sodium bicarbonate 75 mEq in sodium chloride 0.45 % 1,000 mL infusion, 150 mL/hr, Intravenous, Continuous  prochlorperazine (COMPAZINE) tablet 10 mg, 10 mg, Oral, Q4H PRN **OR** prochlorperazine (COMPAZINE) injection 10 mg, 10 mg, Intravenous, Q4H PRN  sodium bicarbonate 8.4 % injection 50 mEq, 50 mEq, Intravenous, PRN  leucovorin calcium (WELLCOVORIN) injection 150 mg, 150 mg, Intravenous, PRN  furosemide (LASIX) injection 40 mg, 40 mg, Intravenous, Q12H  dexamethasone (DECADRON) 0.1 % ophthalmic solution 2 drop, 2 drop, Both Eyes, 4 times per day  enoxaparin (LOVENOX) injection 40 mg, 40 mg, Subcutaneous, Nightly  acetaminophen (TYLENOL) tablet 650 mg, 650 mg, Oral, Q4H PRN  ondansetron (ZOFRAN) injection 8 mg, 8 mg, Intravenous, Q8H PRN **OR** ondansetron (ZOFRAN) tablet 8 mg, 8 mg, Oral, Q8H palliative care and in goals of care for the patient. Thank you to Dr. Norma Cancino for this consultation. We will continue to follow  Juno Carballout care as needed.       Bubba Farris NP  [facility]

## 2018-10-29 NOTE — PROGRESS NOTES
Multiple enlarged mesenteric lymph nodes and an enlarged spleen similar to prior examination.       Small to moderate hiatal hernia       High density material in the gallbladder likely sludge and/or small stones.       Areas of consolidation in the lower lungs and right middle lobe which are new when compared to prior exam       3. MRI brain w/wo contrast (10/23/18):  1. Markedly abnormal MR imaging appearance of the thalamic and brainstem structures, including involvement of the trena, left greater than right midbrain and left greater than right thalamic regions. There is some subtle diffusion restriction suggested in   the left thalamus, and there is also some subtle enhancement, ill-defined, involving the left thalamus, left midbrain and left cerebral peduncle region.  Appearance is nonspecific, although the 2 main differential diagnoses would include posterior   reversible encephalopathy syndrome ([place] ES), which can be associated with chemotherapeutic agents, with additional differential diagnostic consideration that of osmotic demyelination. Correlate with any pertinent electrolyte imbalances    Microbiology:  1.  CSF Meningitis PCR Panel (10/24/18): Pathology:  1.  CSF (10/24/18): Positive for malignant cells.     - Malignant cell population with features of lymphoid cells and flow       cytometry with atypical T lymphoid population.     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     - See case comment.     PROBLEM LIST:           1.  PTCL, Stage IVb w/ BM involvement (Dx 5/7/18) & CNS Involvement (10/24/18)  2. Depression / Anxiety  3.  GERD  4.  HTN  5. Neutropenic Fever (7/2/18)     6. Ototoxicity       TREATMENT:            1.  CHOEP   Cycle #1 - 6/22/18  Cycle #2 - 7/16/18   2.  DHAP  Cycle #1 - 8/31/18  Cycle #2 - 9/28/18   3. HD MTX / Nisa-C w/ Decadron   Cycle #1 - 10/26/18      ASSESSMENT AND PLAN:           1.  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 Concern for PRES or  osmotic demyelination  - LP (10/24/18): pathology consistent w/ lymphoma   - Meningitis Encephalitis Panel: Negative  - CSF studies - Pending,  viral, fungal, atypical bacteria (TB, listeria, whipples, etc), paraneoplastic panel, MARIELA virus   - Cont thiamine replacement, per neurology    8. Weakness:  Ongoing related to disease and encephalopathy   - Cont PT/OT      - DVT Prophylaxis: Platelets >14,665 cells/dL, - daily lovenox prophylaxis ordered  Contraindications to pharmacologic prophylaxis: None  Contraindications to mechanical prophylaxis: None    - Disposition: Once off IV abx, chemotx complete and overall stable       KYLIE Saavedra - DAKOTA Dougherty Che.  Vdihi Vences, DO, MS

## 2018-10-29 NOTE — PLAN OF CARE
Problem: Musculor/Skeletal Functional Status  Intervention: PT Evaluation/treatment  Increase independence with functional mobility and gait back to baseline.

## 2018-10-29 NOTE — PLAN OF CARE
Problem: Falls - Risk of:  Goal: Will remain free from falls  Will remain free from falls    Outcome: Ongoing  High Fall Risk per CASTORENA/ABCDS: Explained fall risk precautions to pt and family and rationale behind their use to keep the patient safe. Pt bed is in low position, side rails up, call light and belongings are in reach. Fall wristband applied and present on pts wrist. Bed alarm on.  Pt encouraged to call for assistance. Will continue with hourly rounds for PO intake, pain needs, toileting and repositioning as needed. Problem: Risk for Impaired Skin Integrity  Goal: Tissue integrity - skin and mucous membranes  Structural intactness and normal physiological function of skin and  mucous membranes. Outcome: Ongoing  Pt continues with a Miramontes catheter in place. Pt denies any discomfort at the insertion site. The insertion site is free from any s/s of infection. Pt repositioned q2h. Pillow support provided. Pt continues on a specialty low air loss mattress. No new skin issues noted. Will continue to monitor. Problem: Confusion - Acute:  Goal: Absence of continued neurological deterioration signs and symptoms  Absence of continued neurological deterioration signs and symptoms   Outcome: Ongoing  Pt continues to be lethargic. Pt is however easy to arouse. When pt is awake, pt able to answer yes and no questions. Pt oriented to person only. RN continues to attempt to re-orient pt. See neuro assessment for further details. Bed alarm on. Pt makes no attempts to get OOB. Will continue to monitor closely. Problem: Injury - Risk of, Physical Injury:  Goal: Will remain free from falls  Will remain free from falls    Outcome: Ongoing  High Fall Risk per CASTORENA/ABCDS: Explained fall risk precautions to pt and family and rationale behind their use to keep the patient safe. Pt bed is in low position, side rails up, call light and belongings are in reach.  Fall wristband applied and present on pts wrist. Bed alarm

## 2018-10-29 NOTE — PROGRESS NOTES
Administration: Chemotherapy drug Cytarabine independently verified with Gael Briggs RN prior to administration. Acknowledgement of informed consent for chemotherapy administration verified. Original order, appropriateness of regimen, drug supplied, height, weight, BSA, dose calculations, expiration dates/times, drug appearance, and two patient identifiers were verified by both RNs. Drug checked for vesicant/irritant status and for risk of hypersensitivity. Most recent laboratory values and allergies, were reviewed. Positive, brisk blood return via CVC was confirmed prior to administration. Chest x-ray for correct line placement reviewed. Lio Hernandez and Gale Briggs RN verified correct rate of chemotherapy and maintenance IV fluids. Patient was educated on chemotherapy regimen prior to administration including indication for treatment related to disease & side effects of chemotherapy drug. Patient verbalizes understanding of all instructions. Completion of Chemotherapy: Monitoring during infusion done per policy, see Flowsheets. Blood return verified before, during, and after infusion per policy; no signs of extravasation. Pt tolerated chemotherapy well and without incident. Chemotherapy infusion end time on the [facility] Will continue to monitor.

## 2018-10-29 NOTE — PROGRESS NOTES
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  ONSET DATE: 10/20/18    Recent Chest Xray 10/26/18  1. No acute disease. MRI head 10/23/18  1. Markedly abnormal MR imaging appearance of the thalamic and brainstem structures, including involvement of the trena, left greater than right midbrain and left greater than right thalamic regions. There is some subtle diffusion restriction suggested in    the left thalamus, and there is also some subtle enhancement, ill-defined, involving the left thalamus, left midbrain and left cerebral peduncle region.  Appearance is nonspecific, although the 2 main differential diagnoses would include posterior    reversible encephalopathy syndrome ([place] ES), which can be associated with chemotherapeutic agents, with additional differential diagnostic consideration that of osmotic demyelination. Correlate with any pertinent electrolyte imbalances.          Date of Eval: 10/29/2018  Evaluating Therapist: Lashonda Hawkins    Current Diet level:  Current Diet : NPO  Current Liquid Diet : NPO      Primary Complaint  Patient Complaint: pt unable to state    Pain:  Pain Assessment  Patient Currently in Pain: Other (comment) (did not respond in any way to question- did not appear to be in any pain or distress)      Reason for Referral  Kera Collins was referred for a bedside swallow evaluation to assess the efficiency of his swallow function, identify signs and symptoms of aspiration and make recommendations regarding safe dietary consistencies, effective compensatory strategies, and safe eating environment. Impression  Speech Therapy has attempted to see this pt 4x previously, but pt too lethargic to participate. Spoke to RN who stated pt is more alert this date and did respond to some of her questions. Pt sitting upright, eyes closed, moving head from side to side at times, unable to follow any commands or verbalize and made no attempt to communicate via any means during this assessment. Pt unable to cough or swallow on command. Oral- pt's tongue and mouth are moist and WFL. Pt does not appear to be missing any teeth- pt unable to open mouth fully on command to thoroughly assess. Pt unable to follow commands for oral motor assessment. No obvious droop noted. Ice chip placed to pt's mouth with gloved hand. Pt with minimal response to ice to lips. Placed ice chip on spoon in pt's mouth- pt did not attempt to close lips around the spoon, even with max cues. Placed ice chip in pt's mouth, but pt did not initiate mastication, even with max cues. Attempted to stimulate  lip closure and mastication by placing empty spoon in pt's mouth, but this was not successful. Pharyngeal- pt did not attempt to initiate swallow prior to or after ice chip was placed in his mouth. Suctioned ice chip from pt's oral cavity. No spontaneous swallowing of secretions observed at any time during this session. Pt is not able to take anything PO at this time. Pt would benefit from ongoing oral care with suction. Dysphagia Diagnosis: Suspected needs further assessment;Severe pharyngeal stage dysphagia; Severe oral stage dysphagia  Dysphagia Outcome Severity Scale: Level 1: Severe dysphagia- NPO. Unable to tolerate any PO safely     Treatment Plan  Requires SLP Intervention: Yes  Duration/Frequency of Treatment: 3-5x  D/C Recommendations:  To be determined       Recommended Diet and Intervention  Diet Solids

## 2018-10-29 NOTE — PROGRESS NOTES
Physical Therapy    Facility/Department: [address] [facility]  Initial Assessment and Treatment    NAME: Nicole Tomlinson  : 1960  MRN: [id number]    Date of Service: 10/29/2018    Discharge Recommendations:  Igor Posada [facility] scored a  on the AM-PAC short mobility form. Current research shows that an AM-PAC score of 17 or less is typically not associated with a discharge to the patient's home setting. Based on the patients AM-PAC score and their current functional mobility deficits, it is recommended that the patient have 3-5 sessions per week of Physical Therapy at d/c to increase the patients independence. PT Equipment Recommendations  Equipment Needed:  (Defer)      Restrictions  Up as tolerated , airborne precautions     Vision/Hearing  Vision: Impaired (glasses with eye patch over R eye)  Hearing: Within functional limits       Subjective  Chart Reviewed: Yes  Additional Pertinent Hx: Pt admitted 10/20 with nausea and vomiting x4 days. Abd/pelvic CT (-). CXR: atelectasis and/or pneumonitis. Head MRI: abnormal MR imaging appearance of the thalamic and brainstem structures. 10/23 s/p LP. PMH:  anxiety, CA, GERD, hiatal hernia, HTN  Diagnosis: PNA    Subjective  Subjective: Pt found supine in bed, soundly sleeping. Pt with minimal conversation once awake. Noted increased time needed to answer questions. \"I know what happened. \"  Pain Screening  Patient Currently in Pain: Denies    Orientation  Impaired  Orientation Level: Oriented to person;Disoriented to time;Disoriented to place; Disoriented to situation    Social/Functional History  Lives With: Spouse  Type of Home: House  ADL Assistance: Independent  Homemaking Assistance: Independent  Ambulation Assistance: Independent  Transfer Assistance: Independent  Occupation: Full time employment (Hematology professor)  Additional Comments: Information obtained from chair, pt unable to give social history. goals  Time Frame for Short term goals: Discharge  Short term goal 1: bed mobility SBA  Short term goal 2: sit <> stand SBA  Short term goal 3: bed <> chair SBA  Short term goal 4: ambulate 50ft with rolling walker SBA  Patient Goals   Patient goals : Unable to state    Therapy Time   Individual Concurrent Group Co-treatment   Time In 0800         Time Out 0838         Minutes 38         Timed Code Treatment Minutes:  25  Total Treatment Minutes:  [address] [facility] 21, PT

## 2018-10-30 ENCOUNTER — APPOINTMENT (OUTPATIENT)
Dept: GENERAL RADIOLOGY | Age: 58
DRG: 177 | End: 2018-10-30
Payer: COMMERCIAL

## 2018-10-30 ENCOUNTER — APPOINTMENT (OUTPATIENT)
Dept: CT IMAGING | Age: 58
DRG: 177 | End: 2018-10-30
Payer: COMMERCIAL

## 2018-10-30 LAB
ALBUMIN SERPL-MCNC: 3 G/DL (ref 3.4–5)
ALP BLD-CCNC: 27 U/L (ref 40–129)
ALT SERPL-CCNC: 333 U/L (ref 10–40)
ANION GAP SERPL CALCULATED.3IONS-SCNC: 13 MMOL/L (ref 3–16)
APPEARANCE CSF: ABNORMAL
AST SERPL-CCNC: 457 U/L (ref 15–37)
BASOPHILS ABSOLUTE: 0.1 K/UL (ref 0–0.2)
BASOPHILS RELATIVE PERCENT: 1.5 %
BILIRUB SERPL-MCNC: 0.7 MG/DL (ref 0–1)
BILIRUBIN DIRECT: <0.2 MG/DL (ref 0–0.3)
BILIRUBIN, INDIRECT: ABNORMAL MG/DL (ref 0–1)
BLOOD BANK DISPENSE STATUS: NORMAL
BLOOD BANK DISPENSE STATUS: NORMAL
BLOOD BANK PRODUCT CODE: NORMAL
BLOOD BANK PRODUCT CODE: NORMAL
BPU ID: NORMAL
BPU ID: NORMAL
BUN BLDV-MCNC: 14 MG/DL (ref 7–20)
CALCIUM SERPL-MCNC: 8.6 MG/DL (ref 8.3–10.6)
CHLORIDE BLD-SCNC: 95 MMOL/L (ref 99–110)
CLOT EVALUATION CSF: ABNORMAL
CO2: 23 MMOL/L (ref 21–32)
COLOR CSF: COLORLESS
CREAT SERPL-MCNC: <0.5 MG/DL (ref 0.9–1.3)
DESCRIPTION BLOOD BANK: NORMAL
DESCRIPTION BLOOD BANK: NORMAL
EOSINOPHILS ABSOLUTE: 0 K/UL (ref 0–0.6)
EOSINOPHILS RELATIVE PERCENT: 0 %
GFR AFRICAN AMERICAN: >60
GFR NON-AFRICAN AMERICAN: >60
GLUCOSE BLD-MCNC: 96 MG/DL (ref 70–99)
GLUCOSE, CSF: 28 MG/DL (ref 40–80)
HCT VFR BLD CALC: 21.7 % (ref 40.5–52.5)
HEMOGLOBIN: 7.4 G/DL (ref 13.5–17.5)
INR BLD: 1.46 (ref 0.86–1.14)
LACTIC ACID: 0.9 MMOL/L (ref 0.4–2)
LYMPHOCYTES ABSOLUTE: 0.1 K/UL (ref 1–5.1)
LYMPHOCYTES RELATIVE PERCENT: 1.9 %
LYMPHS CSF: 1 % (ref 40–80)
MAGNESIUM: 1.7 MG/DL (ref 1.8–2.4)
MCH RBC QN AUTO: 31.1 PG (ref 26–34)
MCHC RBC AUTO-ENTMCNC: 33.9 G/DL (ref 31–36)
MCV RBC AUTO: 91.9 FL (ref 80–100)
METHOTREXATE LEVEL: 0.29 UMOL/L
MONOCYTE, CSF: 2 % (ref 15–45)
MONOCYTES ABSOLUTE: 0 K/UL (ref 0–1.3)
MONOCYTES RELATIVE PERCENT: 0.2 %
NEUTROPHILS ABSOLUTE: 5.2 K/UL (ref 1.7–7.7)
NEUTROPHILS RELATIVE PERCENT: 96.4 %
NEUTROPHILS, CSF: 97 % (ref 0–6)
NUMBER OF CELLS CSF: 100
PDW BLD-RTO: 19.1 % (ref 12.4–15.4)
PH UA: 7.5
PH UA: 8
PH UA: 8.5
PHOSPHORUS: 3.1 MG/DL (ref 2.5–4.9)
PLATELET # BLD: 82 K/UL (ref 135–450)
PMV BLD AUTO: 6.9 FL (ref 5–10.5)
POTASSIUM SERPL-SCNC: 4.1 MMOL/L (ref 3.5–5.1)
PROTEIN CSF: 94 MG/DL (ref 15–45)
PROTHROMBIN TIME: 16.6 SEC (ref 9.8–13)
RBC # BLD: 2.36 M/UL (ref 4.2–5.9)
RBC CSF: 13 /CUMM
SLIDE REVIEW: ABNORMAL
SODIUM BLD-SCNC: 127 MMOL/L (ref 136–145)
SODIUM BLD-SCNC: 130 MMOL/L (ref 136–145)
SODIUM BLD-SCNC: 130 MMOL/L (ref 136–145)
SODIUM BLD-SCNC: 131 MMOL/L (ref 136–145)
SODIUM BLD-SCNC: 131 MMOL/L (ref 136–145)
TOTAL PROTEIN: 5.1 G/DL (ref 6.4–8.2)
TUBE NUMBER CSF: ABNORMAL
URIC ACID, SERUM: 4.1 MG/DL (ref 3.5–7.2)
WBC # BLD: 5.4 K/UL (ref 4–11)
WBC CSF: 866 /CUMM (ref 0–5)

## 2018-10-30 PROCEDURE — 83605 ASSAY OF LACTIC ACID: CPT

## 2018-10-30 PROCEDURE — 82945 GLUCOSE OTHER FLUID: CPT

## 2018-10-30 PROCEDURE — 89051 BODY FLUID CELL COUNT: CPT

## 2018-10-30 PROCEDURE — 84550 ASSAY OF BLOOD/URIC ACID: CPT

## 2018-10-30 PROCEDURE — 2580000003 HC RX 258: Performed by: NURSE PRACTITIONER

## 2018-10-30 PROCEDURE — 2580000003 HC RX 258: Performed by: INTERNAL MEDICINE

## 2018-10-30 PROCEDURE — 80076 HEPATIC FUNCTION PANEL: CPT

## 2018-10-30 PROCEDURE — P9017 PLASMA 1 DONOR FRZ W/IN 8 HR: HCPCS

## 2018-10-30 PROCEDURE — 84157 ASSAY OF PROTEIN OTHER: CPT

## 2018-10-30 PROCEDURE — S0028 INJECTION, FAMOTIDINE, 20 MG: HCPCS | Performed by: NURSE PRACTITIONER

## 2018-10-30 PROCEDURE — 84100 ASSAY OF PHOSPHORUS: CPT

## 2018-10-30 PROCEDURE — 6370000000 HC RX 637 (ALT 250 FOR IP): Performed by: NURSE PRACTITIONER

## 2018-10-30 PROCEDURE — 6360000002 HC RX W HCPCS: Performed by: NURSE PRACTITIONER

## 2018-10-30 PROCEDURE — 87103 BLOOD FUNGUS CULTURE: CPT

## 2018-10-30 PROCEDURE — 87040 BLOOD CULTURE FOR BACTERIA: CPT

## 2018-10-30 PROCEDURE — 6360000002 HC RX W HCPCS: Performed by: INTERNAL MEDICINE

## 2018-10-30 PROCEDURE — 84295 ASSAY OF SERUM SODIUM: CPT

## 2018-10-30 PROCEDURE — 80048 BASIC METABOLIC PNL TOTAL CA: CPT

## 2018-10-30 PROCEDURE — 83986 ASSAY PH BODY FLUID NOS: CPT

## 2018-10-30 PROCEDURE — 87253 VIRUS INOCULATE TISSUE ADDL: CPT

## 2018-10-30 PROCEDURE — 36430 TRANSFUSION BLD/BLD COMPNT: CPT

## 2018-10-30 PROCEDURE — 71045 X-RAY EXAM CHEST 1 VIEW: CPT

## 2018-10-30 PROCEDURE — 44500 INTRO GASTROINTESTINAL TUBE: CPT

## 2018-10-30 PROCEDURE — 3E0R305 INTRODUCTION OF OTHER ANTINEOPLASTIC INTO SPINAL CANAL, PERCUTANEOUS APPROACH: ICD-10-PCS | Performed by: RADIOLOGY

## 2018-10-30 PROCEDURE — 009U3ZX DRAINAGE OF SPINAL CANAL, PERCUTANEOUS APPROACH, DIAGNOSTIC: ICD-10-PCS | Performed by: RADIOLOGY

## 2018-10-30 PROCEDURE — 70450 CT HEAD/BRAIN W/O DYE: CPT

## 2018-10-30 PROCEDURE — 2500000003 HC RX 250 WO HCPCS: Performed by: NURSE PRACTITIONER

## 2018-10-30 PROCEDURE — 83520 IMMUNOASSAY QUANT NOS NONAB: CPT

## 2018-10-30 PROCEDURE — 87070 CULTURE OTHR SPECIMN AEROBIC: CPT

## 2018-10-30 PROCEDURE — 83735 ASSAY OF MAGNESIUM: CPT

## 2018-10-30 PROCEDURE — 2500000003 HC RX 250 WO HCPCS: Performed by: INTERNAL MEDICINE

## 2018-10-30 PROCEDURE — 87102 FUNGUS ISOLATION CULTURE: CPT

## 2018-10-30 PROCEDURE — 87205 SMEAR GRAM STAIN: CPT

## 2018-10-30 PROCEDURE — 85025 COMPLETE CBC W/AUTO DIFF WBC: CPT

## 2018-10-30 PROCEDURE — 87252 VIRUS INOCULATION TISSUE: CPT

## 2018-10-30 PROCEDURE — 85610 PROTHROMBIN TIME: CPT

## 2018-10-30 PROCEDURE — 2060000000 HC ICU INTERMEDIATE R&B

## 2018-10-30 PROCEDURE — 36592 COLLECT BLOOD FROM PICC: CPT

## 2018-10-30 PROCEDURE — 0DH67UZ INSERTION OF FEEDING DEVICE INTO STOMACH, VIA NATURAL OR ARTIFICIAL OPENING: ICD-10-PCS | Performed by: RADIOLOGY

## 2018-10-30 PROCEDURE — 62270 DX LMBR SPI PNXR: CPT

## 2018-10-30 PROCEDURE — 88112 CYTOPATH CELL ENHANCE TECH: CPT

## 2018-10-30 PROCEDURE — 87086 URINE CULTURE/COLONY COUNT: CPT

## 2018-10-30 RX ORDER — LIDOCAINE HYDROCHLORIDE 10 MG/ML
5 INJECTION, SOLUTION EPIDURAL; INFILTRATION; INTRACAUDAL; PERINEURAL ONCE
Status: COMPLETED | OUTPATIENT
Start: 2018-10-30 | End: 2018-10-30

## 2018-10-30 RX ORDER — 0.9 % SODIUM CHLORIDE 0.9 %
250 INTRAVENOUS SOLUTION INTRAVENOUS ONCE
Status: COMPLETED | OUTPATIENT
Start: 2018-10-30 | End: 2018-10-30

## 2018-10-30 RX ORDER — ACETAMINOPHEN 325 MG/1
650 TABLET ORAL EVERY 4 HOURS PRN
Status: DISCONTINUED | OUTPATIENT
Start: 2018-10-30 | End: 2018-10-30 | Stop reason: SDUPTHER

## 2018-10-30 RX ADMIN — Medication 20 MG: at 10:47

## 2018-10-30 RX ADMIN — DEXAMETHASONE SODIUM PHOSPHATE 2 DROP: 1 SOLUTION/ DROPS OPHTHALMIC at 17:41

## 2018-10-30 RX ADMIN — MEROPENEM 1 G: 1 INJECTION, POWDER, FOR SOLUTION INTRAVENOUS at 19:43

## 2018-10-30 RX ADMIN — DEXAMETHASONE SODIUM PHOSPHATE 4 MG: 4 INJECTION, SOLUTION INTRAMUSCULAR; INTRAVENOUS at 11:43

## 2018-10-30 RX ADMIN — LEUCOVORIN CALCIUM 20 MG: 100 INJECTION, POWDER, LYOPHILIZED, FOR SOLUTION INTRAMUSCULAR; INTRAVENOUS at 17:29

## 2018-10-30 RX ADMIN — ENOXAPARIN SODIUM 40 MG: 40 INJECTION SUBCUTANEOUS at 19:44

## 2018-10-30 RX ADMIN — ONDANSETRON 4 MG: 2 INJECTION INTRAMUSCULAR; INTRAVENOUS at 10:47

## 2018-10-30 RX ADMIN — Medication 10 ML: at 08:54

## 2018-10-30 RX ADMIN — SODIUM CHLORIDE 250 ML: 9 INJECTION, SOLUTION INTRAVENOUS at 11:31

## 2018-10-30 RX ADMIN — Medication 10 ML: at 19:46

## 2018-10-30 RX ADMIN — DEXAMETHASONE SODIUM PHOSPHATE 4 MG: 4 INJECTION, SOLUTION INTRAMUSCULAR; INTRAVENOUS at 23:39

## 2018-10-30 RX ADMIN — LEUCOVORIN CALCIUM 20 MG: 100 INJECTION, POWDER, LYOPHILIZED, FOR SOLUTION INTRAMUSCULAR; INTRAVENOUS at 10:48

## 2018-10-30 RX ADMIN — SODIUM BICARBONATE 150 ML/HR: 84 INJECTION, SOLUTION INTRAVENOUS at 03:45

## 2018-10-30 RX ADMIN — DEXAMETHASONE SODIUM PHOSPHATE 2 DROP: 1 SOLUTION/ DROPS OPHTHALMIC at 05:24

## 2018-10-30 RX ADMIN — DEXAMETHASONE SODIUM PHOSPHATE 2 DROP: 1 SOLUTION/ DROPS OPHTHALMIC at 23:39

## 2018-10-30 RX ADMIN — SODIUM CHLORIDE 100 MG: 9 INJECTION INTRAMUSCULAR; INTRAVENOUS; SUBCUTANEOUS at 16:09

## 2018-10-30 RX ADMIN — DEXAMETHASONE SODIUM PHOSPHATE 4 MG: 4 INJECTION, SOLUTION INTRAMUSCULAR; INTRAVENOUS at 05:22

## 2018-10-30 RX ADMIN — LIDOCAINE HYDROCHLORIDE 5 ML: 10 INJECTION, SOLUTION EPIDURAL; INFILTRATION; INTRACAUDAL; PERINEURAL at 16:08

## 2018-10-30 RX ADMIN — ACETAMINOPHEN 650 MG: 325 TABLET, FILM COATED ORAL at 15:25

## 2018-10-30 RX ADMIN — MEROPENEM 1 G: 1 INJECTION, POWDER, FOR SOLUTION INTRAVENOUS at 11:44

## 2018-10-30 RX ADMIN — Medication 20 MG: at 20:46

## 2018-10-30 RX ADMIN — MAGNESIUM SULFATE HEPTAHYDRATE 2 G: 40 INJECTION, SOLUTION INTRAVENOUS at 05:33

## 2018-10-30 RX ADMIN — LEUCOVORIN CALCIUM 20 MG: 100 INJECTION, POWDER, LYOPHILIZED, FOR SOLUTION INTRAMUSCULAR; INTRAVENOUS at 23:39

## 2018-10-30 RX ADMIN — DEXAMETHASONE SODIUM PHOSPHATE 2 DROP: 1 SOLUTION/ DROPS OPHTHALMIC at 11:43

## 2018-10-30 RX ADMIN — TBO-FILGRASTIM 300 MCG: 300 INJECTION, SOLUTION SUBCUTANEOUS at 17:04

## 2018-10-30 RX ADMIN — LEUCOVORIN CALCIUM 20 MG: 100 INJECTION, POWDER, LYOPHILIZED, FOR SOLUTION INTRAMUSCULAR; INTRAVENOUS at 05:22

## 2018-10-30 RX ADMIN — DEXAMETHASONE SODIUM PHOSPHATE 4 MG: 4 INJECTION, SOLUTION INTRAMUSCULAR; INTRAVENOUS at 17:03

## 2018-10-30 ASSESSMENT — PAIN SCALES - PAIN ASSESSMENT IN ADVANCED DEMENTIA (PAINAD)
BODYLANGUAGE: 0
CONSOLABILITY: 0
BREATHING: 0
TOTALSCORE: 0
FACIALEXPRESSION: 0
NEGVOCALIZATION: 0

## 2018-10-30 ASSESSMENT — PAIN SCALES - GENERAL
PAINLEVEL_OUTOF10: 0

## 2018-10-30 NOTE — PROGRESS NOTES
Nephrology Progress Note    Sodium stable at 131   On 2 percent saline   Good UO   Cr stable       Past Medical History:   Diagnosis Date    Anxiety     Cancer ([address] 06/2018    Peripheral T - Cell Lymphoma, Stage IVb    GERD (gastroesophageal reflux disease)     Hiatal hernia     History of blood transfusion     HTN (hypertension)        Past Surgical History:   Procedure Laterality Date    APPENDECTOMY      INGUINAL HERNIA REPAIR Right     UPPER GASTROINTESTINAL ENDOSCOPY N/A 8/29/2018    EGD BIOPSY performed by Ke Mijares MD at [facility] ENDOSCOPY       Family History   Problem Relation Age of Onset    Dementia Father          Current Medications:      dexamethasone (DECADRON) injection 4 mg Q6H   ondansetron (ZOFRAN) injection 4 mg Daily   leucovorin calcium (WELLCOVORIN) injection 20 mg Q6H   2 % Sodium Chloride Infusion 1000 ml Continuous   potassium chloride 20 mEq/50 mL IVPB (Central Line) PRN   magnesium sulfate 2 g in 50 mL IVPB premix PRN   famotidine (PEPCID) injection 20 mg BID   sodium bicarbonate 75 mEq in sodium chloride 0.45 % 1,000 mL infusion Continuous   prochlorperazine (COMPAZINE) tablet 10 mg Q4H PRN   Or    prochlorperazine (COMPAZINE) injection 10 mg Q4H PRN   sodium bicarbonate 8.4 % injection 50 mEq PRN   leucovorin calcium (WELLCOVORIN) injection 150 mg PRN   furosemide (LASIX) injection 40 mg Q12H   dexamethasone (DECADRON) 0.1 % ophthalmic solution 2 drop 4 times per day   enoxaparin (LOVENOX) injection 40 mg Nightly   acetaminophen (TYLENOL) tablet 650 mg Q4H PRN   ondansetron (ZOFRAN) injection 8 mg Q8H PRN   Or    ondansetron (ZOFRAN) tablet 8 mg Q8H PRN   FLUoxetine (PROZAC) capsule 40 mg Daily   0.9 % sodium chloride infusion Continuous PRN   sodium chloride flush 0.9 % injection 10 mL 2 times per day   sodium chloride flush 0.9 % injection 10 mL PRN   potassium chloride 20 mEq/50 mL HDL, LDLCALC, LABVLDL in the last 72 hours. ABGs: No results for input(s): PHART, PO2ART, NEQ2ZOQ in the last 72 hours. INR:   Recent Labs      10/29/18   0300   INR  2.17*     UA:  Recent Labs      10/30/18   0300   PHUR  8.0      Urine Microscopic: No results for input(s): LABCAST, BACTERIA, COMU, HYALCAST, WBCUA, RBCUA, EPIU in the last 72 hours. Urine Culture: No results for input(s): LABURIN in the last 72 hours. Urine Chemistry:   No results for input(s): Libby Law, PROTEINUR, NAUR in the last 72 hours. IMAGING:  XR CHEST PORTABLE   Final Result      1. No acute disease. FL LUMBAR PUNCTURE DIAG   Final Result   1. Technically successful fluoroscopically guided diagnostic lumbar puncture at the L3-L4 level as described. Opening pressure is measured as 15 cm of water. This is normal.      MRI BRAIN W WO CONTRAST   Final Result      1. Markedly abnormal MR imaging appearance of the thalamic and brainstem structures, including involvement of the trena, left greater than right midbrain and left greater than right thalamic regions. There is some subtle diffusion restriction suggested in    the left thalamus, and there is also some subtle enhancement, ill-defined, involving the left thalamus, left midbrain and left cerebral peduncle region. Appearance is nonspecific, although the 2 main differential diagnoses would include posterior    reversible encephalopathy syndrome ([place] ES), which can be associated with chemotherapeutic agents, with additional differential diagnostic consideration that of osmotic demyelination. Correlate with any pertinent electrolyte imbalances. Results are telephoned to the nurse caring for the patient, Erika Barragan, at 4:08 pm on 10/23/2018. XR CHEST STANDARD (2 VW)   Final Result      Streaky bilateral opacities likely relates to atelectasis and/or pneumonitis.       CT ABDOMEN PELVIS W IV CONTRAST Additional Contrast? None   Final Result      No acute

## 2018-10-30 NOTE — PROGRESS NOTES
involvement & CNS Involvement:   - CHOEP x 2 cycles w/ persistent lymphadenopathy on CT scan (refractory disease) (8/22/18)   - EGD Jenni Martin, 8/29/18) - Negative, no diagnostic alterations   - CT abd/pelvis (9/21/18): Interval decrease in multiple abdominal lymph nodes, persistent splenomegaly.  - S/p DHAP x 2 cycles, now w/ CNS involvement        - CSF (10/24/18) - consistent w/ NHL  - Cont systemic HD MTX / Nisa-C w/ CNS dosing of IV decadron 6mg q6hrs, 4 mg q6hrs (10/29/18), cont to taper   - Mental status has not improved w/ chemotherapy or steroids. Palliative care was consulted, but will need to discuss goals of care w/ family     Cycle #1, Day + 5    MTX levels:  10/28/18 - 3.0    10/29/18 - 0.69  10/30/18 - 0.29  10/31/18 - Next level     2. ID: Afebrile, no evidence of infection   - Blood cx (10/22/18) - NGTD   - LP (10/24/18):  Encephalitis panel PCR negative, Gram stain negative, crypto neg  - ID consulted, appreciate recs. Recommends stopping abx  - TB Mantoux test pending and quantiferon gold (10/24/18) - Pending  - Resume antimicrobials once neutropenic     Abx History:  Ceftriaxone Day + 3  (stopped 10/26/18)  Vancomycin  Day + 3  (stopped 10/26/18)  IV Acyclovir Day + 3 (stopped 10/26/18)    3. Heme: Anemia & thrombocytopenia from chemotherapy  - Transfuse for Hgb < 7 and Platelets < 10 K.  - No transfusion today     4. Nausea/Abdominal Pain:  Ongoing gagging and nausea   - Cont Zofran prn & IV Pepcid 20 mg bid     5. Metabolic:  hypoNa ongoing d/t SIADH w/ stable renal fxn stable and weight   - Renal following (Dr. Armstead Homans), appreciate recs; Dr Devan Alicea covering for Dr. 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     6.   Moderate malnutrition:  Poor r/t altered MS and unable to swallow  - Dietary to follow   - NPO and not receiving TF because unable to place Corepak on 10/26/18  - Consider starting TPN     7.  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 Concern for PRES or  osmotic demyelination  - LP (10/24/18): pathology consistent w/ lymphoma   - Meningitis Encephalitis Panel: Negative  - CSF studies - Pending,  viral, fungal, atypical bacteria (TB, listeria, whipples, etc), paraneoplastic panel, MARIELA virus   - S/p thiamine replacement (stopped 10/29/18)     8. Weakness:  Ongoing related to disease and encephalopathy   - Unable to participate in PT/OT    9. Elevated LFTs:  Likely chemotherapy induced  - Cont to follow daily       - DVT Prophylaxis: Platelets >45,113 cells/dL, - daily lovenox prophylaxis ordered  Contraindications to pharmacologic prophylaxis: None  Contraindications to mechanical prophylaxis: None    - Disposition: Once off IV abx, chemotx complete and overall stable         KYLIE Sherwood - DAKOTA Rene.  Esperanza Lemus DO, MS

## 2018-10-30 NOTE — PROGRESS NOTES
hours. Lipids: No results for input(s): CHOL, TRIG, HDL, LDLCALC, LABVLDL in the last 72 hours. ABGs: No results for input(s): PHART, PO2ART, [id number] in the last 72 hours. INR:   Recent Labs      10/29/18   0300   INR  2.17*     UA:  Recent Labs      10/29/18   2000   PHUR  8.0      Urine Microscopic: No results for input(s): LABCAST, BACTERIA, COMU, HYALCAST, WBCUA, RBCUA, EPIU in the last 72 hours. Urine Culture: No results for input(s): LABURIN in the last 72 hours. Urine Chemistry:   No results for input(s): Paty Perks, PROTEINUR, NAUR in the last 72 hours. IMAGING:  XR CHEST PORTABLE   Final Result      1. No acute disease. FL LUMBAR PUNCTURE DIAG   Final Result   1. Technically successful fluoroscopically guided diagnostic lumbar puncture at the L3-L4 level as described. Opening pressure is measured as 15 cm of water. This is normal.      MRI BRAIN W WO CONTRAST   Final Result      1. Markedly abnormal MR imaging appearance of the thalamic and brainstem structures, including involvement of the trena, left greater than right midbrain and left greater than right thalamic regions. There is some subtle diffusion restriction suggested in    the left thalamus, and there is also some subtle enhancement, ill-defined, involving the left thalamus, left midbrain and left cerebral peduncle region. Appearance is nonspecific, although the 2 main differential diagnoses would include posterior    reversible encephalopathy syndrome (MD ES), which can be associated with chemotherapeutic agents, with additional differential diagnostic consideration that of osmotic demyelination. Correlate with any pertinent electrolyte imbalances. Results are telephoned to the nurse caring for the patient, Raleigh Scott, at 4:08 pm on 10/23/2018. XR CHEST STANDARD (2 VW)   Final Result      Streaky bilateral opacities likely relates to atelectasis and/or pneumonitis.       CT ABDOMEN PELVIS W IV CONTRAST

## 2018-10-30 NOTE — PROGRESS NOTES
present. Spouse states pt had nausea yesterday and denies abd pain, vomiting/diarrhea. Pt is still NPO. Pt not appropriate for swallow re-eval at this time noted. Unable to place Corpak, consider re-attempting Corpak noted. Unable to start TPN at this time d/t elevated LFTs per team. +5.8 L since adm noted. Weight fluctuations anticipated on diuretic therapy. · Nutrition-Focused Physical Findings: non-pitting RLE (foot only) edema; stool 1 x recorded from 10/27-28; weakness; AMS  · Wound Type: None  · Current Nutrition Therapies:  · Oral Diet Orders: NPO   · Oral Diet intake: NPO  · Oral Nutrition Supplement (ONS) Orders: None  · Anthropometric Measures:  · Ht: 6' 1\" (185.4 cm)   · Current Body Wt: 174 lb (78.9 kg)  · Admission Body Wt: 175 lb 11.3 oz (79.7 kg)  · Usual Body Wt: 230 lb (104.3 kg)  · % Weight Change: 18%,  x 4 months   · Ideal Body Wt: 184 lb (83.5 kg),    · BMI Classification: BMI 18.5 - 24.9 Normal Weight  · Comparative Standards (Estimated Nutrition Needs):  · Estimated Daily Total Kcal: 8299-5909  · Estimated Daily Protein (g):     Estimated Intake vs Estimated Needs: Intake Less Than Needs    Nutrition Risk Level: High    Nutrition Interventions:   Continue NPO, Start Tube Feeding  Continued Inpatient Monitoring    Nutrition Evaluation:   · Evaluation: No progress toward goals   · Goals: Pt will tolerate EN until po diet can be advanced w/ consistent po intake of 75% or more of meals     · Monitoring: NPO Status, Diet Progression, TF Intake, TF Tolerance, Weight, Pertinent Labs    See Adult Nutrition Doc Flowsheet for more detail.      Electronically signed by Ash Diallo RD, LD on 10/30/18 at 11:48 AM    Contact Number: [phone]

## 2018-10-31 ENCOUNTER — APPOINTMENT (OUTPATIENT)
Dept: GENERAL RADIOLOGY | Age: 58
DRG: 177 | End: 2018-10-31
Payer: COMMERCIAL

## 2018-10-31 LAB
ALBUMIN SERPL-MCNC: 3.8 G/DL (ref 3.4–5)
ALP BLD-CCNC: 41 U/L (ref 40–129)
ALT SERPL-CCNC: 598 U/L (ref 10–40)
ANION GAP SERPL CALCULATED.3IONS-SCNC: 16 MMOL/L (ref 3–16)
AST SERPL-CCNC: 511 U/L (ref 15–37)
BANDED NEUTROPHILS RELATIVE PERCENT: 3 % (ref 0–7)
BASOPHILS ABSOLUTE: 0 K/UL (ref 0–0.2)
BASOPHILS RELATIVE PERCENT: 0 %
BILIRUB SERPL-MCNC: 1.1 MG/DL (ref 0–1)
BILIRUBIN DIRECT: 0.3 MG/DL (ref 0–0.3)
BILIRUBIN, INDIRECT: 0.8 MG/DL (ref 0–1)
BUN BLDV-MCNC: 15 MG/DL (ref 7–20)
CALCIUM SERPL-MCNC: 8.8 MG/DL (ref 8.3–10.6)
CHLORIDE BLD-SCNC: 93 MMOL/L (ref 99–110)
CO2: 21 MMOL/L (ref 21–32)
CREAT SERPL-MCNC: <0.5 MG/DL (ref 0.9–1.3)
CSF CULTURE: NORMAL
EOSINOPHILS ABSOLUTE: 0 K/UL (ref 0–0.6)
EOSINOPHILS RELATIVE PERCENT: 0 %
GFR AFRICAN AMERICAN: >60
GFR NON-AFRICAN AMERICAN: >60
GLUCOSE BLD-MCNC: 100 MG/DL (ref 70–99)
GRAM STAIN RESULT: NORMAL
HCT VFR BLD CALC: 23.1 % (ref 40.5–52.5)
HEMOGLOBIN: 8 G/DL (ref 13.5–17.5)
INR BLD: 1.59 (ref 0.86–1.14)
LACTATE DEHYDROGENASE: 577 U/L (ref 100–190)
LYMPHOCYTES ABSOLUTE: 0.1 K/UL (ref 1–5.1)
LYMPHOCYTES RELATIVE PERCENT: 1 %
MAGNESIUM: 1.6 MG/DL (ref 1.8–2.4)
MCH RBC QN AUTO: 31.5 PG (ref 26–34)
MCHC RBC AUTO-ENTMCNC: 34.5 G/DL (ref 31–36)
MCV RBC AUTO: 91.1 FL (ref 80–100)
METHOTREXATE LEVEL: 0.16 UMOL/L
MONOCYTES ABSOLUTE: 0.1 K/UL (ref 0–1.3)
MONOCYTES RELATIVE PERCENT: 1 %
NEUTROPHILS ABSOLUTE: 10.6 K/UL (ref 1.7–7.7)
NEUTROPHILS RELATIVE PERCENT: 95 %
PDW BLD-RTO: 18.6 % (ref 12.4–15.4)
PH UA: 6
PH UA: 7
PH UA: 8
PHOSPHORUS: 3 MG/DL (ref 2.5–4.9)
PLATELET # BLD: 89 K/UL (ref 135–450)
PMV BLD AUTO: 6.8 FL (ref 5–10.5)
POTASSIUM SERPL-SCNC: 3.6 MMOL/L (ref 3.5–5.1)
PROTHROMBIN TIME: 18.1 SEC (ref 9.8–13)
RBC # BLD: 2.54 M/UL (ref 4.2–5.9)
SODIUM BLD-SCNC: 130 MMOL/L (ref 136–145)
SODIUM BLD-SCNC: 130 MMOL/L (ref 136–145)
SODIUM BLD-SCNC: 132 MMOL/L (ref 136–145)
SODIUM BLD-SCNC: 132 MMOL/L (ref 136–145)
SODIUM BLD-SCNC: 135 MMOL/L (ref 136–145)
TOTAL PROTEIN: 5.8 G/DL (ref 6.4–8.2)
URIC ACID, SERUM: 2.7 MG/DL (ref 3.5–7.2)
URINE CULTURE, ROUTINE: NORMAL
WBC # BLD: 10.8 K/UL (ref 4–11)

## 2018-10-31 PROCEDURE — 83986 ASSAY PH BODY FLUID NOS: CPT

## 2018-10-31 PROCEDURE — 85610 PROTHROMBIN TIME: CPT

## 2018-10-31 PROCEDURE — 83735 ASSAY OF MAGNESIUM: CPT

## 2018-10-31 PROCEDURE — 83615 LACTATE (LD) (LDH) ENZYME: CPT

## 2018-10-31 PROCEDURE — 84295 ASSAY OF SERUM SODIUM: CPT

## 2018-10-31 PROCEDURE — 6360000002 HC RX W HCPCS: Performed by: INTERNAL MEDICINE

## 2018-10-31 PROCEDURE — 2580000003 HC RX 258: Performed by: NURSE PRACTITIONER

## 2018-10-31 PROCEDURE — 2500000003 HC RX 250 WO HCPCS: Performed by: INTERNAL MEDICINE

## 2018-10-31 PROCEDURE — 6370000000 HC RX 637 (ALT 250 FOR IP): Performed by: NURSE PRACTITIONER

## 2018-10-31 PROCEDURE — 6360000002 HC RX W HCPCS: Performed by: NURSE PRACTITIONER

## 2018-10-31 PROCEDURE — 84100 ASSAY OF PHOSPHORUS: CPT

## 2018-10-31 PROCEDURE — 71045 X-RAY EXAM CHEST 1 VIEW: CPT

## 2018-10-31 PROCEDURE — 2580000003 HC RX 258: Performed by: INTERNAL MEDICINE

## 2018-10-31 PROCEDURE — 36592 COLLECT BLOOD FROM PICC: CPT

## 2018-10-31 PROCEDURE — 83520 IMMUNOASSAY QUANT NOS NONAB: CPT

## 2018-10-31 PROCEDURE — 80076 HEPATIC FUNCTION PANEL: CPT

## 2018-10-31 PROCEDURE — 85025 COMPLETE CBC W/AUTO DIFF WBC: CPT

## 2018-10-31 PROCEDURE — 80048 BASIC METABOLIC PNL TOTAL CA: CPT

## 2018-10-31 PROCEDURE — 6370000000 HC RX 637 (ALT 250 FOR IP): Performed by: INTERNAL MEDICINE

## 2018-10-31 PROCEDURE — 2500000003 HC RX 250 WO HCPCS: Performed by: NURSE PRACTITIONER

## 2018-10-31 PROCEDURE — S0028 INJECTION, FAMOTIDINE, 20 MG: HCPCS | Performed by: NURSE PRACTITIONER

## 2018-10-31 PROCEDURE — 99231 SBSQ HOSP IP/OBS SF/LOW 25: CPT | Performed by: NURSE PRACTITIONER

## 2018-10-31 PROCEDURE — 84550 ASSAY OF BLOOD/URIC ACID: CPT

## 2018-10-31 PROCEDURE — 2060000000 HC ICU INTERMEDIATE R&B

## 2018-10-31 RX ORDER — SCOLOPAMINE TRANSDERMAL SYSTEM 1 MG/1
1 PATCH, EXTENDED RELEASE TRANSDERMAL
Status: DISCONTINUED | OUTPATIENT
Start: 2018-10-31 | End: 2018-11-08 | Stop reason: HOSPADM

## 2018-10-31 RX ADMIN — POTASSIUM CHLORIDE 20 MEQ: 29.8 INJECTION, SOLUTION INTRAVENOUS at 07:13

## 2018-10-31 RX ADMIN — LEUCOVORIN CALCIUM 20 MG: 100 INJECTION, POWDER, LYOPHILIZED, FOR SOLUTION INTRAMUSCULAR; INTRAVENOUS at 05:54

## 2018-10-31 RX ADMIN — DEXAMETHASONE SODIUM PHOSPHATE 4 MG: 4 INJECTION, SOLUTION INTRAMUSCULAR; INTRAVENOUS at 17:51

## 2018-10-31 RX ADMIN — MEROPENEM 1 G: 1 INJECTION, POWDER, FOR SOLUTION INTRAVENOUS at 03:06

## 2018-10-31 RX ADMIN — Medication 10 ML: at 19:54

## 2018-10-31 RX ADMIN — ACETAMINOPHEN 650 MG: 325 TABLET, FILM COATED ORAL at 03:06

## 2018-10-31 RX ADMIN — DEXAMETHASONE SODIUM PHOSPHATE 4 MG: 4 INJECTION, SOLUTION INTRAMUSCULAR; INTRAVENOUS at 05:54

## 2018-10-31 RX ADMIN — SODIUM CHLORIDE: 234 INJECTION INTRAMUSCULAR; INTRAVENOUS; SUBCUTANEOUS at 15:01

## 2018-10-31 RX ADMIN — LEUCOVORIN CALCIUM 20 MG: 100 INJECTION, POWDER, LYOPHILIZED, FOR SOLUTION INTRAMUSCULAR; INTRAVENOUS at 23:54

## 2018-10-31 RX ADMIN — POTASSIUM CHLORIDE 20 MEQ: 29.8 INJECTION, SOLUTION INTRAVENOUS at 08:34

## 2018-10-31 RX ADMIN — DEXAMETHASONE SODIUM PHOSPHATE 2 DROP: 1 SOLUTION/ DROPS OPHTHALMIC at 05:54

## 2018-10-31 RX ADMIN — DEXAMETHASONE SODIUM PHOSPHATE 2 DROP: 1 SOLUTION/ DROPS OPHTHALMIC at 11:18

## 2018-10-31 RX ADMIN — DEXAMETHASONE SODIUM PHOSPHATE 2 DROP: 1 SOLUTION/ DROPS OPHTHALMIC at 23:54

## 2018-10-31 RX ADMIN — ONDANSETRON 4 MG: 2 INJECTION INTRAMUSCULAR; INTRAVENOUS at 08:35

## 2018-10-31 RX ADMIN — MEROPENEM 1 G: 1 INJECTION, POWDER, FOR SOLUTION INTRAVENOUS at 11:16

## 2018-10-31 RX ADMIN — MAGNESIUM SULFATE HEPTAHYDRATE 2 G: 40 INJECTION, SOLUTION INTRAVENOUS at 04:14

## 2018-10-31 RX ADMIN — DEXAMETHASONE SODIUM PHOSPHATE 2 DROP: 1 SOLUTION/ DROPS OPHTHALMIC at 18:05

## 2018-10-31 RX ADMIN — Medication 10 ML: at 08:34

## 2018-10-31 RX ADMIN — SODIUM BICARBONATE: 84 INJECTION, SOLUTION INTRAVENOUS at 01:02

## 2018-10-31 RX ADMIN — DEXAMETHASONE SODIUM PHOSPHATE 4 MG: 4 INJECTION, SOLUTION INTRAMUSCULAR; INTRAVENOUS at 23:54

## 2018-10-31 RX ADMIN — ENOXAPARIN SODIUM 40 MG: 40 INJECTION SUBCUTANEOUS at 19:54

## 2018-10-31 RX ADMIN — ACETAMINOPHEN 650 MG: 325 TABLET, FILM COATED ORAL at 23:50

## 2018-10-31 RX ADMIN — MEROPENEM 1 G: 1 INJECTION, POWDER, FOR SOLUTION INTRAVENOUS at 19:54

## 2018-10-31 RX ADMIN — TBO-FILGRASTIM 300 MCG: 300 INJECTION, SOLUTION SUBCUTANEOUS at 17:58

## 2018-10-31 RX ADMIN — LEUCOVORIN CALCIUM 20 MG: 100 INJECTION, POWDER, LYOPHILIZED, FOR SOLUTION INTRAMUSCULAR; INTRAVENOUS at 17:51

## 2018-10-31 RX ADMIN — LEUCOVORIN CALCIUM 20 MG: 100 INJECTION, POWDER, LYOPHILIZED, FOR SOLUTION INTRAMUSCULAR; INTRAVENOUS at 11:18

## 2018-10-31 RX ADMIN — ACETAMINOPHEN 650 MG: 325 TABLET, FILM COATED ORAL at 19:50

## 2018-10-31 RX ADMIN — DEXAMETHASONE SODIUM PHOSPHATE 4 MG: 4 INJECTION, SOLUTION INTRAMUSCULAR; INTRAVENOUS at 11:17

## 2018-10-31 RX ADMIN — SODIUM BICARBONATE: 84 INJECTION, SOLUTION INTRAVENOUS at 12:21

## 2018-10-31 RX ADMIN — Medication 20 MG: at 19:54

## 2018-10-31 RX ADMIN — Medication 20 MG: at 08:34

## 2018-10-31 ASSESSMENT — PAIN SCALES - PAIN ASSESSMENT IN ADVANCED DEMENTIA (PAINAD)
CONSOLABILITY: 0
NEGVOCALIZATION: 0
BREATHING: 0
BODYLANGUAGE: 0
TOTALSCORE: 0
FACIALEXPRESSION: 0
TOTALSCORE: 0
NEGVOCALIZATION: 0
FACIALEXPRESSION: 0
CONSOLABILITY: 0
NEGVOCALIZATION: 0
FACIALEXPRESSION: 0
FACIALEXPRESSION: 0
BODYLANGUAGE: 0
BODYLANGUAGE: 0
TOTALSCORE: 0
CONSOLABILITY: 0
TOTALSCORE: 0
BREATHING: 0
CONSOLABILITY: 0
NEGVOCALIZATION: 0
NEGVOCALIZATION: 0
FACIALEXPRESSION: 0
CONSOLABILITY: 0
BODYLANGUAGE: 0
TOTALSCORE: 0
BODYLANGUAGE: 0
BREATHING: 0

## 2018-10-31 ASSESSMENT — PAIN SCALES - GENERAL
PAINLEVEL_OUTOF10: 0

## 2018-10-31 NOTE — PROGRESS NOTES
(79 kg)       General: awake but not following commands and unable to follow a simple conversation   HEENT: normocephalic, disconjugate gaze. no scleral erythema or icterus,   NECK: supple without palpable adenopathy  SKIN: warm dry and intact without lesions rashes or masses  CHEST: CTA bilaterally without use of accessory muscles  CV: Normal S1 S2, RRR, no MRG  ABD: NT ND normoactive BS, no palpable masses or hepatosplenomegaly  EXTREMITIES: without edema, denies calf tenderness  NEURO: unable to eval   CATHETER: Right PAC (IR , 6/21/18) - No erythema or tenderness    Data    CBC:   Recent Labs      10/29/18   0300  10/30/18   0300  10/31/18   0300   WBC  4.0  5.4  10.8   HGB  7.6*  7.4*  8.0*   HCT  22.1*  21.7*  23.1*   MCV  91.8  91.9  91.1   PLT  86*  82*  89*     BMP/Mag:  Recent Labs      10/29/18   0300   10/30/18   0300   10/30/18   1707  10/30/18   2200  10/31/18   0300   NA  134*   < >  131*  131*   < >  127*  130*  130*  130*   K  3.9   --   4.1   --    --    --   3.6   CL  97*   --   95*   --    --    --   93*   CO2  24   --   23   --    --    --   21   PHOS  3.7   --   3.1   --    --    --   3.0   BUN  15   --   14   --    --    --   15   CREATININE  <0.5*   --   <0.5*   --    --    --   <0.5*   MG  1.70*   --   1.70*   --    --    --   1.60*    < > = values in this interval not displayed. LIVP:   Recent Labs      10/29/18   0300  10/30/18   0300  10/31/18   0300   AST  38*  457*  511*   ALT  26  333*  598*   BILIDIR  <0.2  <0.2  0.3   BILITOT  0.4  0.7  1.1*   ALKPHOS  21*  27*  41     Coags:   Recent Labs      10/29/18   0300  10/30/18   1243  10/31/18   0300   PROTIME  24.7*  16.6*  18.1*   INR  2.17*  1.46*  1.59*   APTT  36.3*   --    --      Uric Acid   Recent Labs      10/29/18   0300  10/30/18   0300  10/31/18   0300   LABURIC  5.1  4.1  2.7*     Diagnostics:  1. CXray (10/21/18)     Streaky bilateral opacities likely relates to atelectasis and/or pneumonitis       2.   CT A/P (10/21/18)  No acute intra-abdominal or pelvic pathology.       Multiple enlarged mesenteric lymph nodes and an enlarged spleen similar to prior examination.       Small to moderate hiatal hernia       High density material in the gallbladder likely sludge and/or small stones.       Areas of consolidation in the lower lungs and right middle lobe which are new when compared to prior exam       3. MRI brain w/wo contrast (10/23/18):  1. Markedly abnormal MR imaging appearance of the thalamic and brainstem structures, including involvement of the rtena, left greater than right midbrain and left greater than right thalamic regions. There is some subtle diffusion restriction suggested in   the left thalamus, and there is also some subtle enhancement, ill-defined, involving the left thalamus, left midbrain and left cerebral peduncle region.  Appearance is nonspecific, although the 2 main differential diagnoses would include posterior   reversible encephalopathy syndrome (IA ES), which can be associated with chemotherapeutic agents, with additional differential diagnostic consideration that of osmotic demyelination. Correlate with any pertinent electrolyte imbalances    Microbiology:  1.  CSF Meningitis PCR Panel (10/24/18): Pathology:  1.  CSF (10/24/18): Positive for malignant cells.     - Malignant cell population with features of lymphoid cells and flow       cytometry with atypical T lymphoid population.     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     - See case comment.     PROBLEM LIST:           1.  PTCL, Stage IVb w/ BM involvement (Dx 5/7/18) & CNS Involvement (10/24/18)  2. Depression / Anxiety  3.  GERD  4.  HTN  5. Neutropenic Fever (7/2/18)     6. Ototoxicity       TREATMENT:            1.  CHOEP   Cycle #1 - 6/22/18  Cycle #2 - 7/16/18   2.  DHAP  Cycle #1 - 8/31/18  Cycle #2 - 9/28/18   3.   HD MTX / Nisa-C w/ Decadron   Cycle #1 - 10/26/18

## 2018-10-31 NOTE — PROGRESS NOTES
the last 72 hours. Lipids: No results for input(s): CHOL, TRIG, HDL, LDLCALC, LABVLDL in the last 72 hours. ABGs: No results for input(s): PHART, PO2ART, YJU5IGN in the last 72 hours. INR:   Recent Labs      10/29/18   0300  10/30/18   1243  10/31/18   0300   INR  2.17*  1.46*  1.59*     UA:  Recent Labs      10/31/18   0830   PHUR  8.0      Urine Microscopic: No results for input(s): LABCAST, BACTERIA, COMU, HYALCAST, WBCUA, RBCUA, EPIU in the last 72 hours. Urine Culture:   Recent Labs      10/30/18   1240   LABURIN  No growth at 18-36 hours     Urine Chemistry:   No results for input(s): CLUR, LABCREA, PROTEINUR, NAUR in the last 72 hours. IMAGING:  FL INTRO LONG GI TUBE   Final Result   Impression: Technically successful fluoroscopically guided placement of a nasogastric tube with the catheter tip residing in the region of the gastric pylorus. Total fluoroscopy time was 1.5 minutes. Total number of fluoroscopic images is 1. FL LUMBAR PUNCTURE DIAG   Final Result      XR CHEST PORTABLE   Final Result      No acute pulmonary pathology or change from the prior study                  CT Head WO Contrast   Final Result      Abnormal signal in the brainstem and basal ganglia better evaluated on recent MRI. XR CHEST PORTABLE   Final Result      1. No acute disease. FL LUMBAR PUNCTURE DIAG   Final Result   1. Technically successful fluoroscopically guided diagnostic lumbar puncture at the L3-L4 level as described. Opening pressure is measured as 15 cm of water. This is normal.      MRI BRAIN W WO CONTRAST   Final Result      1. Markedly abnormal MR imaging appearance of the thalamic and brainstem structures, including involvement of the trena, left greater than right midbrain and left greater than right thalamic regions.  There is some subtle diffusion restriction suggested in    the left thalamus, and there is also some subtle enhancement, ill-defined, involving the left

## 2018-10-31 NOTE — PROGRESS NOTES
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 *    Pt/family [phone]  Time spent with patient and/or family: 10  Time reviewing records: 5  Time communicating with providers: 5    A total of 20 minutes spent with the patient and family on unit greater than 50% face to face time in counseling regarding palliative care and goals of care for the patient.      Eliazar Santos NP  [address] [facility], [address]

## 2018-11-01 ENCOUNTER — APPOINTMENT (OUTPATIENT)
Dept: GENERAL RADIOLOGY | Age: 58
DRG: 177 | End: 2018-11-01
Payer: COMMERCIAL

## 2018-11-01 LAB
ABO/RH: NORMAL
ALBUMIN SERPL-MCNC: 3.3 G/DL (ref 3.4–5)
ALP BLD-CCNC: 39 U/L (ref 40–129)
ALT SERPL-CCNC: 385 U/L (ref 10–40)
ANION GAP SERPL CALCULATED.3IONS-SCNC: 13 MMOL/L (ref 3–16)
ANTIBODY SCREEN: NORMAL
APTT: 30.4 SEC (ref 26–36)
AST SERPL-CCNC: 160 U/L (ref 15–37)
BANDED NEUTROPHILS RELATIVE PERCENT: 1 % (ref 0–7)
BASOPHILS ABSOLUTE: 0 K/UL (ref 0–0.2)
BASOPHILS RELATIVE PERCENT: 0 %
BILIRUB SERPL-MCNC: 1.4 MG/DL (ref 0–1)
BILIRUBIN DIRECT: 0.6 MG/DL (ref 0–0.3)
BILIRUBIN, INDIRECT: 0.8 MG/DL (ref 0–1)
BLOOD BANK DISPENSE STATUS: NORMAL
BLOOD BANK PRODUCT CODE: NORMAL
BPU ID: NORMAL
BUN BLDV-MCNC: 25 MG/DL (ref 7–20)
CALCIUM SERPL-MCNC: 8.6 MG/DL (ref 8.3–10.6)
CHLORIDE BLD-SCNC: 100 MMOL/L (ref 99–110)
CO2: 24 MMOL/L (ref 21–32)
CREAT SERPL-MCNC: <0.5 MG/DL (ref 0.9–1.3)
DESCRIPTION BLOOD BANK: NORMAL
EOSINOPHILS ABSOLUTE: 0 K/UL (ref 0–0.6)
EOSINOPHILS RELATIVE PERCENT: 1 %
GFR AFRICAN AMERICAN: >60
GFR NON-AFRICAN AMERICAN: >60
GLUCOSE BLD-MCNC: 142 MG/DL (ref 70–99)
HCT VFR BLD CALC: 20.1 % (ref 40.5–52.5)
HEMOGLOBIN: 7 G/DL (ref 13.5–17.5)
LYMPHOCYTES ABSOLUTE: 0.1 K/UL (ref 1–5.1)
LYMPHOCYTES RELATIVE PERCENT: 8 %
MAGNESIUM: 1.6 MG/DL (ref 1.8–2.4)
MCH RBC QN AUTO: 31.5 PG (ref 26–34)
MCHC RBC AUTO-ENTMCNC: 34.9 G/DL (ref 31–36)
MCV RBC AUTO: 90.2 FL (ref 80–100)
METHOTREXATE LEVEL: 0.09 UMOL/L
MONOCYTES ABSOLUTE: 0 K/UL (ref 0–1.3)
MONOCYTES RELATIVE PERCENT: 2 %
NEUTROPHILS ABSOLUTE: 0.9 K/UL (ref 1.7–7.7)
NEUTROPHILS RELATIVE PERCENT: 88 %
PDW BLD-RTO: 17.8 % (ref 12.4–15.4)
PH UA: 6
PH UA: 6.5
PHOSPHORUS: 3.3 MG/DL (ref 2.5–4.9)
PLATELET # BLD: 59 K/UL (ref 135–450)
PMV BLD AUTO: 6.5 FL (ref 5–10.5)
POTASSIUM SERPL-SCNC: 3.4 MMOL/L (ref 3.5–5.1)
RBC # BLD: 2.23 M/UL (ref 4.2–5.9)
SODIUM BLD-SCNC: 131 MMOL/L (ref 136–145)
SODIUM BLD-SCNC: 132 MMOL/L (ref 136–145)
SODIUM BLD-SCNC: 136 MMOL/L (ref 136–145)
SODIUM BLD-SCNC: 137 MMOL/L (ref 136–145)
THROAT CULTURE: NORMAL
TOTAL PROTEIN: 5.3 G/DL (ref 6.4–8.2)
URIC ACID, SERUM: 2.1 MG/DL (ref 3.5–7.2)
WBC # BLD: 1 K/UL (ref 4–11)

## 2018-11-01 PROCEDURE — 6360000002 HC RX W HCPCS: Performed by: INTERNAL MEDICINE

## 2018-11-01 PROCEDURE — 6370000000 HC RX 637 (ALT 250 FOR IP): Performed by: NURSE PRACTITIONER

## 2018-11-01 PROCEDURE — 6360000002 HC RX W HCPCS: Performed by: NURSE PRACTITIONER

## 2018-11-01 PROCEDURE — 74018 RADEX ABDOMEN 1 VIEW: CPT

## 2018-11-01 PROCEDURE — 86900 BLOOD TYPING SEROLOGIC ABO: CPT

## 2018-11-01 PROCEDURE — 2500000003 HC RX 250 WO HCPCS: Performed by: NURSE PRACTITIONER

## 2018-11-01 PROCEDURE — 2580000003 HC RX 258: Performed by: INTERNAL MEDICINE

## 2018-11-01 PROCEDURE — 36592 COLLECT BLOOD FROM PICC: CPT

## 2018-11-01 PROCEDURE — S0028 INJECTION, FAMOTIDINE, 20 MG: HCPCS | Performed by: NURSE PRACTITIONER

## 2018-11-01 PROCEDURE — 84295 ASSAY OF SERUM SODIUM: CPT

## 2018-11-01 PROCEDURE — 2060000000 HC ICU INTERMEDIATE R&B

## 2018-11-01 PROCEDURE — 85025 COMPLETE CBC W/AUTO DIFF WBC: CPT

## 2018-11-01 PROCEDURE — 83735 ASSAY OF MAGNESIUM: CPT

## 2018-11-01 PROCEDURE — 84550 ASSAY OF BLOOD/URIC ACID: CPT

## 2018-11-01 PROCEDURE — 83986 ASSAY PH BODY FLUID NOS: CPT

## 2018-11-01 PROCEDURE — 80076 HEPATIC FUNCTION PANEL: CPT

## 2018-11-01 PROCEDURE — 2580000003 HC RX 258: Performed by: NURSE PRACTITIONER

## 2018-11-01 PROCEDURE — 84100 ASSAY OF PHOSPHORUS: CPT

## 2018-11-01 PROCEDURE — 80048 BASIC METABOLIC PNL TOTAL CA: CPT

## 2018-11-01 PROCEDURE — 86923 COMPATIBILITY TEST ELECTRIC: CPT

## 2018-11-01 PROCEDURE — P9040 RBC LEUKOREDUCED IRRADIATED: HCPCS

## 2018-11-01 PROCEDURE — 86901 BLOOD TYPING SEROLOGIC RH(D): CPT

## 2018-11-01 PROCEDURE — 86850 RBC ANTIBODY SCREEN: CPT

## 2018-11-01 PROCEDURE — 83520 IMMUNOASSAY QUANT NOS NONAB: CPT

## 2018-11-01 PROCEDURE — 6370000000 HC RX 637 (ALT 250 FOR IP): Performed by: INTERNAL MEDICINE

## 2018-11-01 PROCEDURE — 36430 TRANSFUSION BLD/BLD COMPNT: CPT

## 2018-11-01 PROCEDURE — 2500000003 HC RX 250 WO HCPCS: Performed by: INTERNAL MEDICINE

## 2018-11-01 PROCEDURE — 85730 THROMBOPLASTIN TIME PARTIAL: CPT

## 2018-11-01 RX ORDER — ONDANSETRON 2 MG/ML
8 INJECTION INTRAMUSCULAR; INTRAVENOUS EVERY 8 HOURS
Status: DISCONTINUED | OUTPATIENT
Start: 2018-11-01 | End: 2018-11-08 | Stop reason: HOSPADM

## 2018-11-01 RX ORDER — ACYCLOVIR 200 MG/5ML
800 SUSPENSION ORAL 2 TIMES DAILY
Status: DISCONTINUED | OUTPATIENT
Start: 2018-11-01 | End: 2018-11-05

## 2018-11-01 RX ORDER — TRISODIUM CITRATE DIHYDRATE AND CITRIC ACID MONOHYDRATE 500; 334 MG/5ML; MG/5ML
30 SOLUTION ORAL ONCE
Status: COMPLETED | OUTPATIENT
Start: 2018-11-02 | End: 2018-11-02

## 2018-11-01 RX ORDER — SODIUM CHLORIDE 9 MG/ML
INJECTION, SOLUTION INTRAVENOUS
Status: DISPENSED
Start: 2018-11-01 | End: 2018-11-01

## 2018-11-01 RX ORDER — POTASSIUM CHLORIDE 1.5 G/1.77G
20 POWDER, FOR SOLUTION ORAL DAILY
Status: DISCONTINUED | OUTPATIENT
Start: 2018-11-01 | End: 2018-11-01

## 2018-11-01 RX ORDER — POTASSIUM CHLORIDE 1.5 G/1.77G
20 POWDER, FOR SOLUTION ORAL 2 TIMES DAILY
Status: DISCONTINUED | OUTPATIENT
Start: 2018-11-01 | End: 2018-11-02

## 2018-11-01 RX ORDER — 0.9 % SODIUM CHLORIDE 0.9 %
250 INTRAVENOUS SOLUTION INTRAVENOUS ONCE
Status: DISCONTINUED | OUTPATIENT
Start: 2018-11-01 | End: 2018-11-01

## 2018-11-01 RX ADMIN — SODIUM BICARBONATE: 84 INJECTION, SOLUTION INTRAVENOUS at 21:33

## 2018-11-01 RX ADMIN — ONDANSETRON 8 MG: 2 INJECTION INTRAMUSCULAR; INTRAVENOUS at 15:15

## 2018-11-01 RX ADMIN — POTASSIUM CHLORIDE 20 MEQ: 400 INJECTION, SOLUTION INTRAVENOUS at 05:13

## 2018-11-01 RX ADMIN — VANCOMYCIN HYDROCHLORIDE 1250 MG: 10 INJECTION, POWDER, LYOPHILIZED, FOR SOLUTION INTRAVENOUS at 21:00

## 2018-11-01 RX ADMIN — POTASSIUM CHLORIDE 20 MEQ: 400 INJECTION, SOLUTION INTRAVENOUS at 07:31

## 2018-11-01 RX ADMIN — SODIUM BICARBONATE 50 MEQ: 84 INJECTION, SOLUTION INTRAVENOUS at 00:28

## 2018-11-01 RX ADMIN — MICAFUNGIN SODIUM 50 MG: 10 INJECTION, POWDER, LYOPHILIZED, FOR SOLUTION INTRAVENOUS at 09:38

## 2018-11-01 RX ADMIN — Medication 20 MG: at 09:10

## 2018-11-01 RX ADMIN — POTASSIUM CHLORIDE 20 MEQ: 400 INJECTION, SOLUTION INTRAVENOUS at 08:39

## 2018-11-01 RX ADMIN — MEROPENEM 2 G: 1 INJECTION, POWDER, FOR SOLUTION INTRAVENOUS at 20:21

## 2018-11-01 RX ADMIN — TBO-FILGRASTIM 300 MCG: 300 INJECTION, SOLUTION SUBCUTANEOUS at 17:02

## 2018-11-01 RX ADMIN — Medication 800 MG: at 09:10

## 2018-11-01 RX ADMIN — SODIUM BICARBONATE 50 MEQ: 84 INJECTION, SOLUTION INTRAVENOUS at 05:13

## 2018-11-01 RX ADMIN — ONDANSETRON 8 MG: 2 INJECTION INTRAMUSCULAR; INTRAVENOUS at 09:10

## 2018-11-01 RX ADMIN — POTASSIUM CHLORIDE 20 MEQ: 400 INJECTION, SOLUTION INTRAVENOUS at 06:27

## 2018-11-01 RX ADMIN — DEXAMETHASONE SODIUM PHOSPHATE 4 MG: 4 INJECTION, SOLUTION INTRAMUSCULAR; INTRAVENOUS at 05:38

## 2018-11-01 RX ADMIN — Medication 800 MG: at 22:43

## 2018-11-01 RX ADMIN — DEXAMETHASONE SODIUM PHOSPHATE 4 MG: 4 INJECTION, SOLUTION INTRAMUSCULAR; INTRAVENOUS at 17:02

## 2018-11-01 RX ADMIN — DEXAMETHASONE SODIUM PHOSPHATE 4 MG: 4 INJECTION, SOLUTION INTRAMUSCULAR; INTRAVENOUS at 10:45

## 2018-11-01 RX ADMIN — SODIUM BICARBONATE: 84 INJECTION, SOLUTION INTRAVENOUS at 03:17

## 2018-11-01 RX ADMIN — MEROPENEM 1 G: 1 INJECTION, POWDER, FOR SOLUTION INTRAVENOUS at 03:17

## 2018-11-01 RX ADMIN — MAGNESIUM SULFATE HEPTAHYDRATE 2 G: 40 INJECTION, SOLUTION INTRAVENOUS at 05:09

## 2018-11-01 RX ADMIN — Medication 10 ML: at 09:10

## 2018-11-01 RX ADMIN — POTASSIUM CHLORIDE 20 MEQ: 1.5 POWDER, FOR SOLUTION ORAL at 21:00

## 2018-11-01 RX ADMIN — MEROPENEM 2 G: 1 INJECTION, POWDER, FOR SOLUTION INTRAVENOUS at 11:54

## 2018-11-01 RX ADMIN — Medication 20 MG: at 21:00

## 2018-11-01 RX ADMIN — DEXAMETHASONE SODIUM PHOSPHATE 2 DROP: 1 SOLUTION/ DROPS OPHTHALMIC at 05:39

## 2018-11-01 RX ADMIN — Medication 10 ML: at 21:07

## 2018-11-01 RX ADMIN — VANCOMYCIN HYDROCHLORIDE 1250 MG: 10 INJECTION, POWDER, LYOPHILIZED, FOR SOLUTION INTRAVENOUS at 12:26

## 2018-11-01 RX ADMIN — POTASSIUM CHLORIDE 20 MEQ: 1.5 POWDER, FOR SOLUTION ORAL at 09:10

## 2018-11-01 ASSESSMENT — PAIN SCALES - PAIN ASSESSMENT IN ADVANCED DEMENTIA (PAINAD)
BREATHING: 0
BODYLANGUAGE: 0
TOTALSCORE: 0
FACIALEXPRESSION: 0
CONSOLABILITY: 0
BREATHING: 0
NEGVOCALIZATION: 0
NEGVOCALIZATION: 0
TOTALSCORE: 0
FACIALEXPRESSION: 0
BODYLANGUAGE: 0
TOTALSCORE: 0
NEGVOCALIZATION: 0
BREATHING: 0
FACIALEXPRESSION: 0
CONSOLABILITY: 0
TOTALSCORE: 0
FACIALEXPRESSION: 0
NEGVOCALIZATION: 0
TOTALSCORE: 0
BREATHING: 0
BREATHING: 0
FACIALEXPRESSION: 0
CONSOLABILITY: 0
NEGVOCALIZATION: 0
NEGVOCALIZATION: 0
BREATHING: 0
TOTALSCORE: 0
NEGVOCALIZATION: 0
BODYLANGUAGE: 0
TOTALSCORE: 0
FACIALEXPRESSION: 0
FACIALEXPRESSION: 0
BODYLANGUAGE: 0
BREATHING: 0
CONSOLABILITY: 0
CONSOLABILITY: 0
BREATHING: 0
NEGVOCALIZATION: 0
TOTALSCORE: 0
FACIALEXPRESSION: 0
BREATHING: 0
CONSOLABILITY: 0
TOTALSCORE: 0
BODYLANGUAGE: 0
BODYLANGUAGE: 0
CONSOLABILITY: 0
NEGVOCALIZATION: 0
CONSOLABILITY: 0
BODYLANGUAGE: 0
FACIALEXPRESSION: 0
CONSOLABILITY: 0

## 2018-11-01 ASSESSMENT — PAIN SCALES - GENERAL
PAINLEVEL_OUTOF10: 0

## 2018-11-01 NOTE — PROGRESS NOTES
[facility] Progress Note    2018     Leigh Chavez    MRN: [id number]    : 1960    Referring MD: Maggy Harvey  [facility] [place]    SUBJECTIVE:     ECOG PS:(4) Completely disabled, unable to carry out self-care and confined to bed or chair    Isolation: Airborne    Medications    Scheduled Meds:   sodium chloride  250 mL Intravenous Once    scopolamine  1 patch Transdermal Q72H    Tbo-Filgrastim  300 mcg Subcutaneous QPM    meropenem  1 g Intravenous Q8H    dexamethasone  4 mg Intravenous Q6H    ondansetron  4 mg Intravenous Daily    leucovorin calcium  20 mg Intravenous Q6H    famotidine (PEPCID) injection  20 mg Intravenous BID    dexamethasone  2 drop Both Eyes 4 times per day    enoxaparin  40 mg Subcutaneous Nightly    sodium chloride flush  10 mL Intravenous 2 times per day    Saline Mouthwash  15 mL Swish & Spit 4x Daily AC & HS     Continuous Infusions:   sodium bicarbonate infusion 75 mL/hr at 18 0317    sodium chloride Stopped (10/28/18 0045)     PRN Meds:.potassium chloride, magnesium sulfate, prochlorperazine **OR** prochlorperazine, sodium bicarbonate, leucovorin calcium, acetaminophen, ondansetron **OR** ondansetron, sodium chloride, sodium chloride flush, potassium chloride, magnesium sulfate, magnesium hydroxide, Saline Mouthwash, alteplase    ROS:  Unable to assess d/t AMS changes    Physical Exam:     I&O:      Intake/Output Summary (Last 24 hours) at 18 0733  Last data filed at 18 0537   Gross per 24 hour   Intake             2252 ml   Output             2600 ml   Net             -348 ml       Vital Signs:  /77   Pulse 95   Temp 99.2 °F (37.3 °C) (Axillary)   Resp 28   Ht 6' 1\" (1.854 m)   Wt 174 lb 6.1 oz (79.1 kg)   SpO2 96%   BMI 23.01 kg/m²     Weight:    Wt Readings from Last 3 Encounters:   10/30/18 174 lb 6.1 oz (79.1 kg)   18 188 lb 4.8 oz (85.4 kg)   18 174 lb 1.6 oz (79 kg) uop w/ stable renal fxn stable and weight   - Renal following (Dr. Idris Hedrick), appreciate recs; Dr Matt Quintanilla covering for Dr. 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   - Replace Potassium and Magnesium per protocol. 5. GI:  Vomiting overnight and diarrhea d/t TF   - Cont IV Pepcid 20 mg bid  - Increase IV Zofran 8 mg q8hrs scheduled  - Cont Scope patch (started 10/31/18)     6. Moderate malnutrition:  Altered MS and unable to swallow  - Dietary to follow   - NPO d/t AMS changes  - TF held w/ N/V and possible aspiration. Will resume TF:  Standard w/ fiber, @ goal 75 cc/hr    7. 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 Concern for PRES or  osmotic demyelination  - LP (10/24/18): pathology consistent w/ lymphoma   - Meningitis Encephalitis Panel: Negative  - CSF studies - Pending,  viral, fungal, atypical bacteria (TB, listeria, whipples, etc), paraneoplastic panel, MARIELA virus   - S/p thiamine replacement (stopped 10/29/18)     8. Weakness:  Ongoing related to disease and encephalopathy   - Unable to participate in PT/OT d/t AMS    9. Elevated LFTs:  Likely chemotherapy induced, transaminases improving, but T.bili up slightly   - Cont to follow daily     - DVT Prophylaxis: Platelets <72,679 cells/dL - prophylactic lovenox on hold and mechanical prophylaxis with bilateral SCDs while in bed in place. Contraindications to pharmacologic prophylaxis: Thrombocytopenia  Contraindications to mechanical prophylaxis: None    - Disposition:  Unknown    Discussed with wife and daughter the overall poor prognosis associated with this condition.      KYLIE Eaton - CNP

## 2018-11-01 NOTE — PROGRESS NOTES
Nephrology Progress Note        Sodium better 136   Off 2 percent saline   Off TF as he vomited     Past Medical History:   Diagnosis Date    Anxiety     Cancer ([facility] [address] 06/2018    Peripheral T - Cell Lymphoma, Stage IVb    GERD (gastroesophageal reflux disease)     Hiatal hernia     History of blood transfusion     HTN (hypertension)        Past Surgical History:   Procedure Laterality Date    APPENDECTOMY      INGUINAL HERNIA REPAIR Right     UPPER GASTROINTESTINAL ENDOSCOPY N/A 8/29/2018    EGD BIOPSY performed by Mando Moore MD at [facility] ENDOSCOPY       Family History   Problem Relation Age of Onset    Dementia Father          Current Medications:      potassium chloride (KLOR-CON) packet 20 mEq Daily   micafungin (MYCAMINE) 50 mg in sodium chloride 0.9 % 100 mL IVPB Daily   acyclovir (ZOVIRAX) suspension 800 mg BID   ondansetron (ZOFRAN) injection 8 mg Q8H   sodium chloride 0.9 % infusion    sodium bicarbonate 150 mEq in sodium chloride 0.45 % 1,000 mL infusion Continuous   scopolamine (TRANSDERM-SCOP) transdermal patch 1 patch Q72H   Tbo-Filgrastim (GRANIX) injection 300 mcg QPM   meropenem (MERREM) 1 g in sodium chloride 0.9 % 100 mL IVPB (mini-bag) Q8H   dexamethasone (DECADRON) injection 4 mg Q6H   potassium chloride 20 mEq/50 mL IVPB (Central Line) PRN   magnesium sulfate 2 g in 50 mL IVPB premix PRN   famotidine (PEPCID) injection 20 mg BID   prochlorperazine (COMPAZINE) tablet 10 mg Q4H PRN   Or    prochlorperazine (COMPAZINE) injection 10 mg Q4H PRN   sodium bicarbonate 8.4 % injection 50 mEq PRN   acetaminophen (TYLENOL) tablet 650 mg Q4H PRN   ondansetron (ZOFRAN) injection 8 mg Q8H PRN   Or    ondansetron (ZOFRAN) tablet 8 mg Q8H PRN   0.9 % sodium chloride infusion Continuous PRN   sodium chloride flush 0.9 % injection 10 mL 2 times per day   sodium chloride flush 0.9 % injection 10 mL PRN   potassium the left thalamus, and there is also some subtle enhancement, ill-defined, involving the left thalamus, left midbrain and left cerebral peduncle region. Appearance is nonspecific, although the 2 main differential diagnoses would include posterior    reversible encephalopathy syndrome (DE ES), which can be associated with chemotherapeutic agents, with additional differential diagnostic consideration that of osmotic demyelination. Correlate with any pertinent electrolyte imbalances. Results are telephoned to the nurse caring for the patient, Erika Barragan, at 4:08 pm on 10/23/2018. XR CHEST STANDARD (2 VW)   Final Result      Streaky bilateral opacities likely relates to atelectasis and/or pneumonitis. CT ABDOMEN PELVIS W IV CONTRAST Additional Contrast? None   Final Result      No acute intra-abdominal or pelvic pathology. Multiple enlarged mesenteric lymph nodes and an enlarged spleen similar to prior examination. Small to moderate hiatal hernia      High density material in the gallbladder likely sludge and/or small stones. Areas of consolidation in the lower lungs and right middle lobe which are new when compared to prior exam                Assessment/Plan   1. 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 ABn MRI finding - PRES vs OD     3. Refractory PTCL, Stage IVb w/ BM involvement:     4. Chemo - DHAP  S/p 2 cycles - with cisplatin     5.  Hyponatremia     Plan   - Ur studies show SIADH sec to CNS involvement   - Off TF as he vomited   - No need for free water with TF   - off 2 percent saline   - Inc bicarb gtt - goal Ur Ph > 8   - check sodium every 6 hrs  - on bicarb gtt until mtx levels < 0.05   - Monitor renal function   - Monitor mtx levels   - replace K and Mag as needed - loss is sec to cisplatin use     - Salt wasting sec to Cisplatin is also possible but less likely             Thank you for allowing us to participate in care of Jay Edward

## 2018-11-02 LAB
ALBUMIN SERPL-MCNC: 3.1 G/DL (ref 3.4–5)
ALP BLD-CCNC: 28 U/L (ref 40–129)
ALT SERPL-CCNC: 238 U/L (ref 10–40)
ANION GAP SERPL CALCULATED.3IONS-SCNC: 9 MMOL/L (ref 3–16)
AST SERPL-CCNC: 48 U/L (ref 15–37)
BASOPHILS ABSOLUTE: 0 K/UL (ref 0–0.2)
BASOPHILS RELATIVE PERCENT: 0.8 %
BILIRUB SERPL-MCNC: 0.9 MG/DL (ref 0–1)
BILIRUBIN DIRECT: <0.2 MG/DL (ref 0–0.3)
BILIRUBIN, INDIRECT: ABNORMAL MG/DL (ref 0–1)
BLOOD BANK DISPENSE STATUS: NORMAL
BLOOD BANK PRODUCT CODE: NORMAL
BPU ID: NORMAL
BUN BLDV-MCNC: 19 MG/DL (ref 7–20)
CALCIUM SERPL-MCNC: 8.8 MG/DL (ref 8.3–10.6)
CHLORIDE BLD-SCNC: 104 MMOL/L (ref 99–110)
CO2: 24 MMOL/L (ref 21–32)
CREAT SERPL-MCNC: <0.5 MG/DL (ref 0.9–1.3)
DESCRIPTION BLOOD BANK: NORMAL
EOSINOPHILS ABSOLUTE: 0 K/UL (ref 0–0.6)
EOSINOPHILS RELATIVE PERCENT: 0.6 %
GFR AFRICAN AMERICAN: >60
GFR NON-AFRICAN AMERICAN: >60
GLUCOSE BLD-MCNC: 109 MG/DL (ref 70–99)
HCT VFR BLD CALC: 18.8 % (ref 40.5–52.5)
HEMOGLOBIN: 6.7 G/DL (ref 13.5–17.5)
INR BLD: 1.48 (ref 0.86–1.14)
LACTATE DEHYDROGENASE: 294 U/L (ref 100–190)
LYMPHOCYTES ABSOLUTE: 0.1 K/UL (ref 1–5.1)
LYMPHOCYTES RELATIVE PERCENT: 16 %
MAGNESIUM: 1.8 MG/DL (ref 1.8–2.4)
MCH RBC QN AUTO: 31.6 PG (ref 26–34)
MCHC RBC AUTO-ENTMCNC: 35.7 G/DL (ref 31–36)
MCV RBC AUTO: 88.5 FL (ref 80–100)
MISCELLANEOUS LAB TEST ORDER: NORMAL
MONOCYTES ABSOLUTE: 0 K/UL (ref 0–1.3)
MONOCYTES RELATIVE PERCENT: 0.6 %
NEUTROPHILS ABSOLUTE: 0.5 K/UL (ref 1.7–7.7)
NEUTROPHILS RELATIVE PERCENT: 82 %
PDW BLD-RTO: 16.7 % (ref 12.4–15.4)
PHOSPHORUS: 2 MG/DL (ref 2.5–4.9)
PLATELET # BLD: 38 K/UL (ref 135–450)
PMV BLD AUTO: 6.8 FL (ref 5–10.5)
POTASSIUM SERPL-SCNC: 4.6 MMOL/L (ref 3.5–5.1)
PROTHROMBIN TIME: 16.9 SEC (ref 9.8–13)
RBC # BLD: 2.13 M/UL (ref 4.2–5.9)
SODIUM BLD-SCNC: 133 MMOL/L (ref 136–145)
SODIUM BLD-SCNC: 136 MMOL/L (ref 136–145)
SODIUM BLD-SCNC: 137 MMOL/L (ref 136–145)
TOTAL PROTEIN: 4.9 G/DL (ref 6.4–8.2)
URIC ACID, SERUM: 1.6 MG/DL (ref 3.5–7.2)
WBC # BLD: 0.6 K/UL (ref 4–11)

## 2018-11-02 PROCEDURE — 2580000003 HC RX 258: Performed by: INTERNAL MEDICINE

## 2018-11-02 PROCEDURE — 6370000000 HC RX 637 (ALT 250 FOR IP): Performed by: INTERNAL MEDICINE

## 2018-11-02 PROCEDURE — 2580000003 HC RX 258: Performed by: NURSE PRACTITIONER

## 2018-11-02 PROCEDURE — 2500000003 HC RX 250 WO HCPCS: Performed by: NURSE PRACTITIONER

## 2018-11-02 PROCEDURE — 80076 HEPATIC FUNCTION PANEL: CPT

## 2018-11-02 PROCEDURE — 85610 PROTHROMBIN TIME: CPT

## 2018-11-02 PROCEDURE — 2500000003 HC RX 250 WO HCPCS: Performed by: INTERNAL MEDICINE

## 2018-11-02 PROCEDURE — 84550 ASSAY OF BLOOD/URIC ACID: CPT

## 2018-11-02 PROCEDURE — 6360000002 HC RX W HCPCS: Performed by: INTERNAL MEDICINE

## 2018-11-02 PROCEDURE — 86923 COMPATIBILITY TEST ELECTRIC: CPT

## 2018-11-02 PROCEDURE — 36592 COLLECT BLOOD FROM PICC: CPT

## 2018-11-02 PROCEDURE — P9040 RBC LEUKOREDUCED IRRADIATED: HCPCS

## 2018-11-02 PROCEDURE — 2060000000 HC ICU INTERMEDIATE R&B

## 2018-11-02 PROCEDURE — 83735 ASSAY OF MAGNESIUM: CPT

## 2018-11-02 PROCEDURE — 6370000000 HC RX 637 (ALT 250 FOR IP): Performed by: NURSE PRACTITIONER

## 2018-11-02 PROCEDURE — 83615 LACTATE (LD) (LDH) ENZYME: CPT

## 2018-11-02 PROCEDURE — 80048 BASIC METABOLIC PNL TOTAL CA: CPT

## 2018-11-02 PROCEDURE — 6360000002 HC RX W HCPCS: Performed by: NURSE PRACTITIONER

## 2018-11-02 PROCEDURE — S0028 INJECTION, FAMOTIDINE, 20 MG: HCPCS | Performed by: NURSE PRACTITIONER

## 2018-11-02 PROCEDURE — 84295 ASSAY OF SERUM SODIUM: CPT

## 2018-11-02 PROCEDURE — 84100 ASSAY OF PHOSPHORUS: CPT

## 2018-11-02 PROCEDURE — 85025 COMPLETE CBC W/AUTO DIFF WBC: CPT

## 2018-11-02 PROCEDURE — 36591 DRAW BLOOD OFF VENOUS DEVICE: CPT

## 2018-11-02 RX ORDER — POTASSIUM CHLORIDE 1.5 G/1.77G
20 POWDER, FOR SOLUTION ORAL DAILY
Status: DISCONTINUED | OUTPATIENT
Start: 2018-11-02 | End: 2018-11-05

## 2018-11-02 RX ORDER — 0.9 % SODIUM CHLORIDE 0.9 %
250 INTRAVENOUS SOLUTION INTRAVENOUS ONCE
Status: COMPLETED | OUTPATIENT
Start: 2018-11-02 | End: 2018-11-02

## 2018-11-02 RX ADMIN — ONDANSETRON 8 MG: 2 INJECTION INTRAMUSCULAR; INTRAVENOUS at 17:25

## 2018-11-02 RX ADMIN — DEXAMETHASONE SODIUM PHOSPHATE 4 MG: 4 INJECTION, SOLUTION INTRAMUSCULAR; INTRAVENOUS at 12:55

## 2018-11-02 RX ADMIN — Medication 800 MG: at 22:17

## 2018-11-02 RX ADMIN — POTASSIUM PHOSPHATE, MONOBASIC AND POTASSIUM PHOSPHATE, DIBASIC 20 MMOL: 224; 236 INJECTION, SOLUTION INTRAVENOUS at 17:18

## 2018-11-02 RX ADMIN — MICAFUNGIN SODIUM 50 MG: 10 INJECTION, POWDER, LYOPHILIZED, FOR SOLUTION INTRAVENOUS at 10:58

## 2018-11-02 RX ADMIN — Medication 800 MG: at 08:11

## 2018-11-02 RX ADMIN — DEXAMETHASONE SODIUM PHOSPHATE 4 MG: 4 INJECTION, SOLUTION INTRAMUSCULAR; INTRAVENOUS at 17:14

## 2018-11-02 RX ADMIN — TBO-FILGRASTIM 300 MCG: 300 INJECTION, SOLUTION SUBCUTANEOUS at 17:14

## 2018-11-02 RX ADMIN — MEROPENEM 2 G: 1 INJECTION, POWDER, FOR SOLUTION INTRAVENOUS at 20:50

## 2018-11-02 RX ADMIN — VANCOMYCIN HYDROCHLORIDE 1250 MG: 10 INJECTION, POWDER, LYOPHILIZED, FOR SOLUTION INTRAVENOUS at 22:16

## 2018-11-02 RX ADMIN — DEXAMETHASONE SODIUM PHOSPHATE 4 MG: 4 INJECTION, SOLUTION INTRAMUSCULAR; INTRAVENOUS at 05:03

## 2018-11-02 RX ADMIN — MEROPENEM 2 G: 1 INJECTION, POWDER, FOR SOLUTION INTRAVENOUS at 14:21

## 2018-11-02 RX ADMIN — POTASSIUM CHLORIDE 20 MEQ: 1.5 POWDER, FOR SOLUTION ORAL at 08:11

## 2018-11-02 RX ADMIN — Medication 10 ML: at 08:12

## 2018-11-02 RX ADMIN — MAGNESIUM SULFATE HEPTAHYDRATE 2 G: 40 INJECTION, SOLUTION INTRAVENOUS at 05:47

## 2018-11-02 RX ADMIN — VANCOMYCIN HYDROCHLORIDE 1250 MG: 10 INJECTION, POWDER, LYOPHILIZED, FOR SOLUTION INTRAVENOUS at 05:03

## 2018-11-02 RX ADMIN — ONDANSETRON 8 MG: 2 INJECTION INTRAMUSCULAR; INTRAVENOUS at 08:11

## 2018-11-02 RX ADMIN — MEROPENEM 2 G: 1 INJECTION, POWDER, FOR SOLUTION INTRAVENOUS at 04:23

## 2018-11-02 RX ADMIN — SODIUM CITRATE AND CITRIC ACID MONOHYDRATE 30 ML: 500; 334 SOLUTION ORAL at 00:06

## 2018-11-02 RX ADMIN — VANCOMYCIN HYDROCHLORIDE 1250 MG: 10 INJECTION, POWDER, LYOPHILIZED, FOR SOLUTION INTRAVENOUS at 12:56

## 2018-11-02 RX ADMIN — SODIUM CHLORIDE 250 ML: 9 INJECTION, SOLUTION INTRAVENOUS at 07:02

## 2018-11-02 RX ADMIN — Medication 20 MG: at 20:53

## 2018-11-02 RX ADMIN — DEXAMETHASONE SODIUM PHOSPHATE 4 MG: 4 INJECTION, SOLUTION INTRAMUSCULAR; INTRAVENOUS at 00:03

## 2018-11-02 RX ADMIN — ONDANSETRON 8 MG: 2 INJECTION INTRAMUSCULAR; INTRAVENOUS at 00:04

## 2018-11-02 RX ADMIN — Medication 20 MG: at 08:11

## 2018-11-02 RX ADMIN — SODIUM BICARBONATE: 84 INJECTION, SOLUTION INTRAVENOUS at 06:46

## 2018-11-02 ASSESSMENT — PAIN SCALES - PAIN ASSESSMENT IN ADVANCED DEMENTIA (PAINAD)
BREATHING: 0
NEGVOCALIZATION: 0
FACIALEXPRESSION: 0
CONSOLABILITY: 0
TOTALSCORE: 0
BODYLANGUAGE: 0
CONSOLABILITY: 0
FACIALEXPRESSION: 0
BODYLANGUAGE: 0
TOTALSCORE: 0
BODYLANGUAGE: 0
BREATHING: 0
FACIALEXPRESSION: 0
CONSOLABILITY: 0
FACIALEXPRESSION: 0
BREATHING: 0
BODYLANGUAGE: 0
FACIALEXPRESSION: 0
CONSOLABILITY: 0
TOTALSCORE: 0
TOTALSCORE: 0
NEGVOCALIZATION: 0
NEGVOCALIZATION: 0
CONSOLABILITY: 0
NEGVOCALIZATION: 0
BODYLANGUAGE: 0
CONSOLABILITY: 0
BODYLANGUAGE: 0
NEGVOCALIZATION: 0
BREATHING: 0
TOTALSCORE: 0
TOTALSCORE: 0
FACIALEXPRESSION: 0
NEGVOCALIZATION: 0

## 2018-11-02 ASSESSMENT — PAIN SCALES - GENERAL
PAINLEVEL_OUTOF10: 0

## 2018-11-02 NOTE — PROGRESS NOTES
[address] [facility]    2018     Flaca Chow    MRN: [id number]    : 1960    Referring MD: Riana Moon  [facility], [place]    SUBJECTIVE: He was able to wake up and follow very simple commands.      ECOG PS:(4) Completely disabled, unable to carry out self-care and confined to bed or chair    Isolation: none     Medications    Scheduled Meds:   sodium chloride  250 mL Intravenous Once    potassium phosphate IVPB  20 mmol Intravenous Once    potassium chloride  20 mEq Oral Daily    micafungin (MYCAMINE) IVPB  50 mg Intravenous Daily    acyclovir  800 mg Oral BID    ondansetron  8 mg Intravenous Q8H    meropenem  2 g Intravenous Q8H    vancomycin (VANCOCIN) central line IVPB  1,250 mg Intravenous Q8H    scopolamine  1 patch Transdermal Q72H    Tbo-Filgrastim  300 mcg Subcutaneous QPM    dexamethasone  4 mg Intravenous Q6H    famotidine (PEPCID) injection  20 mg Intravenous BID    sodium chloride flush  10 mL Intravenous 2 times per day    Saline Mouthwash  15 mL Swish & Spit 4x Daily AC & HS     Continuous Infusions:   sodium bicarbonate infusion 100 mL/hr at 18 0646    sodium chloride Stopped (10/28/18 0045)     PRN Meds:.potassium chloride, magnesium sulfate, prochlorperazine **OR** prochlorperazine, acetaminophen, sodium chloride, sodium chloride flush, potassium chloride, magnesium sulfate, magnesium hydroxide, Saline Mouthwash, alteplase    ROS:  Unable to assess d/t AMS changes    Physical Exam:     I&O:      Intake/Output Summary (Last 24 hours) at 18 0742  Last data filed at 18 0710   Gross per 24 hour   Intake             4754 ml   Output             3100 ml   Net             1654 ml       Vital Signs:  /88   Pulse 58   Temp 97.8 °F (36.6 °C) (Axillary)   Resp 19   Ht 6' 1\" (1.854 m)   Wt 169 lb 1.5 oz (76.7 kg)   SpO2 96%   BMI 22.31 kg/m²     Weight:    Wt Readings from Last 3 Encounters:   18 Chemotherapy induced pancytopenia   - Transfuse for Hgb < 7 and Platelets < 10 K.  - PRBC transfusion today   - Cont Granix    4. Metabolic:  hypoNa improving w/ hypoPhos. Good uop w/ stable renal fxn stable and weight   - Renal following (Dr. Romayne Fat), appreciate recs; Dr Mullins Bars covering for Dr. 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   - Replace Potassium and Magnesium per protocol. 5. GI:  Vomiting overnight and diarrhea d/t TF   - Cont IV Pepcid 20 mg bid  - Increase IV Zofran 8 mg q8hrs scheduled  - Cont Scope patch (started 10/31/18)     6. Moderate malnutrition:  Altered MS and unable to swallow  - Dietary to follow   - NPO d/t AMS changes  - Cont TF, increase to aprox 1/2 goal (75ml/hr) and will increase over the weekend if he tolerates this well     7. 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 Concern for PRES or  osmotic demyelination  - LP (10/24/18): pathology consistent w/ lymphoma   - Meningitis Encephalitis Panel: Negative  - CSF studies - Pending,  viral, fungal, atypical bacteria (TB, listeria, whipples, etc), paraneoplastic panel, MARIELA virus   - S/p thiamine replacement (stopped 10/29/18)     8. Weakness:  Ongoing related to disease and encephalopathy   - Unable to participate in PT/OT d/t AMS    9. Elevated LFTs:  Likely chemotherapy induced, but improving   - Cont to follow daily     - DVT Prophylaxis: Platelets <01,772 cells/dL - prophylactic lovenox on hold and mechanical prophylaxis with bilateral SCDs while in bed in place.   Contraindications to pharmacologic prophylaxis: Thrombocytopenia  Contraindications to mechanical prophylaxis: None    - Disposition:  Unknown, discussed with wife and

## 2018-11-02 NOTE — PROGRESS NOTES
Nutrition Assessment    Type and Reason for Visit: Reassess    Nutrition Recommendations:   · Continue NPO and monitor ability to advance diet  · Monitor weight, labs and clinical progress    Enteral Nutrition Recommendations:   1. Continue Jevity 1.2 (Standard w/ Fiber formula) via NG and as tolerated, increase by 20 mL/hr q 4 hours until goal of 75 mL/hr is met to provide 1800ml TV; 2160 kcal; 100 g protein; 1452ml free water with no water bolus  2. Monitor closely and correct lytes (K, Phos, Mg) before progressing TF to goal d/t risk of refeeding syndrome (hx extended inadequate nutritional intake). 3. Ensure head of bed is 30-45 degrees during feeding  4. Monitor for tolerance (bowel habits, N/V, abdominal distention)    Malnutrition Assessment:  · Malnutrition Status: Meets the criteria for moderate malnutrition  · Context: Chronic illness  · Findings of the 6 clinical characteristics of malnutrition (Minimum of 2 out of 6 clinical characteristics is required to make the diagnosis of moderate or severe Protein Calorie Malnutrition based on AND/ASPEN Guidelines):  1. Energy Intake-Less than or equal to 50%, greater than or equal to 5 days    2. Weight Loss-10% loss or greater, in 6 months  3. Fat Loss-No significant subcutaneous fat loss,    4. Muscle Loss-Moderate muscle mass loss, Temples (temporalis muscle), Clavicles (pectoralis and deltoids)  5. Fluid Accumulation-No significant fluid accumulation,    6.  Strength-Not measured    Nutrition Diagnosis:   · Problem: Inadequate oral intake  · Etiology: related to Catabolic illness     Signs and symptoms:  as evidenced by Weight loss greater than or equal to 10% in 6 months    Nutrition Assessment:  · Subjective Assessment: Follow-up for TF tolerance. Pt sleeping, wife present. Pt was more responsive yesterday (thirsty and asking for water) per spouse. Pt is currently NPO. Pt d/c from SLP. TF held on 10/31 d/t n/v and possible aspiration noted. Trophic TF restarted yesterday. TF is currently running at 10 ml/hr with no water bolus. Plan to increase TF to 20 ml/hr today then 40 ml/hr as tolerated per team. Spouse denies further vomiting. Spouse reports nausea is controlled with meds. Pt with watery/loose BMs per I/O. +~7.5 L since adm noted.   · Nutrition-Focused Physical Findings: non-pitting RLE (footy only) edema; stool 2 x recorded from 10/31-11/1; emesis 1 x recorded on 10/31; AMS; weakness  · Wound Type: None  · Current Nutrition Therapies:  · Oral Diet Orders: NPO   · Oral Diet intake: NPO  · Oral Nutrition Supplement (ONS) Orders: None  · ONS intake: NPO  · Tube Feeding (TF) Orders:   · Feeding Route: Nasogastric  · Formula: Standard w/Fiber  · Rate (ml/hr):10 ml/hr    · Volume (ml/day): 240 ml  · Duration: Continuous 24hrs  · TF Residuals: Not applicable  · Water Flushes:  (None)  · Current TF & Flush Orders Provides: Jevity 1.2 at 10 ml/hr provides 288 kcal, 13 grams of protein and 194 ml free water  · Goal TF & Flush Orders Provides: RD Goal: Jevity 1.2 @ 75ml/hr x 24 hrs to provide 1800 TV; 2160 kcal; 100g protein; 1452 ml free water   · Anthropometric Measures:  · Ht: 6' 1\" (185.4 cm)   · Current Body Wt: 170 lb (77.1 kg)  · Admission Body Wt: 175 lb 11.3 oz (79.7 kg)  · Usual Body Wt: 230 lb (104.3 kg)  · % Weight Change: 18%,  x 4 months   · Ideal Body Wt: 184 lb (83.5 kg),   · BMI Classification: BMI 18.5 - 24.9 Normal Weight  · Comparative Standards (Estimated Nutrition Needs):  · Estimated Daily Total Kcal: 6723-5861  · Estimated Daily Protein (g):   · Estimated Daily Fluid (ml/day): 2028-8420    Estimated Intake vs Estimated Needs: Intake Improving (with EN)    Nutrition Risk Level: High    Nutrition Interventions:   Continue NPO, Continue current Tube Feeding  Continued Inpatient Monitoring    Nutrition Evaluation:   · Evaluation: Goals set   · Goals: Pt will tolerate EN at goal to meet 100% needs until po diet can advance

## 2018-11-03 LAB
ALBUMIN SERPL-MCNC: 3.3 G/DL (ref 3.4–5)
ALP BLD-CCNC: 31 U/L (ref 40–129)
ALT SERPL-CCNC: 192 U/L (ref 10–40)
ANION GAP SERPL CALCULATED.3IONS-SCNC: 14 MMOL/L (ref 3–16)
ANISOCYTOSIS: ABNORMAL
AST SERPL-CCNC: 26 U/L (ref 15–37)
BANDED NEUTROPHILS RELATIVE PERCENT: 3 % (ref 0–7)
BASOPHILS ABSOLUTE: 0 K/UL (ref 0–0.2)
BASOPHILS RELATIVE PERCENT: 0 %
BILIRUB SERPL-MCNC: 1 MG/DL (ref 0–1)
BILIRUBIN DIRECT: <0.2 MG/DL (ref 0–0.3)
BILIRUBIN, INDIRECT: ABNORMAL MG/DL (ref 0–1)
BUN BLDV-MCNC: 21 MG/DL (ref 7–20)
CALCIUM SERPL-MCNC: 9.2 MG/DL (ref 8.3–10.6)
CHLORIDE BLD-SCNC: 104 MMOL/L (ref 99–110)
CO2: 20 MMOL/L (ref 21–32)
CREAT SERPL-MCNC: <0.5 MG/DL (ref 0.9–1.3)
DOHLE BODIES: PRESENT
EOSINOPHILS ABSOLUTE: 0 K/UL (ref 0–0.6)
EOSINOPHILS RELATIVE PERCENT: 1 %
GFR AFRICAN AMERICAN: >60
GFR NON-AFRICAN AMERICAN: >60
GLUCOSE BLD-MCNC: 132 MG/DL (ref 70–99)
HCT VFR BLD CALC: 25.3 % (ref 40.5–52.5)
HEMOGLOBIN: 9 G/DL (ref 13.5–17.5)
LYMPHOCYTES ABSOLUTE: 0.2 K/UL (ref 1–5.1)
LYMPHOCYTES RELATIVE PERCENT: 25 %
MACROCYTES: ABNORMAL
MAGNESIUM: 1.9 MG/DL (ref 1.8–2.4)
MCH RBC QN AUTO: 31.2 PG (ref 26–34)
MCHC RBC AUTO-ENTMCNC: 35.7 G/DL (ref 31–36)
MCV RBC AUTO: 87.5 FL (ref 80–100)
METAMYELOCYTES RELATIVE PERCENT: 3 %
MICROCYTES: ABNORMAL
MONOCYTES ABSOLUTE: 0 K/UL (ref 0–1.3)
MONOCYTES RELATIVE PERCENT: 7 %
MYELOCYTE PERCENT: 4 %
NEUTROPHILS ABSOLUTE: 0.4 K/UL (ref 1.7–7.7)
NEUTROPHILS RELATIVE PERCENT: 57 %
PDW BLD-RTO: 16.4 % (ref 12.4–15.4)
PHOSPHORUS: 2.6 MG/DL (ref 2.5–4.9)
PLATELET # BLD: 36 K/UL (ref 135–450)
PLATELET SLIDE REVIEW: ABNORMAL
PMV BLD AUTO: 6.5 FL (ref 5–10.5)
POIKILOCYTES: ABNORMAL
POTASSIUM SERPL-SCNC: 4.5 MMOL/L (ref 3.5–5.1)
RBC # BLD: 2.9 M/UL (ref 4.2–5.9)
SLIDE REVIEW: ABNORMAL
SODIUM BLD-SCNC: 132 MMOL/L (ref 136–145)
SODIUM BLD-SCNC: 134 MMOL/L (ref 136–145)
SODIUM BLD-SCNC: 136 MMOL/L (ref 136–145)
SODIUM BLD-SCNC: 138 MMOL/L (ref 136–145)
SODIUM BLD-SCNC: 138 MMOL/L (ref 136–145)
TOTAL PROTEIN: 5.5 G/DL (ref 6.4–8.2)
TOXIC GRANULATION: PRESENT
URIC ACID, SERUM: 1.7 MG/DL (ref 3.5–7.2)
VANCOMYCIN TROUGH: 21.9 UG/ML (ref 10–20)
WBC # BLD: 0.6 K/UL (ref 4–11)

## 2018-11-03 PROCEDURE — 6360000002 HC RX W HCPCS: Performed by: INTERNAL MEDICINE

## 2018-11-03 PROCEDURE — 2580000003 HC RX 258: Performed by: INTERNAL MEDICINE

## 2018-11-03 PROCEDURE — 85025 COMPLETE CBC W/AUTO DIFF WBC: CPT

## 2018-11-03 PROCEDURE — 6370000000 HC RX 637 (ALT 250 FOR IP): Performed by: INTERNAL MEDICINE

## 2018-11-03 PROCEDURE — 83735 ASSAY OF MAGNESIUM: CPT

## 2018-11-03 PROCEDURE — 6370000000 HC RX 637 (ALT 250 FOR IP): Performed by: NURSE PRACTITIONER

## 2018-11-03 PROCEDURE — 80048 BASIC METABOLIC PNL TOTAL CA: CPT

## 2018-11-03 PROCEDURE — 2060000000 HC ICU INTERMEDIATE R&B

## 2018-11-03 PROCEDURE — 2580000003 HC RX 258: Performed by: NURSE PRACTITIONER

## 2018-11-03 PROCEDURE — 84550 ASSAY OF BLOOD/URIC ACID: CPT

## 2018-11-03 PROCEDURE — 84100 ASSAY OF PHOSPHORUS: CPT

## 2018-11-03 PROCEDURE — 2500000003 HC RX 250 WO HCPCS: Performed by: NURSE PRACTITIONER

## 2018-11-03 PROCEDURE — 80202 ASSAY OF VANCOMYCIN: CPT

## 2018-11-03 PROCEDURE — 36591 DRAW BLOOD OFF VENOUS DEVICE: CPT

## 2018-11-03 PROCEDURE — 84295 ASSAY OF SERUM SODIUM: CPT

## 2018-11-03 PROCEDURE — 80076 HEPATIC FUNCTION PANEL: CPT

## 2018-11-03 PROCEDURE — S0028 INJECTION, FAMOTIDINE, 20 MG: HCPCS | Performed by: NURSE PRACTITIONER

## 2018-11-03 PROCEDURE — 6360000002 HC RX W HCPCS: Performed by: NURSE PRACTITIONER

## 2018-11-03 RX ADMIN — MICAFUNGIN SODIUM 50 MG: 10 INJECTION, POWDER, LYOPHILIZED, FOR SOLUTION INTRAVENOUS at 10:36

## 2018-11-03 RX ADMIN — TBO-FILGRASTIM 300 MCG: 300 INJECTION, SOLUTION SUBCUTANEOUS at 18:43

## 2018-11-03 RX ADMIN — ONDANSETRON 8 MG: 2 INJECTION INTRAMUSCULAR; INTRAVENOUS at 16:01

## 2018-11-03 RX ADMIN — DEXAMETHASONE SODIUM PHOSPHATE 4 MG: 4 INJECTION, SOLUTION INTRAMUSCULAR; INTRAVENOUS at 06:10

## 2018-11-03 RX ADMIN — Medication 20 MG: at 09:14

## 2018-11-03 RX ADMIN — MEROPENEM 2 G: 1 INJECTION, POWDER, FOR SOLUTION INTRAVENOUS at 21:38

## 2018-11-03 RX ADMIN — Medication 10 ML: at 09:14

## 2018-11-03 RX ADMIN — VANCOMYCIN HYDROCHLORIDE 1000 MG: 10 INJECTION, POWDER, LYOPHILIZED, FOR SOLUTION INTRAVENOUS at 15:57

## 2018-11-03 RX ADMIN — MEROPENEM 2 G: 1 INJECTION, POWDER, FOR SOLUTION INTRAVENOUS at 11:48

## 2018-11-03 RX ADMIN — ONDANSETRON 8 MG: 2 INJECTION INTRAMUSCULAR; INTRAVENOUS at 09:14

## 2018-11-03 RX ADMIN — ONDANSETRON 8 MG: 2 INJECTION INTRAMUSCULAR; INTRAVENOUS at 00:45

## 2018-11-03 RX ADMIN — MEROPENEM 2 G: 1 INJECTION, POWDER, FOR SOLUTION INTRAVENOUS at 03:56

## 2018-11-03 RX ADMIN — MAGNESIUM SULFATE HEPTAHYDRATE 2 G: 40 INJECTION, SOLUTION INTRAVENOUS at 06:11

## 2018-11-03 RX ADMIN — DEXAMETHASONE SODIUM PHOSPHATE 4 MG: 4 INJECTION, SOLUTION INTRAMUSCULAR; INTRAVENOUS at 16:01

## 2018-11-03 RX ADMIN — DEXAMETHASONE SODIUM PHOSPHATE 4 MG: 4 INJECTION, SOLUTION INTRAMUSCULAR; INTRAVENOUS at 10:42

## 2018-11-03 RX ADMIN — Medication 20 MG: at 21:38

## 2018-11-03 RX ADMIN — SODIUM BICARBONATE: 84 INJECTION, SOLUTION INTRAVENOUS at 16:07

## 2018-11-03 RX ADMIN — DEXAMETHASONE SODIUM PHOSPHATE 4 MG: 4 INJECTION, SOLUTION INTRAMUSCULAR; INTRAVENOUS at 00:45

## 2018-11-03 ASSESSMENT — PAIN SCALES - PAIN ASSESSMENT IN ADVANCED DEMENTIA (PAINAD)
NEGVOCALIZATION: 0
BREATHING: 0
BODYLANGUAGE: 0
BREATHING: 0
BODYLANGUAGE: 0
NEGVOCALIZATION: 0
TOTALSCORE: 0
CONSOLABILITY: 0
BODYLANGUAGE: 0
BREATHING: 0
NEGVOCALIZATION: 0
TOTALSCORE: 0
FACIALEXPRESSION: 0
CONSOLABILITY: 0
NEGVOCALIZATION: 0
BREATHING: 0
CONSOLABILITY: 0
FACIALEXPRESSION: 0
TOTALSCORE: 0
BODYLANGUAGE: 0
TOTALSCORE: 0
BREATHING: 0
NEGVOCALIZATION: 0
FACIALEXPRESSION: 0
CONSOLABILITY: 0
NEGVOCALIZATION: 0
CONSOLABILITY: 0
FACIALEXPRESSION: 0
BREATHING: 0
TOTALSCORE: 0
CONSOLABILITY: 0
FACIALEXPRESSION: 0
BODYLANGUAGE: 0
BODYLANGUAGE: 0
FACIALEXPRESSION: 0
TOTALSCORE: 0

## 2018-11-03 ASSESSMENT — PAIN SCALES - GENERAL
PAINLEVEL_OUTOF10: 0

## 2018-11-03 ASSESSMENT — PAIN SCALES - WONG BAKER
WONGBAKER_NUMERICALRESPONSE: 0

## 2018-11-03 NOTE — PLAN OF CARE
show no infection signs and symptoms  Will show no infection signs and symptoms   Outcome: Ongoing  Pt afebrile. RCWP in place; site and dressing remain c/d/i. Lines flush well with good blood return; Tegaderm and Biopatch in place. Lines pinned per protocol. Will continue to monitor. CVC site remains free of signs/symptoms of infection. No drainage, edema, erythema, pain, or warmth noted at site. Dressing changes continue per protocol and on an as needed basis - see flowsheet. Compliant with [facility] Bath Protocol:  Performed CHG bath today per [facility] protocol utilizing Bed bath with CHG wipes. CVC site cleansed with CHG wipe over dressing, skin surrounding dressing, and first 6\" of IV tubing. Pt tolerated well. Continued to encourage daily CHG bathing per [facility] protocol. Problem: Nausea/Vomiting:  Goal: Absence of nausea/vomiting  Absence of nausea/vomiting   Outcome: Ongoing  No evidence of nausea/vomiting so far this shift. Problem: Venous Thromboembolism:  Goal: Will show no signs or symptoms of venous thromboembolism  Will show no signs or symptoms of venous thromboembolism   Outcome: Ongoing  No s/s venous thromboembolism. Pt family educated on s/s and instructed to notify RN immediately if s/s noted. Verbalized understanding. Will monitor. Problem: PROTECTIVE PRECAUTIONS  Goal: Patient will remain free of nosocomial Infections  Outcome: Ongoing  Pt compliant with protective precautions, remains in private room. Pt, staff and visitors adhere to handwashing protocol. Pt family verbalizes understanding of low microbial diet.      Electronically signed by Madyson Lopez RN on 11/3/2018 at 3:13 PM

## 2018-11-03 NOTE — PROGRESS NOTES
Mackenzie Pollard MD  Feel free to contact me   [facility] [address]  Office : [phone]  Fax :[phone]

## 2018-11-03 NOTE — PROGRESS NOTES
2300  Blood drawn for sodium level. Family here from [facility] at bedside. 2330  Sodium level 136.  0400  Blood drawn for am labs and vanc trough per protocol and sent to lab.

## 2018-11-04 LAB
ALBUMIN SERPL-MCNC: 3.2 G/DL (ref 3.4–5)
ALP BLD-CCNC: 34 U/L (ref 40–129)
ALT SERPL-CCNC: 148 U/L (ref 10–40)
ANION GAP SERPL CALCULATED.3IONS-SCNC: 12 MMOL/L (ref 3–16)
AST SERPL-CCNC: 23 U/L (ref 15–37)
BILIRUB SERPL-MCNC: 1.3 MG/DL (ref 0–1)
BILIRUBIN DIRECT: 0.3 MG/DL (ref 0–0.3)
BILIRUBIN, INDIRECT: 1 MG/DL (ref 0–1)
BLOOD CULTURE, ROUTINE: NORMAL
BUN BLDV-MCNC: 25 MG/DL (ref 7–20)
CALCIUM SERPL-MCNC: 9.3 MG/DL (ref 8.3–10.6)
CHLORIDE BLD-SCNC: 100 MMOL/L (ref 99–110)
CO2: 21 MMOL/L (ref 21–32)
CREAT SERPL-MCNC: <0.5 MG/DL (ref 0.9–1.3)
CULTURE, BLOOD 2: NORMAL
GFR AFRICAN AMERICAN: >60
GFR NON-AFRICAN AMERICAN: >60
GLUCOSE BLD-MCNC: 98 MG/DL (ref 70–99)
HCT VFR BLD CALC: 23.5 % (ref 40.5–52.5)
HEMOGLOBIN: 8.5 G/DL (ref 13.5–17.5)
MAGNESIUM: 1.8 MG/DL (ref 1.8–2.4)
MCH RBC QN AUTO: 31.5 PG (ref 26–34)
MCHC RBC AUTO-ENTMCNC: 36.3 G/DL (ref 31–36)
MCV RBC AUTO: 86.6 FL (ref 80–100)
PDW BLD-RTO: 15.9 % (ref 12.4–15.4)
PHOSPHORUS: 2.7 MG/DL (ref 2.5–4.9)
PLATELET # BLD: 19 K/UL (ref 135–450)
PMV BLD AUTO: 7.1 FL (ref 5–10.5)
POTASSIUM SERPL-SCNC: 4.4 MMOL/L (ref 3.5–5.1)
RBC # BLD: 2.71 M/UL (ref 4.2–5.9)
SODIUM BLD-SCNC: 131 MMOL/L (ref 136–145)
SODIUM BLD-SCNC: 132 MMOL/L (ref 136–145)
SODIUM BLD-SCNC: 133 MMOL/L (ref 136–145)
SODIUM BLD-SCNC: 133 MMOL/L (ref 136–145)
SODIUM BLD-SCNC: 134 MMOL/L (ref 136–145)
SODIUM BLD-SCNC: 134 MMOL/L (ref 136–145)
TOTAL PROTEIN: 5.5 G/DL (ref 6.4–8.2)
URIC ACID, SERUM: 1.9 MG/DL (ref 3.5–7.2)
WBC # BLD: 0.4 K/UL (ref 4–11)

## 2018-11-04 PROCEDURE — 6360000002 HC RX W HCPCS: Performed by: INTERNAL MEDICINE

## 2018-11-04 PROCEDURE — 84100 ASSAY OF PHOSPHORUS: CPT

## 2018-11-04 PROCEDURE — 36591 DRAW BLOOD OFF VENOUS DEVICE: CPT

## 2018-11-04 PROCEDURE — 80048 BASIC METABOLIC PNL TOTAL CA: CPT

## 2018-11-04 PROCEDURE — 83735 ASSAY OF MAGNESIUM: CPT

## 2018-11-04 PROCEDURE — 2500000003 HC RX 250 WO HCPCS: Performed by: NURSE PRACTITIONER

## 2018-11-04 PROCEDURE — 2580000003 HC RX 258: Performed by: INTERNAL MEDICINE

## 2018-11-04 PROCEDURE — 6360000002 HC RX W HCPCS: Performed by: NURSE PRACTITIONER

## 2018-11-04 PROCEDURE — 85025 COMPLETE CBC W/AUTO DIFF WBC: CPT

## 2018-11-04 PROCEDURE — S0028 INJECTION, FAMOTIDINE, 20 MG: HCPCS | Performed by: NURSE PRACTITIONER

## 2018-11-04 PROCEDURE — 80076 HEPATIC FUNCTION PANEL: CPT

## 2018-11-04 PROCEDURE — 84295 ASSAY OF SERUM SODIUM: CPT

## 2018-11-04 PROCEDURE — 2580000003 HC RX 258: Performed by: NURSE PRACTITIONER

## 2018-11-04 PROCEDURE — 2060000000 HC ICU INTERMEDIATE R&B

## 2018-11-04 PROCEDURE — 84550 ASSAY OF BLOOD/URIC ACID: CPT

## 2018-11-04 RX ADMIN — ONDANSETRON 8 MG: 2 INJECTION INTRAMUSCULAR; INTRAVENOUS at 00:24

## 2018-11-04 RX ADMIN — DEXAMETHASONE SODIUM PHOSPHATE 4 MG: 4 INJECTION, SOLUTION INTRAMUSCULAR; INTRAVENOUS at 10:39

## 2018-11-04 RX ADMIN — MICAFUNGIN SODIUM 50 MG: 10 INJECTION, POWDER, LYOPHILIZED, FOR SOLUTION INTRAVENOUS at 10:20

## 2018-11-04 RX ADMIN — DEXAMETHASONE SODIUM PHOSPHATE 4 MG: 4 INJECTION, SOLUTION INTRAMUSCULAR; INTRAVENOUS at 23:47

## 2018-11-04 RX ADMIN — DEXAMETHASONE SODIUM PHOSPHATE 4 MG: 4 INJECTION, SOLUTION INTRAMUSCULAR; INTRAVENOUS at 04:41

## 2018-11-04 RX ADMIN — MEROPENEM 2 G: 1 INJECTION, POWDER, FOR SOLUTION INTRAVENOUS at 19:40

## 2018-11-04 RX ADMIN — SODIUM BICARBONATE: 84 INJECTION, SOLUTION INTRAVENOUS at 15:57

## 2018-11-04 RX ADMIN — MEROPENEM 2 G: 1 INJECTION, POWDER, FOR SOLUTION INTRAVENOUS at 10:19

## 2018-11-04 RX ADMIN — ONDANSETRON 8 MG: 2 INJECTION INTRAMUSCULAR; INTRAVENOUS at 23:47

## 2018-11-04 RX ADMIN — Medication 10 ML: at 08:42

## 2018-11-04 RX ADMIN — ONDANSETRON 8 MG: 2 INJECTION INTRAMUSCULAR; INTRAVENOUS at 15:57

## 2018-11-04 RX ADMIN — Medication 20 MG: at 22:10

## 2018-11-04 RX ADMIN — DEXAMETHASONE SODIUM PHOSPHATE 4 MG: 4 INJECTION, SOLUTION INTRAMUSCULAR; INTRAVENOUS at 00:24

## 2018-11-04 RX ADMIN — MEROPENEM 2 G: 1 INJECTION, POWDER, FOR SOLUTION INTRAVENOUS at 03:13

## 2018-11-04 RX ADMIN — MAGNESIUM SULFATE HEPTAHYDRATE 2 G: 40 INJECTION, SOLUTION INTRAVENOUS at 04:41

## 2018-11-04 RX ADMIN — VANCOMYCIN HYDROCHLORIDE 1000 MG: 10 INJECTION, POWDER, LYOPHILIZED, FOR SOLUTION INTRAVENOUS at 00:24

## 2018-11-04 RX ADMIN — Medication 20 MG: at 08:42

## 2018-11-04 RX ADMIN — VANCOMYCIN HYDROCHLORIDE 1000 MG: 10 INJECTION, POWDER, LYOPHILIZED, FOR SOLUTION INTRAVENOUS at 08:42

## 2018-11-04 RX ADMIN — DEXAMETHASONE SODIUM PHOSPHATE 4 MG: 4 INJECTION, SOLUTION INTRAMUSCULAR; INTRAVENOUS at 15:57

## 2018-11-04 RX ADMIN — ONDANSETRON 8 MG: 2 INJECTION INTRAMUSCULAR; INTRAVENOUS at 08:42

## 2018-11-04 RX ADMIN — VANCOMYCIN HYDROCHLORIDE 1000 MG: 10 INJECTION, POWDER, LYOPHILIZED, FOR SOLUTION INTRAVENOUS at 23:47

## 2018-11-04 RX ADMIN — SODIUM CHLORIDE 500 ML: 9 INJECTION, SOLUTION INTRAVENOUS at 15:57

## 2018-11-04 RX ADMIN — VANCOMYCIN HYDROCHLORIDE 1000 MG: 10 INJECTION, POWDER, LYOPHILIZED, FOR SOLUTION INTRAVENOUS at 15:57

## 2018-11-04 RX ADMIN — TBO-FILGRASTIM 300 MCG: 300 INJECTION, SOLUTION SUBCUTANEOUS at 17:20

## 2018-11-04 ASSESSMENT — PAIN SCALES - PAIN ASSESSMENT IN ADVANCED DEMENTIA (PAINAD)
FACIALEXPRESSION: 0
FACIALEXPRESSION: 0
TOTALSCORE: 0
BREATHING: 0
FACIALEXPRESSION: 0
TOTALSCORE: 0
BODYLANGUAGE: 0
NEGVOCALIZATION: 0
BREATHING: 0
BODYLANGUAGE: 0
TOTALSCORE: 0
CONSOLABILITY: 0
TOTALSCORE: 0
BODYLANGUAGE: 0
TOTALSCORE: 0
CONSOLABILITY: 0
TOTALSCORE: 0
NEGVOCALIZATION: 0
CONSOLABILITY: 0
CONSOLABILITY: 0
FACIALEXPRESSION: 0
FACIALEXPRESSION: 0
NEGVOCALIZATION: 0
BODYLANGUAGE: 0
NEGVOCALIZATION: 0
CONSOLABILITY: 0
BODYLANGUAGE: 0
BODYLANGUAGE: 0
BREATHING: 0
FACIALEXPRESSION: 0
TOTALSCORE: 0
NEGVOCALIZATION: 0
CONSOLABILITY: 0
FACIALEXPRESSION: 0
FACIALEXPRESSION: 0
CONSOLABILITY: 0
BODYLANGUAGE: 0
BREATHING: 0
BODYLANGUAGE: 0
BREATHING: 0
CONSOLABILITY: 0
NEGVOCALIZATION: 0
BREATHING: 0
CONSOLABILITY: 0
TOTALSCORE: 0
BREATHING: 0
NEGVOCALIZATION: 0
TOTALSCORE: 0
BODYLANGUAGE: 0
FACIALEXPRESSION: 0

## 2018-11-04 ASSESSMENT — PAIN SCALES - WONG BAKER
WONGBAKER_NUMERICALRESPONSE: 0

## 2018-11-04 ASSESSMENT — PAIN SCALES - GENERAL
PAINLEVEL_OUTOF10: 0

## 2018-11-04 NOTE — PROGRESS NOTES
Nephrology Progress Note         stable   Good UO     Past Medical History:   Diagnosis Date    Anxiety     Cancer ([address] 06/2018    Peripheral T - Cell Lymphoma, Stage IVb    GERD (gastroesophageal reflux disease)     Hiatal hernia     History of blood transfusion     HTN (hypertension)        Past Surgical History:   Procedure Laterality Date    APPENDECTOMY      INGUINAL HERNIA REPAIR Right     UPPER GASTROINTESTINAL ENDOSCOPY N/A 8/29/2018    EGD BIOPSY performed by Gerhardt Burlington, MD at [facility] ENDOSCOPY       Family History   Problem Relation Age of Onset    Dementia Father          Current Medications:      vancomycin (VANCOCIN) 1,000 mg in dextrose 5 % 100 mL IVPB (Central Line) Q8H   potassium chloride (KLOR-CON) packet 20 mEq Daily   micafungin (MYCAMINE) 50 mg in sodium chloride 0.9 % 100 mL IVPB Daily   acyclovir (ZOVIRAX) suspension 800 mg BID   ondansetron (ZOFRAN) injection 8 mg Q8H   meropenem (MERREM) 2 g in sodium chloride 0.9 % 100 mL IVPB Q8H   sodium bicarbonate 150 mEq in sodium chloride 0.45 % 1,000 mL infusion Continuous   scopolamine (TRANSDERM-SCOP) transdermal patch 1 patch Q72H   Tbo-Filgrastim (GRANIX) injection 300 mcg QPM   dexamethasone (DECADRON) injection 4 mg Q6H   potassium chloride 20 mEq/50 mL IVPB (Central Line) PRN   magnesium sulfate 2 g in 50 mL IVPB premix PRN   famotidine (PEPCID) injection 20 mg BID   prochlorperazine (COMPAZINE) tablet 10 mg Q4H PRN   Or    prochlorperazine (COMPAZINE) injection 10 mg Q4H PRN   acetaminophen (TYLENOL) tablet 650 mg Q4H PRN   0.9 % sodium chloride infusion Continuous PRN   sodium chloride flush 0.9 % injection 10 mL 2 times per day   sodium chloride flush 0.9 % injection 10 mL PRN   potassium chloride 20 mEq/50 mL IVPB (Central Line) PRN   magnesium sulfate 4 g in 100 mL IVPB premix PRN   magnesium hydroxide (MILK OF MAGNESIA) 400 MG/5ML suspension 30 mL Daily PRN   Saline Mouthwash 15 mL 4x Daily AC & HS   Saline Mouthwash 15 mL Q4H PRN   alteplase (CATHFLO) injection 2 mg PRN           Physical exam:     Vitals:  BP (!) 132/99   Pulse 62   Temp 97.4 °F (36.3 °C) (Axillary)   Resp 19   Ht 6' 1\" (1.854 m)   Wt 159 lb 9.8 oz (72.4 kg) Comment: 2 blankets; personal blanket; 5 pillows; thin sheet   SpO2 94%   BMI 21.06 kg/m²   Constitutional:  Lethargic   Skin: no rash, turgor wnl  Heent:  eomi, mmm  Neck: no bruits or jvd noted  Cardiovascular:  S1, S2 without m/r/g  Respiratory: CTA B without w/r/r  Abdomen:  +bs, soft, nt, nd  Ext: no lower extremity edema  Psychiatric: mood and affect flat  Musculoskeletal:  Rom, muscular strength dec     Data:   Labs:  CBC:   Recent Labs      11/02/18 0430 11/03/18 0400 11/04/18   0320   WBC  0.6*  0.6*  0.4*   HGB  6.7*  9.0*  8.5*   PLT  38*  36*  19*     BMP:    Recent Labs      11/02/18 0430 11/03/18   0400   11/03/18   1600  11/03/18   2145  11/04/18   0320   NA  137  136   < >  138  136   < >  134*  132*  131*  133*  132*   K  4.6   --   4.5   --    --    --   4.4   CL  104   --   104   --    --    --   100   CO2  24   --   20*   --    --    --   21   BUN  19   --   21*   --    --    --   25*   CREATININE  <0.5*   --   <0.5*   --    --    --   <0.5*   GLUCOSE  109*   --   132*   --    --    --   98    < > = values in this interval not displayed. Ca/Mg/Phos:   Recent Labs      11/02/18 0430 11/03/18   0400  11/04/18   0320   CALCIUM  8.8  9.2  9.3   MG  1.80  1.90  1.80   PHOS  2.0*  2.6  2.7     Hepatic:   Recent Labs      11/02/18 0430  11/03/18   0400  11/04/18   0320   AST  48*  26  23   ALT  238*  192*  148*   BILITOT  0.9  1.0  1.3*   ALKPHOS  28*  31*  34*     Troponin: No results for input(s): TROPONINI in the last 72 hours. BNP: No results for input(s): BNP in the last 72 hours.   Lipids: No results for input(s): CHOL, TRIG, HDL, LDLCALC, LABVLDL in the last 72 hours.  ABGs: No results for input(s): PHART, PO2ART, NRB2GIQ in the last 72 hours. INR:   Recent Labs      11/02/18   0430   INR  1.48*     UA:  No results for input(s): Tonie Nicole, GLUCOSEU, BILIRUBINUR, KETUA, SPECGRAV, BLOODU, PHUR, PROTEINU, UROBILINOGEN, NITRU, LEUKOCYTESUR, Elizabeth Solian in the last 72 hours. Urine Microscopic: No results for input(s): LABCAST, BACTERIA, COMU, HYALCAST, WBCUA, RBCUA, EPIU in the last 72 hours. Urine Culture:   No results for input(s): LABURIN in the last 72 hours. Urine Chemistry:   No results for input(s): Marva Chippewa, PROTEINUR, NAUR in the last 72 hours. IMAGING:  XR ABDOMEN (KUB) (SINGLE AP VIEW)   Final Result      Tip of the Corpak feeding tube overlies the distal stomach. XR CHEST PORTABLE   Final Result      Limited imaging of the right lung apex as described. No other acute cardiopulmonary findings. FL INTRO LONG GI TUBE   Final Result   Impression: Technically successful fluoroscopically guided placement of a nasogastric tube with the catheter tip residing in the region of the gastric pylorus. Total fluoroscopy time was 1.5 minutes. Total number of fluoroscopic images is 1. FL LUMBAR PUNCTURE DIAG   Final Result      XR CHEST PORTABLE   Final Result      No acute pulmonary pathology or change from the prior study                  CT Head WO Contrast   Final Result      Abnormal signal in the brainstem and basal ganglia better evaluated on recent MRI. XR CHEST PORTABLE   Final Result      1. No acute disease. FL LUMBAR PUNCTURE DIAG   Final Result   1. Technically successful fluoroscopically guided diagnostic lumbar puncture at the L3-L4 level as described. Opening pressure is measured as 15 cm of water. This is normal.      MRI BRAIN W WO CONTRAST   Final Result      1.  Markedly abnormal MR imaging appearance of the thalamic and brainstem structures, including involvement of the trena, left greater than right midbrain and left greater than right thalamic regions. There is some subtle diffusion restriction suggested in    the left thalamus, and there is also some subtle enhancement, ill-defined, involving the left thalamus, left midbrain and left cerebral peduncle region. Appearance is nonspecific, although the 2 main differential diagnoses would include posterior    reversible encephalopathy syndrome ([place] ES), which can be associated with chemotherapeutic agents, with additional differential diagnostic consideration that of osmotic demyelination. Correlate with any pertinent electrolyte imbalances. Results are telephoned to the nurse caring for the patient, Tionalev Aponte, at 4:08 pm on 10/23/2018. XR CHEST STANDARD (2 VW)   Final Result      Streaky bilateral opacities likely relates to atelectasis and/or pneumonitis. CT ABDOMEN PELVIS W IV CONTRAST Additional Contrast? None   Final Result      No acute intra-abdominal or pelvic pathology. Multiple enlarged mesenteric lymph nodes and an enlarged spleen similar to prior examination. Small to moderate hiatal hernia      High density material in the gallbladder likely sludge and/or small stones. Areas of consolidation in the lower lungs and right middle lobe which are new when compared to prior exam                Assessment/Plan   1. 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 ABn MRI finding - PRES vs PTCL brain involvement     3. Refractory PTCL, Stage IVb w/ BM involvement:     4. Chemo - DHAP  S/p 2 cycles - with cisplatin     5.  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

## 2018-11-04 NOTE — PROGRESS NOTES
w/ hypoPhos. Good uop w/ stable renal fxn stable and weight   - Renal following (Dr. Galo Albarado), appreciate recs; Dr Dk Dawkins covering for Dr. 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   - Replace Potassium and Magnesium per protocol. 5. GI:  Vomiting overnight and diarrhea d/t TF   - Cont IV Pepcid 20 mg bid  - Increase IV Zofran 8 mg q8hrs scheduled  - Cont Scope patch (started 10/31/18)     6. Moderate malnutrition:  Altered MS and unable to swallow  - Dietary to follow   - NPO d/t AMS changes  - Cont TF, increase to aprox 1/2 goal (75ml/hr) and will increase over the weekend if he tolerates this well     7. 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 Concern for PRES or  osmotic demyelination  - LP (10/24/18): pathology consistent w/ lymphoma   - Meningitis Encephalitis Panel: Negative  - CSF studies - Pending,  viral, fungal, atypical bacteria (TB, listeria, whipples, etc), paraneoplastic panel, MARIELA virus   - S/p thiamine replacement (stopped 10/29/18)     He is unresponsive and now DNR. Will consider transfer to hospice in AM if stable    8. Weakness:  Ongoing related to disease and encephalopathy   - Unable to participate in PT/OT d/t AMS    9. Elevated LFTs:  Likely chemotherapy induced, but improving   - Cont to follow daily     - DVT Prophylaxis: Platelets <48,565 cells/dL - prophylactic lovenox on hold and mechanical prophylaxis with bilateral SCDs while in bed in place. Contraindications to pharmacologic prophylaxis: Thrombocytopenia  Contraindications to mechanical prophylaxis: None    - Disposition:  He is deteriorating and overall prognosis is horrible. He is DNR CCA per the wishes of the family.     Marco A Hoskins

## 2018-11-04 NOTE — PROGRESS NOTES
Anderson Joyce MD  Feel free to contact me   [facility] [address]  Office : [phone]  Fax :[phone]

## 2018-11-05 ENCOUNTER — APPOINTMENT (OUTPATIENT)
Dept: GENERAL RADIOLOGY | Age: 58
DRG: 177 | End: 2018-11-05
Payer: COMMERCIAL

## 2018-11-05 LAB
ALBUMIN SERPL-MCNC: 3.7 G/DL (ref 3.4–5)
ALP BLD-CCNC: 39 U/L (ref 40–129)
ALT SERPL-CCNC: 140 U/L (ref 10–40)
AMORPHOUS: ABNORMAL /HPF
ANION GAP SERPL CALCULATED.3IONS-SCNC: 13 MMOL/L (ref 3–16)
APTT: 28.3 SEC (ref 26–36)
AST SERPL-CCNC: 29 U/L (ref 15–37)
BILIRUB SERPL-MCNC: 1.4 MG/DL (ref 0–1)
BILIRUBIN DIRECT: 0.3 MG/DL (ref 0–0.3)
BILIRUBIN URINE: NEGATIVE
BILIRUBIN, INDIRECT: 1.1 MG/DL (ref 0–1)
BLOOD, URINE: ABNORMAL
BUN BLDV-MCNC: 31 MG/DL (ref 7–20)
CALCIUM SERPL-MCNC: 9.3 MG/DL (ref 8.3–10.6)
CHLORIDE BLD-SCNC: 100 MMOL/L (ref 99–110)
CLARITY: ABNORMAL
CO2: 22 MMOL/L (ref 21–32)
COLOR: YELLOW
CREAT SERPL-MCNC: <0.5 MG/DL (ref 0.9–1.3)
GFR AFRICAN AMERICAN: >60
GFR NON-AFRICAN AMERICAN: >60
GLUCOSE BLD-MCNC: 100 MG/DL (ref 70–99)
GLUCOSE URINE: NEGATIVE MG/DL
HCT VFR BLD CALC: 22.9 % (ref 40.5–52.5)
HEMOGLOBIN: 8.2 G/DL (ref 13.5–17.5)
INR BLD: 1.19 (ref 0.86–1.14)
KETONES, URINE: NEGATIVE MG/DL
LACTATE DEHYDROGENASE: 223 U/L (ref 100–190)
LEUKOCYTE ESTERASE, URINE: NEGATIVE
MAGNESIUM: 1.9 MG/DL (ref 1.8–2.4)
MCH RBC QN AUTO: 30.5 PG (ref 26–34)
MCHC RBC AUTO-ENTMCNC: 35.7 G/DL (ref 31–36)
MCV RBC AUTO: 85.3 FL (ref 80–100)
MICROSCOPIC EXAMINATION: YES
NITRITE, URINE: NEGATIVE
PDW BLD-RTO: 15.4 % (ref 12.4–15.4)
PH UA: 8
PHOSPHORUS: 2.9 MG/DL (ref 2.5–4.9)
PLATELET # BLD: 14 K/UL (ref 135–450)
PMV BLD AUTO: 7.7 FL (ref 5–10.5)
POTASSIUM SERPL-SCNC: 4.4 MMOL/L (ref 3.5–5.1)
PROTEIN UA: ABNORMAL MG/DL
PROTHROMBIN TIME: 13.6 SEC (ref 9.8–13)
RBC # BLD: 2.69 M/UL (ref 4.2–5.9)
RBC UA: ABNORMAL /HPF (ref 0–2)
SODIUM BLD-SCNC: 134 MMOL/L (ref 136–145)
SODIUM BLD-SCNC: 135 MMOL/L (ref 136–145)
SPECIFIC GRAVITY UA: 1.01
TOTAL PROTEIN: 5.7 G/DL (ref 6.4–8.2)
URIC ACID, SERUM: 2.3 MG/DL (ref 3.5–7.2)
URINE TYPE: ABNORMAL
UROBILINOGEN, URINE: 0.2 E.U./DL
VANCOMYCIN TROUGH: 21 UG/ML (ref 10–20)
WBC # BLD: 0.3 K/UL (ref 4–11)
WBC UA: ABNORMAL /HPF (ref 0–5)

## 2018-11-05 PROCEDURE — 2580000003 HC RX 258: Performed by: INTERNAL MEDICINE

## 2018-11-05 PROCEDURE — 84550 ASSAY OF BLOOD/URIC ACID: CPT

## 2018-11-05 PROCEDURE — 85730 THROMBOPLASTIN TIME PARTIAL: CPT

## 2018-11-05 PROCEDURE — 71045 X-RAY EXAM CHEST 1 VIEW: CPT

## 2018-11-05 PROCEDURE — 85610 PROTHROMBIN TIME: CPT

## 2018-11-05 PROCEDURE — 2580000003 HC RX 258: Performed by: NURSE PRACTITIONER

## 2018-11-05 PROCEDURE — 2060000000 HC ICU INTERMEDIATE R&B

## 2018-11-05 PROCEDURE — 6360000002 HC RX W HCPCS: Performed by: NURSE PRACTITIONER

## 2018-11-05 PROCEDURE — 80202 ASSAY OF VANCOMYCIN: CPT

## 2018-11-05 PROCEDURE — S0028 INJECTION, FAMOTIDINE, 20 MG: HCPCS | Performed by: NURSE PRACTITIONER

## 2018-11-05 PROCEDURE — 2500000003 HC RX 250 WO HCPCS: Performed by: NURSE PRACTITIONER

## 2018-11-05 PROCEDURE — 6360000002 HC RX W HCPCS: Performed by: INTERNAL MEDICINE

## 2018-11-05 PROCEDURE — 81001 URINALYSIS AUTO W/SCOPE: CPT

## 2018-11-05 PROCEDURE — 80048 BASIC METABOLIC PNL TOTAL CA: CPT

## 2018-11-05 PROCEDURE — 85025 COMPLETE CBC W/AUTO DIFF WBC: CPT

## 2018-11-05 PROCEDURE — 80076 HEPATIC FUNCTION PANEL: CPT

## 2018-11-05 PROCEDURE — 84295 ASSAY OF SERUM SODIUM: CPT

## 2018-11-05 PROCEDURE — 83735 ASSAY OF MAGNESIUM: CPT

## 2018-11-05 PROCEDURE — 36592 COLLECT BLOOD FROM PICC: CPT

## 2018-11-05 PROCEDURE — 84100 ASSAY OF PHOSPHORUS: CPT

## 2018-11-05 PROCEDURE — 83615 LACTATE (LD) (LDH) ENZYME: CPT

## 2018-11-05 RX ORDER — DIMETHICONE, CAMPHOR (SYNTHETIC), MENTHOL, AND PHENOL 1.1; .5; .625; .5 G/100G; G/100G; G/100G; G/100G
OINTMENT TOPICAL PRN
Status: DISCONTINUED | OUTPATIENT
Start: 2018-11-05 | End: 2018-11-08 | Stop reason: HOSPADM

## 2018-11-05 RX ADMIN — ONDANSETRON 8 MG: 2 INJECTION INTRAMUSCULAR; INTRAVENOUS at 09:14

## 2018-11-05 RX ADMIN — ONDANSETRON 8 MG: 2 INJECTION INTRAMUSCULAR; INTRAVENOUS at 16:23

## 2018-11-05 RX ADMIN — MAGNESIUM SULFATE HEPTAHYDRATE 2 G: 40 INJECTION, SOLUTION INTRAVENOUS at 05:10

## 2018-11-05 RX ADMIN — DEXAMETHASONE SODIUM PHOSPHATE 4 MG: 4 INJECTION, SOLUTION INTRAMUSCULAR; INTRAVENOUS at 23:00

## 2018-11-05 RX ADMIN — Medication 10 ML: at 05:11

## 2018-11-05 RX ADMIN — TBO-FILGRASTIM 300 MCG: 300 INJECTION, SOLUTION SUBCUTANEOUS at 18:07

## 2018-11-05 RX ADMIN — Medication 10 ML: at 21:10

## 2018-11-05 RX ADMIN — Medication 20 MG: at 21:10

## 2018-11-05 RX ADMIN — MEROPENEM 2 G: 1 INJECTION, POWDER, FOR SOLUTION INTRAVENOUS at 03:31

## 2018-11-05 RX ADMIN — DEXAMETHASONE SODIUM PHOSPHATE 4 MG: 4 INJECTION, SOLUTION INTRAMUSCULAR; INTRAVENOUS at 05:10

## 2018-11-05 RX ADMIN — SODIUM BICARBONATE: 84 INJECTION, SOLUTION INTRAVENOUS at 16:23

## 2018-11-05 RX ADMIN — Medication 20 MG: at 09:14

## 2018-11-05 RX ADMIN — Medication 10 ML: at 09:15

## 2018-11-05 RX ADMIN — DEXAMETHASONE SODIUM PHOSPHATE 4 MG: 4 INJECTION, SOLUTION INTRAMUSCULAR; INTRAVENOUS at 16:24

## 2018-11-05 RX ADMIN — ONDANSETRON 8 MG: 2 INJECTION INTRAMUSCULAR; INTRAVENOUS at 23:00

## 2018-11-05 RX ADMIN — VANCOMYCIN HYDROCHLORIDE 1000 MG: 10 INJECTION, POWDER, LYOPHILIZED, FOR SOLUTION INTRAVENOUS at 09:15

## 2018-11-05 RX ADMIN — DEXAMETHASONE SODIUM PHOSPHATE 4 MG: 4 INJECTION, SOLUTION INTRAMUSCULAR; INTRAVENOUS at 10:47

## 2018-11-05 RX ADMIN — MEROPENEM 2 G: 1 INJECTION, POWDER, FOR SOLUTION INTRAVENOUS at 12:16

## 2018-11-05 RX ADMIN — MICAFUNGIN SODIUM 50 MG: 10 INJECTION, POWDER, LYOPHILIZED, FOR SOLUTION INTRAVENOUS at 09:14

## 2018-11-05 RX ADMIN — MEROPENEM 2 G: 1 INJECTION, POWDER, FOR SOLUTION INTRAVENOUS at 21:11

## 2018-11-05 ASSESSMENT — PAIN SCALES - PAIN ASSESSMENT IN ADVANCED DEMENTIA (PAINAD)
FACIALEXPRESSION: 0
TOTALSCORE: 0
BODYLANGUAGE: 0
NEGVOCALIZATION: 0
BODYLANGUAGE: 0
BODYLANGUAGE: 0
BREATHING: 0
CONSOLABILITY: 0
TOTALSCORE: 0
BODYLANGUAGE: 0
NEGVOCALIZATION: 0
FACIALEXPRESSION: 0
NEGVOCALIZATION: 0
TOTALSCORE: 0
FACIALEXPRESSION: 0
BREATHING: 0
CONSOLABILITY: 0
BREATHING: 0
FACIALEXPRESSION: 0
CONSOLABILITY: 0
NEGVOCALIZATION: 0
BREATHING: 0
TOTALSCORE: 0
CONSOLABILITY: 0

## 2018-11-05 ASSESSMENT — PAIN SCALES - GENERAL
PAINLEVEL_OUTOF10: 0

## 2018-11-05 ASSESSMENT — PAIN SCALES - WONG BAKER
WONGBAKER_NUMERICALRESPONSE: 0

## 2018-11-05 NOTE — PROGRESS NOTES
1.19*     UA:  Recent Labs      11/05/18   0345   COLORU  Yellow   CLARITYU  CLOUDY*   GLUCOSEU  Negative   BILIRUBINUR  Negative   KETUA  Negative   SPECGRAV  1.015   BLOODU  LARGE*   PHUR  8.0   PROTEINU  TRACE*   UROBILINOGEN  0.2   NITRU  Negative   LEUKOCYTESUR  Negative   LABMICR  YES   URINETYPE  Not Specified      Urine Microscopic:   Recent Labs      11/05/18   0345   WBCUA  None seen   RBCUA  *     Urine Culture:   No results for input(s): LABURIN in the last 72 hours. Urine Chemistry:   No results for input(s): Libby Law, PROTEINUR, NAUR in the last 72 hours. IMAGING:  XR ABDOMEN (KUB) (SINGLE AP VIEW)   Final Result      Tip of the [place]ak feeding tube overlies the distal stomach. XR CHEST PORTABLE   Final Result      Limited imaging of the right lung apex as described. No other acute cardiopulmonary findings. FL INTRO LONG GI TUBE   Final Result   Impression: Technically successful fluoroscopically guided placement of a nasogastric tube with the catheter tip residing in the region of the gastric pylorus. Total fluoroscopy time was 1.5 minutes. Total number of fluoroscopic images is 1. FL LUMBAR PUNCTURE DIAG   Final Result      XR CHEST PORTABLE   Final Result      No acute pulmonary pathology or change from the prior study                  CT Head WO Contrast   Final Result      Abnormal signal in the brainstem and basal ganglia better evaluated on recent MRI. XR CHEST PORTABLE   Final Result      1. No acute disease. FL LUMBAR PUNCTURE DIAG   Final Result   1. Technically successful fluoroscopically guided diagnostic lumbar puncture at the L3-L4 level as described. Opening pressure is measured as 15 cm of water. This is normal.      MRI BRAIN W WO CONTRAST   Final Result      1.  Markedly abnormal MR imaging appearance of the thalamic and brainstem structures, including involvement of the trena, left greater than right

## 2018-11-05 NOTE — PROGRESS NOTES
symptoms   Outcome: Ongoing  Pt afebrile. RCWP in place; site and dressing remain c/d/i. Lines flush well with good blood return; Tegaderm and Biopatch in place. Lines pinned per protocol. Will continue to monitor. CVC site remains free of signs/symptoms of infection. No drainage, edema, erythema, pain, or warmth noted at site. Dressing changes continue per protocol and on an as needed basis - see flowsheet.      Compliant with [facility] Bath Protocol:  Performed CHG bath today per [facility] protocol utilizing Bed bath with CHG wipes. [facility] site cleansed with CHG wipe over dressing, skin surrounding dressing, and first 6\" of IV tubing.  Pt tolerated well.  Continued to encourage daily CHG bathing per [facility] protocol.        Problem: Nausea/Vomiting:  Goal: Absence of nausea/vomiting  Absence of nausea/vomiting   Outcome: Ongoing  No evidence of nausea/vomiting so far this shift.      Problem: Venous Thromboembolism:  Goal: Will show no signs or symptoms of venous thromboembolism  Will show no signs or symptoms of venous thromboembolism   Outcome: Ongoing  No s/s venous thromboembolism. Pt family educated on s/s and instructed to notify RN immediately if s/s noted. Verbalized understanding. Will monitor.        Problem: PROTECTIVE PRECAUTIONS  Goal: Patient will remain free of nosocomial Infections  Outcome: Ongoing  Pt compliant with protective precautions, remains in private room. Pt, staff and visitors adhere to handwashing protocol.      Electronically signed by Seymour Hill RN on 11/5/2018 at 6:51 PM

## 2018-11-05 NOTE — CARE COORDINATION
Family will meet with hospice 11/6 @ 6:00 pm.    KRISTEN Rhodes, [address] [facility] [address]

## 2018-11-05 NOTE — PROGRESS NOTES
the wishes of the family.       KYLIE Woodson - DAKOTA Wolff MD  [facility]  Please contact me through [address]

## 2018-11-05 NOTE — PROGRESS NOTES
Palliative Care Chart Review  and Check in Note:     NAME:  Fredy Puentes  Admit Date: 10/20/2018  Hospital Day:  Hospital Day: 17   Current Code status: DNR-CCA    Palliative care is continuing to following Mr. Christophe Durand for symptom management, discharge planning and goals of care discussion as needed. Patient's chart reviewed today 11/5/18. Pt is sleeping and appears comfortable. His wife and two other family members are at the bedside, seem to be coping well. They are appreciative of care provided by staff and are glad pt has seemed comfortable. Provided emotional support. The following are the currently established goals/code status, and discharge plan to date. Goals of care/Code status: DNR-CCA. Family plans on meeting with hospice. Discharge plan: D/c to [facility] in the next couple of days.     Eliazar Santos NP  [address] [facility]

## 2018-11-06 LAB
ABO/RH: NORMAL
ALBUMIN SERPL-MCNC: 3.7 G/DL (ref 3.4–5)
ALP BLD-CCNC: 46 U/L (ref 40–129)
ALT SERPL-CCNC: 135 U/L (ref 10–40)
ANION GAP SERPL CALCULATED.3IONS-SCNC: 14 MMOL/L (ref 3–16)
ANTIBODY SCREEN: NORMAL
AST SERPL-CCNC: 30 U/L (ref 15–37)
BILIRUB SERPL-MCNC: 1.6 MG/DL (ref 0–1)
BILIRUBIN DIRECT: 0.4 MG/DL (ref 0–0.3)
BILIRUBIN, INDIRECT: 1.2 MG/DL (ref 0–1)
BLOOD BANK DISPENSE STATUS: NORMAL
BLOOD BANK PRODUCT CODE: NORMAL
BPU ID: NORMAL
BUN BLDV-MCNC: 39 MG/DL (ref 7–20)
CALCIUM SERPL-MCNC: 9.5 MG/DL (ref 8.3–10.6)
CHLORIDE BLD-SCNC: 101 MMOL/L (ref 99–110)
CO2: 22 MMOL/L (ref 21–32)
CREAT SERPL-MCNC: 0.6 MG/DL (ref 0.9–1.3)
DESCRIPTION BLOOD BANK: NORMAL
GFR AFRICAN AMERICAN: >60
GFR NON-AFRICAN AMERICAN: >60
GLUCOSE BLD-MCNC: 113 MG/DL (ref 70–99)
HCT VFR BLD CALC: 23 % (ref 40.5–52.5)
HEMOGLOBIN: 8.2 G/DL (ref 13.5–17.5)
MAGNESIUM: 2 MG/DL (ref 1.8–2.4)
MCH RBC QN AUTO: 30.4 PG (ref 26–34)
MCHC RBC AUTO-ENTMCNC: 35.7 G/DL (ref 31–36)
MCV RBC AUTO: 85.1 FL (ref 80–100)
PDW BLD-RTO: 15.2 % (ref 12.4–15.4)
PHOSPHORUS: 3.5 MG/DL (ref 2.5–4.9)
PLATELET # BLD: 8 K/UL (ref 135–450)
PMV BLD AUTO: 8.8 FL (ref 5–10.5)
POTASSIUM SERPL-SCNC: 4.6 MMOL/L (ref 3.5–5.1)
RBC # BLD: 2.7 M/UL (ref 4.2–5.9)
SODIUM BLD-SCNC: 134 MMOL/L (ref 136–145)
SODIUM BLD-SCNC: 135 MMOL/L (ref 136–145)
SODIUM BLD-SCNC: 137 MMOL/L (ref 136–145)
TOTAL PROTEIN: 6.1 G/DL (ref 6.4–8.2)
URIC ACID, SERUM: 2.7 MG/DL (ref 3.5–7.2)
WBC # BLD: 0.2 K/UL (ref 4–11)

## 2018-11-06 PROCEDURE — 36591 DRAW BLOOD OFF VENOUS DEVICE: CPT

## 2018-11-06 PROCEDURE — 2580000003 HC RX 258: Performed by: INTERNAL MEDICINE

## 2018-11-06 PROCEDURE — S0028 INJECTION, FAMOTIDINE, 20 MG: HCPCS | Performed by: NURSE PRACTITIONER

## 2018-11-06 PROCEDURE — 87081 CULTURE SCREEN ONLY: CPT

## 2018-11-06 PROCEDURE — 2500000003 HC RX 250 WO HCPCS: Performed by: NURSE PRACTITIONER

## 2018-11-06 PROCEDURE — 84295 ASSAY OF SERUM SODIUM: CPT

## 2018-11-06 PROCEDURE — 6370000000 HC RX 637 (ALT 250 FOR IP): Performed by: INTERNAL MEDICINE

## 2018-11-06 PROCEDURE — 86850 RBC ANTIBODY SCREEN: CPT

## 2018-11-06 PROCEDURE — 80076 HEPATIC FUNCTION PANEL: CPT

## 2018-11-06 PROCEDURE — 84550 ASSAY OF BLOOD/URIC ACID: CPT

## 2018-11-06 PROCEDURE — 6360000002 HC RX W HCPCS: Performed by: INTERNAL MEDICINE

## 2018-11-06 PROCEDURE — 86901 BLOOD TYPING SEROLOGIC RH(D): CPT

## 2018-11-06 PROCEDURE — 6360000002 HC RX W HCPCS: Performed by: NURSE PRACTITIONER

## 2018-11-06 PROCEDURE — 2060000000 HC ICU INTERMEDIATE R&B

## 2018-11-06 PROCEDURE — 2580000003 HC RX 258: Performed by: NURSE PRACTITIONER

## 2018-11-06 PROCEDURE — 80048 BASIC METABOLIC PNL TOTAL CA: CPT

## 2018-11-06 PROCEDURE — 83735 ASSAY OF MAGNESIUM: CPT

## 2018-11-06 PROCEDURE — 84100 ASSAY OF PHOSPHORUS: CPT

## 2018-11-06 PROCEDURE — 36430 TRANSFUSION BLD/BLD COMPNT: CPT

## 2018-11-06 PROCEDURE — 86900 BLOOD TYPING SEROLOGIC ABO: CPT

## 2018-11-06 PROCEDURE — 85025 COMPLETE CBC W/AUTO DIFF WBC: CPT

## 2018-11-06 PROCEDURE — P9037 PLATE PHERES LEUKOREDU IRRAD: HCPCS

## 2018-11-06 RX ORDER — 0.9 % SODIUM CHLORIDE 0.9 %
250 INTRAVENOUS SOLUTION INTRAVENOUS ONCE
Status: COMPLETED | OUTPATIENT
Start: 2018-11-06 | End: 2018-11-06

## 2018-11-06 RX ADMIN — DEXAMETHASONE SODIUM PHOSPHATE 4 MG: 4 INJECTION, SOLUTION INTRAMUSCULAR; INTRAVENOUS at 04:25

## 2018-11-06 RX ADMIN — SODIUM CHLORIDE 250 ML: 9 INJECTION, SOLUTION INTRAVENOUS at 05:56

## 2018-11-06 RX ADMIN — DEXAMETHASONE SODIUM PHOSPHATE 4 MG: 4 INJECTION, SOLUTION INTRAMUSCULAR; INTRAVENOUS at 18:10

## 2018-11-06 RX ADMIN — Medication 10 ML: at 20:57

## 2018-11-06 RX ADMIN — SODIUM CHLORIDE 500 ML: 9 INJECTION, SOLUTION INTRAVENOUS at 11:06

## 2018-11-06 RX ADMIN — SODIUM BICARBONATE: 84 INJECTION, SOLUTION INTRAVENOUS at 14:58

## 2018-11-06 RX ADMIN — ONDANSETRON 8 MG: 2 INJECTION INTRAMUSCULAR; INTRAVENOUS at 08:35

## 2018-11-06 RX ADMIN — MEROPENEM 2 G: 1 INJECTION, POWDER, FOR SOLUTION INTRAVENOUS at 11:05

## 2018-11-06 RX ADMIN — Medication 10 ML: at 08:35

## 2018-11-06 RX ADMIN — Medication 20 MG: at 20:57

## 2018-11-06 RX ADMIN — MEROPENEM 2 G: 1 INJECTION, POWDER, FOR SOLUTION INTRAVENOUS at 19:22

## 2018-11-06 RX ADMIN — DEXAMETHASONE SODIUM PHOSPHATE 4 MG: 4 INJECTION, SOLUTION INTRAMUSCULAR; INTRAVENOUS at 22:34

## 2018-11-06 RX ADMIN — VANCOMYCIN HYDROCHLORIDE 1250 MG: 10 INJECTION, POWDER, LYOPHILIZED, FOR SOLUTION INTRAVENOUS at 14:57

## 2018-11-06 RX ADMIN — MICAFUNGIN SODIUM 50 MG: 10 INJECTION, POWDER, LYOPHILIZED, FOR SOLUTION INTRAVENOUS at 08:48

## 2018-11-06 RX ADMIN — DEXAMETHASONE SODIUM PHOSPHATE 4 MG: 4 INJECTION, SOLUTION INTRAMUSCULAR; INTRAVENOUS at 11:06

## 2018-11-06 RX ADMIN — MEROPENEM 2 G: 1 INJECTION, POWDER, FOR SOLUTION INTRAVENOUS at 04:25

## 2018-11-06 RX ADMIN — ONDANSETRON 8 MG: 2 INJECTION INTRAMUSCULAR; INTRAVENOUS at 16:09

## 2018-11-06 RX ADMIN — VANCOMYCIN HYDROCHLORIDE 1250 MG: 10 INJECTION, POWDER, LYOPHILIZED, FOR SOLUTION INTRAVENOUS at 03:17

## 2018-11-06 RX ADMIN — TBO-FILGRASTIM 300 MCG: 300 INJECTION, SOLUTION SUBCUTANEOUS at 18:10

## 2018-11-06 RX ADMIN — Medication 20 MG: at 08:35

## 2018-11-06 ASSESSMENT — PAIN SCALES - PAIN ASSESSMENT IN ADVANCED DEMENTIA (PAINAD)
BODYLANGUAGE: 1
NEGVOCALIZATION: 0
BREATHING: 0
FACIALEXPRESSION: 0
NEGVOCALIZATION: 0
FACIALEXPRESSION: 0
TOTALSCORE: 1
BODYLANGUAGE: 0
CONSOLABILITY: 0
FACIALEXPRESSION: 0
BODYLANGUAGE: 1
FACIALEXPRESSION: 0
NEGVOCALIZATION: 0
TOTALSCORE: 1
BODYLANGUAGE: 1
CONSOLABILITY: 0
BREATHING: 0
CONSOLABILITY: 0
BREATHING: 0
BREATHING: 0
NEGVOCALIZATION: 0
TOTALSCORE: 0
TOTALSCORE: 1
CONSOLABILITY: 0

## 2018-11-06 ASSESSMENT — PAIN SCALES - WONG BAKER
WONGBAKER_NUMERICALRESPONSE: 0

## 2018-11-06 ASSESSMENT — PAIN SCALES - GENERAL
PAINLEVEL_OUTOF10: 1
PAINLEVEL_OUTOF10: 0
PAINLEVEL_OUTOF10: 0

## 2018-11-06 NOTE — PROGRESS NOTES
Daphney Reyes, 8/29/18) - Negative, no diagnostic alterations   - CT abd/pelvis (9/21/18): Interval decrease in multiple abdominal lymph nodes, persistent splenomegaly.  - S/p DHAP x 2 cycles, now w/ CNS involvement     - CSF (10/24/18) - consistent w/ NHL  - CSF (10/30/18) - reactive cells, but NOT consistent w/ NHL  - S/p HD MTX / Nisa-C w/ CNS dosing of IV decadron 6mg q6hrs, 4 mg q6hrs (10/29/18)  - IT Nisa-C (10/30/18)  CSF (10/30/18): no malignant cells, but w/ ongoing encephalopathy and unresponsive family has decided to transfer to [facility]    10/30/2018    Appearance, CSF Hazy (A)   Clot Evaluation CSF see below   Glucose, CSF 28 (L)   Protein, CSF 94 (H)   RBC, CSF 13 (A)   WBC,  (H)   Neutrophils, CSF 97 (H)   Lymphs, CSF 1 (L)   Monocytes, CSF 2 (L)   Color, CSF Colorless   Number of Cells,      - Mental status improved over last 24 hours and patient was awake and talking last night. Still w/ very poor prognosis and family aware. Palliative care is following     Cycle #1, Day #12    MTX levels:  11/01/18 - 0.09    2. ID: now Afebrile, etiology of fever unclear but concerning for CSF infection w/ >800 WBC and no evidence of NHL  - started empiric tx for meningitis (11/1/18), but still no improvement and now a DNR   - Pan-cx (10/30/18):  NGTD  - CXR (10/30/18) - Neg  - LP (10/24/18):  Encephalitis panel PCR negative, Gram stain negative, crypto neg  - TB Mantoux test pending and quantiferon gold (10/24/18) - Negative  - Cont Micafungin ppx  - Merrem Day + 7 - increased dose to tx empirically for meningitis (11/1/18)   - IV Vancomycin Day + 6 (due to persistent fever + possible CNS infx)     Abx History:  Ceftriaxone Day + 3  (stopped 10/26/18)  Vancomycin  Day + 3  (stopped 10/26/18)  IV Acyclovir Day + 3 (stopped 10/26/18)    3. Heme: Chemotherapy induced pancytopenia   - Transfuse for Hgb < 7 and Platelets < 10 K.  - Platelet transfusion today   - Cont Granix    4.   Metabolic:  hypoNa

## 2018-11-06 NOTE — PLAN OF CARE
Problem: Falls - Risk of:  Goal: Will remain free from falls  Will remain free from falls    Outcome: Ongoing  Pt remains free of falls; on bedrest d/t non-responsiveness. MARYANA in place, nonskid socks on. Pt makes no attempt at [address] [place] [address]. Bed in lowest position, wheels locked, side rails up 3/4. Family at bedside. Will continue to monitor. Unstable:  Due to pts condition, physical activity not encouraged by nursing at this time. Problem: Risk for Impaired Skin Integrity  Goal: Tissue integrity - skin and mucous membranes  Structural intactness and normal physiological function of skin and  mucous membranes. Outcome: Ongoing  No new s/s of skin breakdown noted. Sacral heart and bilateral heel mepilex placed this shift. Pt repositioned q2-3hr as tolerated. Will continue to monitor. Problem: Confusion - Acute:  Goal: Absence of continued neurological deterioration signs and symptoms  Absence of continued neurological deterioration signs and symptoms   Outcome: Ongoing  Pt remains non-responsive; awakens to family's voices intermittently but closes eyes again within a few seconds. Pt unable to follow commands; plans for possible discharge to hospice tomorrow. Family at bedside. Will continue to monitor. Problem: Injury - Risk of, Physical Injury:  Goal: Will remain free from falls  Will remain free from falls    Outcome: Ongoing  Pt remains free of falls; on bedrest d/t non-responsiveness. MARYANA in place, nonskid socks on. Pt makes no attempt at [address] [place] [address]. Bed in lowest position, wheels locked, side rails up 3/4. Family at bedside. Will continue to monitor. Unstable:  Due to pts condition, physical activity not encouraged by nursing at this time.     Problem: Bleeding:  Goal: Will show no signs and symptoms of excessive bleeding  Will show no signs and symptoms of excessive bleeding   Outcome: Ongoing  Patient's hemoglobin this AM:   Recent Labs      11/06/18   0305   HGB  8.2*     Patient's platelet count this AM: Outcome: Ongoing  Adherent with DVT Prevention: Pt is at risk for DVT d/t decreased mobility and cancer treatment. Pt has orders for SCDs while in bed. Problem: PROTECTIVE PRECAUTIONS  Goal: Patient will remain free of nosocomial Infections  Outcome: Ongoing  Pt remains in neutropenic precautions per floor policy. Visitors and staff noted to be following precautions appropriately. Handwashing in place. Pt in private room. Will continue to monitor. Problem: Urinary Elimination:  Goal: Complications related to the disease process, condition or treatment will be avoided or minimized  Complications related to the disease process, condition or treatment will be avoided or minimized   Outcome: Ongoing  Miramontes remains in place for comfort care; draining well at this time. No s/s of infection noted. Miramontes care completed with [facility] this shift. Will continue to monitor.

## 2018-11-07 LAB
ALBUMIN SERPL-MCNC: 3.7 G/DL (ref 3.4–5)
ALP BLD-CCNC: 43 U/L (ref 40–129)
ALT SERPL-CCNC: 102 U/L (ref 10–40)
ANION GAP SERPL CALCULATED.3IONS-SCNC: 14 MMOL/L (ref 3–16)
AST SERPL-CCNC: 21 U/L (ref 15–37)
BILIRUB SERPL-MCNC: 1.4 MG/DL (ref 0–1)
BILIRUBIN DIRECT: 0.3 MG/DL (ref 0–0.3)
BILIRUBIN, INDIRECT: 1.1 MG/DL (ref 0–1)
BLOOD BANK DISPENSE STATUS: NORMAL
BLOOD BANK PRODUCT CODE: NORMAL
BPU ID: NORMAL
BUN BLDV-MCNC: 34 MG/DL (ref 7–20)
CALCIUM SERPL-MCNC: 9.7 MG/DL (ref 8.3–10.6)
CHLORIDE BLD-SCNC: 101 MMOL/L (ref 99–110)
CO2: 21 MMOL/L (ref 21–32)
CREAT SERPL-MCNC: <0.5 MG/DL (ref 0.9–1.3)
DESCRIPTION BLOOD BANK: NORMAL
GFR AFRICAN AMERICAN: >60
GFR NON-AFRICAN AMERICAN: >60
GLUCOSE BLD-MCNC: 109 MG/DL (ref 70–99)
HCT VFR BLD CALC: 18.3 % (ref 40.5–52.5)
HEMOGLOBIN: 6.7 G/DL (ref 13.5–17.5)
INR BLD: 1.35 (ref 0.86–1.14)
LACTATE DEHYDROGENASE: 180 U/L (ref 100–190)
MAGNESIUM: 1.8 MG/DL (ref 1.8–2.4)
MCH RBC QN AUTO: 31 PG (ref 26–34)
MCHC RBC AUTO-ENTMCNC: 36.9 G/DL (ref 31–36)
MCV RBC AUTO: 84 FL (ref 80–100)
PDW BLD-RTO: 15.2 % (ref 12.4–15.4)
PHOSPHORUS: 2.6 MG/DL (ref 2.5–4.9)
PLATELET # BLD: 42 K/UL (ref 135–450)
PMV BLD AUTO: 7.8 FL (ref 5–10.5)
POTASSIUM SERPL-SCNC: 4.3 MMOL/L (ref 3.5–5.1)
PROTHROMBIN TIME: 15.4 SEC (ref 9.8–13)
RBC # BLD: 2.18 M/UL (ref 4.2–5.9)
SODIUM BLD-SCNC: 135 MMOL/L (ref 136–145)
SODIUM BLD-SCNC: 136 MMOL/L (ref 136–145)
SODIUM BLD-SCNC: 136 MMOL/L (ref 136–145)
TOTAL PROTEIN: 6 G/DL (ref 6.4–8.2)
URIC ACID, SERUM: 2.5 MG/DL (ref 3.5–7.2)
WBC # BLD: 0.2 K/UL (ref 4–11)

## 2018-11-07 PROCEDURE — 80048 BASIC METABOLIC PNL TOTAL CA: CPT

## 2018-11-07 PROCEDURE — 51702 INSERT TEMP BLADDER CATH: CPT

## 2018-11-07 PROCEDURE — 80076 HEPATIC FUNCTION PANEL: CPT

## 2018-11-07 PROCEDURE — 84550 ASSAY OF BLOOD/URIC ACID: CPT

## 2018-11-07 PROCEDURE — 85610 PROTHROMBIN TIME: CPT

## 2018-11-07 PROCEDURE — 85025 COMPLETE CBC W/AUTO DIFF WBC: CPT

## 2018-11-07 PROCEDURE — 84100 ASSAY OF PHOSPHORUS: CPT

## 2018-11-07 PROCEDURE — 2580000003 HC RX 258: Performed by: INTERNAL MEDICINE

## 2018-11-07 PROCEDURE — 2500000003 HC RX 250 WO HCPCS: Performed by: NURSE PRACTITIONER

## 2018-11-07 PROCEDURE — 6360000002 HC RX W HCPCS: Performed by: INTERNAL MEDICINE

## 2018-11-07 PROCEDURE — 6360000002 HC RX W HCPCS: Performed by: NURSE PRACTITIONER

## 2018-11-07 PROCEDURE — 83735 ASSAY OF MAGNESIUM: CPT

## 2018-11-07 PROCEDURE — 2580000003 HC RX 258: Performed by: NURSE PRACTITIONER

## 2018-11-07 PROCEDURE — 36430 TRANSFUSION BLD/BLD COMPNT: CPT

## 2018-11-07 PROCEDURE — 84295 ASSAY OF SERUM SODIUM: CPT

## 2018-11-07 PROCEDURE — 36591 DRAW BLOOD OFF VENOUS DEVICE: CPT

## 2018-11-07 PROCEDURE — 83615 LACTATE (LD) (LDH) ENZYME: CPT

## 2018-11-07 PROCEDURE — S0028 INJECTION, FAMOTIDINE, 20 MG: HCPCS | Performed by: NURSE PRACTITIONER

## 2018-11-07 PROCEDURE — 2060000000 HC ICU INTERMEDIATE R&B

## 2018-11-07 PROCEDURE — P9040 RBC LEUKOREDUCED IRRADIATED: HCPCS

## 2018-11-07 PROCEDURE — 86923 COMPATIBILITY TEST ELECTRIC: CPT

## 2018-11-07 RX ORDER — 0.9 % SODIUM CHLORIDE 0.9 %
250 INTRAVENOUS SOLUTION INTRAVENOUS ONCE
Status: COMPLETED | OUTPATIENT
Start: 2018-11-07 | End: 2018-11-07

## 2018-11-07 RX ORDER — ACETAMINOPHEN 650 MG/1
650 SUPPOSITORY RECTAL EVERY 4 HOURS PRN
Status: DISCONTINUED | OUTPATIENT
Start: 2018-11-07 | End: 2018-11-08 | Stop reason: HOSPADM

## 2018-11-07 RX ADMIN — ONDANSETRON 8 MG: 2 INJECTION INTRAMUSCULAR; INTRAVENOUS at 08:50

## 2018-11-07 RX ADMIN — ONDANSETRON 8 MG: 2 INJECTION INTRAMUSCULAR; INTRAVENOUS at 00:32

## 2018-11-07 RX ADMIN — MAGNESIUM SULFATE HEPTAHYDRATE 2 G: 40 INJECTION, SOLUTION INTRAVENOUS at 08:51

## 2018-11-07 RX ADMIN — VANCOMYCIN HYDROCHLORIDE 1250 MG: 10 INJECTION, POWDER, LYOPHILIZED, FOR SOLUTION INTRAVENOUS at 03:00

## 2018-11-07 RX ADMIN — Medication 10 ML: at 20:57

## 2018-11-07 RX ADMIN — ONDANSETRON 8 MG: 2 INJECTION INTRAMUSCULAR; INTRAVENOUS at 15:58

## 2018-11-07 RX ADMIN — Medication 10 ML: at 08:51

## 2018-11-07 RX ADMIN — SODIUM CHLORIDE 250 ML: 9 INJECTION, SOLUTION INTRAVENOUS at 05:25

## 2018-11-07 RX ADMIN — MEROPENEM 2 G: 1 INJECTION, POWDER, FOR SOLUTION INTRAVENOUS at 03:45

## 2018-11-07 RX ADMIN — MICAFUNGIN SODIUM 50 MG: 10 INJECTION, POWDER, LYOPHILIZED, FOR SOLUTION INTRAVENOUS at 08:50

## 2018-11-07 RX ADMIN — Medication 20 MG: at 08:51

## 2018-11-07 RX ADMIN — DEXAMETHASONE SODIUM PHOSPHATE 4 MG: 4 INJECTION, SOLUTION INTRAMUSCULAR; INTRAVENOUS at 05:11

## 2018-11-07 ASSESSMENT — PAIN SCALES - PAIN ASSESSMENT IN ADVANCED DEMENTIA (PAINAD)
BODYLANGUAGE: 1
BREATHING: 0
TOTALSCORE: 1
TOTALSCORE: 1
CONSOLABILITY: 0
FACIALEXPRESSION: 0
NEGVOCALIZATION: 0
BODYLANGUAGE: 1
NEGVOCALIZATION: 0
BREATHING: 0
FACIALEXPRESSION: 0
CONSOLABILITY: 0

## 2018-11-07 ASSESSMENT — PAIN SCALES - GENERAL
PAINLEVEL_OUTOF10: 1
PAINLEVEL_OUTOF10: 0

## 2018-11-07 ASSESSMENT — PAIN SCALES - WONG BAKER
WONGBAKER_NUMERICALRESPONSE: 0
WONGBAKER_NUMERICALRESPONSE: 0

## 2018-11-07 NOTE — PROGRESS NOTES
11/05/18 0345 11/06/18 0305 11/07/18 0425   WBC  0.3*  0.2*  0.2*   HGB  8.2*  8.2*  6.7*   PLT  14*  8*  42*     BMP:    Recent Labs      11/05/18   0345 11/06/18   0305   11/06/18   2235  11/07/18   0425  11/07/18   0950   NA  135*  135*   < >  135*  137   < >  134*  136  135*  136   K  4.4   --   4.6   --    --   4.3   --    CL  100   --   101   --    --   101   --    CO2  22   --   22   --    --   21   --    BUN  31*   --   39*   --    --   34*   --    CREATININE  <0.5*   --   0.6*   --    --   <0.5*   --    GLUCOSE  100*   --   113*   --    --   109*   --     < > = values in this interval not displayed. Ca/Mg/Phos:   Recent Labs      11/05/18 0345 11/06/18 0305 11/07/18 0425   CALCIUM  9.3  9.5  9.7   MG  1.90  2.00  1.80   PHOS  2.9  3.5  2.6     Hepatic:   Recent Labs      11/05/18 0345 11/06/18 0305 11/07/18 0425   AST  29  30  21   ALT  140*  135*  102*   BILITOT  1.4*  1.6*  1.4*   ALKPHOS  39*  46  43     Troponin: No results for input(s): TROPONINI in the last 72 hours. BNP: No results for input(s): BNP in the last 72 hours. Lipids: No results for input(s): CHOL, TRIG, HDL, LDLCALC, LABVLDL in the last 72 hours. ABGs: No results for input(s): PHART, PO2ART, OCD4NJR in the last 72 hours. INR:   Recent Labs      11/05/18 0345 11/07/18 0425   INR  1.19*  1.35*     UA:  Recent Labs      11/05/18 0345   COLORU  Yellow   CLARITYU  CLOUDY*   GLUCOSEU  Negative   BILIRUBINUR  Negative   KETUA  Negative   SPECGRAV  1.015   BLOODU  LARGE*   PHUR  8.0   PROTEINU  TRACE*   UROBILINOGEN  0.2   NITRU  Negative   LEUKOCYTESUR  Negative   LABMICR  YES   URINETYPE  Not Specified      Urine Microscopic:   Recent Labs      11/05/18   0345   WBCUA  None seen   RBCUA  *     Urine Culture:   No results for input(s): LABURIN in the last 72 hours. Urine Chemistry:   No results for input(s): Libby Law, PROTEINUR, NAUR in the last 72 hours.           IMAGING:  XR ABDOMEN (KUB) (SINGLE AP VIEW)   Final Result      Tip of the Corpak feeding tube overlies the distal stomach. XR CHEST PORTABLE   Final Result      Limited imaging of the right lung apex as described. No other acute cardiopulmonary findings. FL INTRO LONG GI TUBE   Final Result   Impression: Technically successful fluoroscopically guided placement of a nasogastric tube with the catheter tip residing in the region of the gastric pylorus. Total fluoroscopy time was 1.5 minutes. Total number of fluoroscopic images is 1. FL LUMBAR PUNCTURE DIAG   Final Result      XR CHEST PORTABLE   Final Result      No acute pulmonary pathology or change from the prior study                  CT Head WO Contrast   Final Result      Abnormal signal in the brainstem and basal ganglia better evaluated on recent MRI. XR CHEST PORTABLE   Final Result      1. No acute disease. FL LUMBAR PUNCTURE DIAG   Final Result   1. Technically successful fluoroscopically guided diagnostic lumbar puncture at the L3-L4 level as described. Opening pressure is measured as 15 cm of water. This is normal.      MRI BRAIN W WO CONTRAST   Final Result      1. Markedly abnormal MR imaging appearance of the thalamic and brainstem structures, including involvement of the trena, left greater than right midbrain and left greater than right thalamic regions. There is some subtle diffusion restriction suggested in    the left thalamus, and there is also some subtle enhancement, ill-defined, involving the left thalamus, left midbrain and left cerebral peduncle region. Appearance is nonspecific, although the 2 main differential diagnoses would include posterior    reversible encephalopathy syndrome (IN ES), which can be associated with chemotherapeutic agents, with additional differential diagnostic consideration that of osmotic demyelination. Correlate with any pertinent electrolyte imbalances.       Results are telephoned to the nurse caring for the patient, Mukesh Dickson, at 4:08 pm on 10/23/2018. XR CHEST STANDARD (2 VW)   Final Result      Streaky bilateral opacities likely relates to atelectasis and/or pneumonitis. CT ABDOMEN PELVIS W IV CONTRAST Additional Contrast? None   Final Result      No acute intra-abdominal or pelvic pathology. Multiple enlarged mesenteric lymph nodes and an enlarged spleen similar to prior examination. Small to moderate hiatal hernia      High density material in the gallbladder likely sludge and/or small stones. Areas of consolidation in the lower lungs and right middle lobe which are new when compared to prior exam                Assessment/Plan   1. 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 ABn MRI finding - PRES vs PTCL brain involvement     3. Refractory PTCL, Stage IVb w/ BM involvement:     4. Chemo - DHAP  S/p 2 cycles - with cisplatin     5.  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

## 2018-11-07 NOTE — PROGRESS NOTES
[facility] Progress Note    2018     Mari Quiroz    MRN: [id number]    : 1960    Referring MD: Dennis Thompson  [id number] [facility] [place]    SUBJECTIVE: Unresponsive. No apparent discomfort.     ECOG PS:(4) Completely disabled, unable to carry out self-care and confined to bed or chair    Isolation: none     Medications    Scheduled Meds:   sodium chloride  250 mL Intravenous Once    vancomycin (VANCOCIN) central line IVPB  1,250 mg Intravenous Q12H    micafungin (MYCAMINE) IVPB  50 mg Intravenous Daily    ondansetron  8 mg Intravenous Q8H    meropenem  2 g Intravenous Q8H    scopolamine  1 patch Transdermal Q72H    Tbo-Filgrastim  300 mcg Subcutaneous QPM    dexamethasone  4 mg Intravenous Q6H    famotidine (PEPCID) injection  20 mg Intravenous BID    sodium chloride flush  10 mL Intravenous 2 times per day    Saline Mouthwash  15 mL Swish & Spit 4x Daily AC & HS     Continuous Infusions:   sodium bicarbonate infusion 50 mL/hr at 18 1458    sodium chloride 500 mL (18 1106)     PRN Meds:.medicated lip ointment, potassium chloride, magnesium sulfate, prochlorperazine **OR** prochlorperazine, acetaminophen, sodium chloride, sodium chloride flush, potassium chloride, magnesium sulfate, magnesium hydroxide, Saline Mouthwash, alteplase    ROS:  Unable to assess d/t AMS changes    Physical Exam:     I&O:      Intake/Output Summary (Last 24 hours) at 18 0729  Last data filed at 18 0655   Gross per 24 hour   Intake          1943.42 ml   Output             2950 ml   Net         -1006.58 ml       Vital Signs:  BP (!) 122/94   Pulse 84   Temp 97.1 °F (36.2 °C) (Axillary)   Resp 22   Ht 6' 1\" (1.854 m)   Wt 156 lb 12 oz (71.1 kg)   SpO2 98%   BMI 20.68 kg/m²     Weight:    Wt Readings from Last 3 Encounters:   18 156 lb 12 oz (71.1 kg)   18 188 lb 4.8 oz (85.4 kg)   18 174 lb 1.6 oz (79 kg)       General: now awake and

## 2018-11-08 VITALS
RESPIRATION RATE: 26 BRPM | HEART RATE: 87 BPM | SYSTOLIC BLOOD PRESSURE: 137 MMHG | HEIGHT: 73 IN | BODY MASS INDEX: 20.98 KG/M2 | DIASTOLIC BLOOD PRESSURE: 106 MMHG | WEIGHT: 158.29 LBS | TEMPERATURE: 96.9 F | OXYGEN SATURATION: 97 %

## 2018-11-08 LAB
REPORT: NORMAL
VRE CULTURE: NORMAL

## 2018-11-08 PROCEDURE — 6360000002 HC RX W HCPCS: Performed by: NURSE PRACTITIONER

## 2018-11-08 PROCEDURE — 36591 DRAW BLOOD OFF VENOUS DEVICE: CPT

## 2018-11-08 RX ORDER — MORPHINE SULFATE 100 MG/5ML
10 SOLUTION ORAL
Qty: 30 ML | Refills: 0 | Status: SHIPPED | OUTPATIENT
Start: 2018-11-08 | End: 2018-11-23

## 2018-11-08 RX ORDER — SCOLOPAMINE TRANSDERMAL SYSTEM 1 MG/1
1 PATCH, EXTENDED RELEASE TRANSDERMAL
Qty: 5 PATCH | Refills: 0 | Status: CANCELLED | OUTPATIENT
Start: 2018-11-09

## 2018-11-08 RX ADMIN — ONDANSETRON 8 MG: 2 INJECTION INTRAMUSCULAR; INTRAVENOUS at 00:33

## 2018-11-08 RX ADMIN — ONDANSETRON 8 MG: 2 INJECTION INTRAMUSCULAR; INTRAVENOUS at 08:16

## 2018-11-08 ASSESSMENT — PAIN SCALES - GENERAL: PAINLEVEL_OUTOF10: 0

## 2018-11-08 NOTE — CARE COORDINATION
Social Work Discharge Note    SW checked on patient, wife and son prior to discharge to [facility] inpt unit at [facility]. Hospice RN set up transportation. Son had some questions regarding finances which SW answered. SW had MD sign code status form. No further SW intervention needed.     KRISTEN An, [address]

## 2018-11-08 NOTE — DISCHARGE SUMMARY
nausea/vomiting returns      5.  Moderate malnutrition:  Altered MS and unable to swallow  - No longer eating      6. 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 Concern for PRES or osmotic demyelination  - LP (10/24/18): pathology consistent w/ lymphoma   - He is unresponsive and now DNR    Condition on discharge:  Poor, actively dying and being discharged to inpatient Hospice Care     Discharge Instructions:  No follow up indicated. Please call Dr. Alexus Yanes w/ questions/concerns     This discharge summary and plan was discussed and agreed upon with Dr. Cris Zapata.     Doris Lundborg, CNP

## 2018-11-09 LAB
EKG ATRIAL RATE: 76 BPM
EKG DIAGNOSIS: NORMAL
EKG P AXIS: 7 DEGREES
EKG P-R INTERVAL: 144 MS
EKG Q-T INTERVAL: 402 MS
EKG QRS DURATION: 100 MS
EKG QTC CALCULATION (BAZETT): 452 MS
EKG R AXIS: -34 DEGREES
EKG T AXIS: 20 DEGREES
EKG VENTRICULAR RATE: 76 BPM

## 2018-11-09 NOTE — PROGRESS NOTES
Nephrology Progress Note        Doing poorly     Past Medical History:   Diagnosis Date    Anxiety     Cancer ([facility] [address] 06/2018    Peripheral T - Cell Lymphoma, Stage IVb    GERD (gastroesophageal reflux disease)     Hiatal hernia     History of blood transfusion     HTN (hypertension)        Past Surgical History:   Procedure Laterality Date    APPENDECTOMY      INGUINAL HERNIA REPAIR Right     UPPER GASTROINTESTINAL ENDOSCOPY N/A 8/29/2018    EGD BIOPSY performed by Shelby Rossi MD at [facility] ENDOSCOPY       Family History   Problem Relation Age of Onset    Dementia Father          Current Medications:      No current facility-administered medications for this encounter. Physical exam:     Vitals:  BP (!) 137/106   Pulse 87   Temp 96.9 °F (36.1 °C) (Temporal)   Resp 26   Ht 6' 1\" (1.854 m)   Wt 158 lb 4.6 oz (71.8 kg)   SpO2 97%   BMI 20.88 kg/m²   Constitutional:  Lethargic   Skin: no rash, turgor wnl  Heent:  eomi, mmm  Neck: no bruits or jvd noted  Cardiovascular:  S1, S2 without m/r/g  Respiratory: CTA B without w/r/r  Abdomen:  +bs, soft, nt, nd  Ext: no lower extremity edema  Psychiatric: mood and affect flat  Musculoskeletal:  Rom, muscular strength dec     Data:   Labs:  CBC:   Recent Labs      11/06/18 0305 11/07/18 0425   WBC  0.2*  0.2*   HGB  8.2*  6.7*   PLT  8*  42*     BMP:    Recent Labs      11/06/18 0305 11/06/18   2235  11/07/18   0425  11/07/18   0950   NA  135*  137   < >  134*  136  135*  136   K  4.6   --    --   4.3   --    CL  101   --    --   101   --    CO2  22   --    --   21   --    BUN  39*   --    --   34*   --    CREATININE  0.6*   --    --   <0.5*   --    GLUCOSE  113*   --    --   109*   --     < > = values in this interval not displayed.      Ca/Mg/Phos:   Recent Labs      11/06/18 0305 11/07/18 0425   CALCIUM  9.5  9.7   MG  2.00  1.80   PHOS  3.5  2.6 Final Result   1. Technically successful fluoroscopically guided diagnostic lumbar puncture at the L3-L4 level as described. Opening pressure is measured as 15 cm of water. This is normal.      MRI BRAIN W WO CONTRAST   Final Result      1. Markedly abnormal MR imaging appearance of the thalamic and brainstem structures, including involvement of the trena, left greater than right midbrain and left greater than right thalamic regions. There is some subtle diffusion restriction suggested in    the left thalamus, and there is also some subtle enhancement, ill-defined, involving the left thalamus, left midbrain and left cerebral peduncle region. Appearance is nonspecific, although the 2 main differential diagnoses would include posterior    reversible encephalopathy syndrome (IN ES), which can be associated with chemotherapeutic agents, with additional differential diagnostic consideration that of osmotic demyelination. Correlate with any pertinent electrolyte imbalances. Results are telephoned to the nurse caring for the patient, Smiley Lam, at 4:08 pm on 10/23/2018. XR CHEST STANDARD (2 VW)   Final Result      Streaky bilateral opacities likely relates to atelectasis and/or pneumonitis. CT ABDOMEN PELVIS W IV CONTRAST Additional Contrast? None   Final Result      No acute intra-abdominal or pelvic pathology. Multiple enlarged mesenteric lymph nodes and an enlarged spleen similar to prior examination. Small to moderate hiatal hernia      High density material in the gallbladder likely sludge and/or small stones. Areas of consolidation in the lower lungs and right middle lobe which are new when compared to prior exam                Assessment/Plan   1. 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 ABn MRI finding - PRES vs PTCL brain involvement     3. Refractory PTCL, Stage IVb w/ BM involvement:     4. Chemo - DHAP  S/p 2 cycles - with cisplatin     5.  Hyponatremia     Plan   - Ur

## 2018-11-09 NOTE — PROGRESS NOTES
Nephrology Progress Note        Doing poorly     Past Medical History:   Diagnosis Date    Anxiety     Cancer ([facility] [address] 06/2018    Peripheral T - Cell Lymphoma, Stage IVb    GERD (gastroesophageal reflux disease)     Hiatal hernia     History of blood transfusion     HTN (hypertension)        Past Surgical History:   Procedure Laterality Date    APPENDECTOMY      INGUINAL HERNIA REPAIR Right     UPPER GASTROINTESTINAL ENDOSCOPY N/A 8/29/2018    EGD BIOPSY performed by Simeon Lombardi MD at [facility] ENDOSCOPY       Family History   Problem Relation Age of Onset    Dementia Father          Current Medications:      No current facility-administered medications for this encounter. Physical exam:     Vitals:  BP (!) 137/106   Pulse 87   Temp 96.9 °F (36.1 °C) (Temporal)   Resp 26   Ht 6' 1\" (1.854 m)   Wt 158 lb 4.6 oz (71.8 kg)   SpO2 97%   BMI 20.88 kg/m²   Constitutional:  Lethargic   Skin: no rash, turgor wnl  Heent:  eomi, mmm  Neck: no bruits or jvd noted  Cardiovascular:  S1, S2 without m/r/g  Respiratory: CTA B without w/r/r  Abdomen:  +bs, soft, nt, nd  Ext: no lower extremity edema  Psychiatric: mood and affect flat  Musculoskeletal:  Rom, muscular strength dec     Data:   Labs:  CBC:   Recent Labs      11/06/18 0305 11/07/18 0425   WBC  0.2*  0.2*   HGB  8.2*  6.7*   PLT  8*  42*     BMP:    Recent Labs      11/06/18 0305 11/06/18   2235  11/07/18   0425  11/07/18   0950   NA  135*  137   < >  134*  136  135*  136   K  4.6   --    --   4.3   --    CL  101   --    --   101   --    CO2  22   --    --   21   --    BUN  39*   --    --   34*   --    CREATININE  0.6*   --    --   <0.5*   --    GLUCOSE  113*   --    --   109*   --     < > = values in this interval not displayed.      Ca/Mg/Phos:   Recent Labs      11/06/18 0305 11/07/18 0425   CALCIUM  9.5  9.7   MG  2.00  1.80   PHOS  3.5  2.6 Hepatic:   Recent Labs      11/06/18   0305  11/07/18   0425   AST  30  21   ALT  135*  102*   BILITOT  1.6*  1.4*   ALKPHOS  46  43     Troponin: No results for input(s): TROPONINI in the last 72 hours. BNP: No results for input(s): BNP in the last 72 hours. Lipids: No results for input(s): CHOL, TRIG, HDL, LDLCALC, LABVLDL in the last 72 hours. ABGs: No results for input(s): PHART, PO2ART, MKC2YSR in the last 72 hours. INR:   Recent Labs      11/07/18   0425   INR  1.35*     UA:  No results for input(s): COLORU, CLARITYU, GLUCOSEU, BILIRUBINUR, KETUA, SPECGRAV, BLOODU, PHUR, PROTEINU, UROBILINOGEN, NITRU, LEUKOCYTESUR, Miller Holter in the last 72 hours. Urine Microscopic:   No results for input(s): LABCAST, BACTERIA, COMU, HYALCAST, WBCUA, RBCUA, EPIU in the last 72 hours. Urine Culture:   No results for input(s): LABURIN in the last 72 hours. Urine Chemistry:   No results for input(s): Leesa Jose Juan, PROTEINUR, NAUR in the last 72 hours. IMAGING:  XR ABDOMEN (KUB) (SINGLE AP VIEW)   Final Result      Tip of the Corpak feeding tube overlies the distal stomach. XR CHEST PORTABLE   Final Result      Limited imaging of the right lung apex as described. No other acute cardiopulmonary findings. FL INTRO LONG GI TUBE   Final Result   Impression: Technically successful fluoroscopically guided placement of a nasogastric tube with the catheter tip residing in the region of the gastric pylorus. Total fluoroscopy time was 1.5 minutes. Total number of fluoroscopic images is 1. FL LUMBAR PUNCTURE DIAG   Final Result      XR CHEST PORTABLE   Final Result      No acute pulmonary pathology or change from the prior study                  CT Head WO Contrast   Final Result      Abnormal signal in the brainstem and basal ganglia better evaluated on recent MRI. XR CHEST PORTABLE   Final Result      1. No acute disease.             FL LUMBAR PUNCTURE DIAG

## 2018-11-14 LAB
Lab: NORMAL
REPORT: NORMAL
THIS TEST SENT TO: NORMAL

## 2018-11-16 LAB — MISCELLANEOUS LAB TEST ORDER: NORMAL

## 2018-11-26 LAB
FUNGUS (MYCOLOGY) CULTURE: NORMAL
FUNGUS STAIN: NORMAL

## 2018-12-03 LAB
CULTURE, FUNGUS BLOOD: NORMAL
CULTURE, FUNGUS BLOOD: NORMAL
FUNGUS (MYCOLOGY) CULTURE: NORMAL
FUNGUS (MYCOLOGY) CULTURE: NORMAL
FUNGUS STAIN: NORMAL
FUNGUS STAIN: NORMAL

## 2018-12-05 LAB — MISCELLANEOUS LAB TEST ORDER: NORMAL

## 2018-12-11 LAB
AFB CULTURE (MYCOBACTERIA): NORMAL
AFB SMEAR: NORMAL

## 2019-01-08 LAB
Lab: NORMAL
REPORT: NORMAL
THIS TEST SENT TO: NORMAL

## (undated) DEVICE — FORCEPS BX L240CM DIA2.4MM L NDL RAD JAW 4 133340